# Patient Record
Sex: FEMALE | Race: WHITE | NOT HISPANIC OR LATINO | Employment: OTHER | URBAN - METROPOLITAN AREA
[De-identification: names, ages, dates, MRNs, and addresses within clinical notes are randomized per-mention and may not be internally consistent; named-entity substitution may affect disease eponyms.]

---

## 2017-12-07 LAB
LEFT EYE DIABETIC RETINOPATHY: NORMAL
RIGHT EYE DIABETIC RETINOPATHY: NORMAL

## 2018-09-06 LAB
CREAT ?TM UR-SCNC: 132 UMOL/L
EXT MICROALBUMIN URINE RANDOM: 4.1
MICROALBUMIN/CREAT UR: 31.1 MG/G{CREAT}

## 2018-12-17 LAB
LEFT EYE DIABETIC RETINOPATHY: NORMAL
RIGHT EYE DIABETIC RETINOPATHY: NORMAL

## 2019-03-21 RX ORDER — LANSOPRAZOLE 15 MG/1
15 CAPSULE, DELAYED RELEASE ORAL DAILY
COMMUNITY
Start: 2009-07-16 | End: 2021-10-22 | Stop reason: SDUPTHER

## 2019-03-21 RX ORDER — LEVETIRACETAM 750 MG/1
2250 TABLET ORAL 2 TIMES DAILY
COMMUNITY
Start: 2015-02-27 | End: 2020-01-22 | Stop reason: HOSPADM

## 2019-03-21 RX ORDER — ATORVASTATIN CALCIUM 20 MG/1
20 TABLET, FILM COATED ORAL
COMMUNITY
Start: 2019-01-30 | End: 2019-06-27 | Stop reason: SDUPTHER

## 2019-03-21 RX ORDER — FOSINOPRIL SODIUM 10 MG/1
10 TABLET ORAL DAILY
COMMUNITY
Start: 2019-01-30 | End: 2019-06-27 | Stop reason: SDUPTHER

## 2019-03-21 RX ORDER — ESCITALOPRAM OXALATE 20 MG/1
20 TABLET ORAL DAILY
COMMUNITY
Start: 2009-11-05 | End: 2021-10-22 | Stop reason: SDUPTHER

## 2019-03-21 RX ORDER — TRAZODONE HYDROCHLORIDE 100 MG/1
100 TABLET ORAL
COMMUNITY
Start: 2008-02-11 | End: 2021-10-22 | Stop reason: SDUPTHER

## 2019-03-21 RX ORDER — METFORMIN HYDROCHLORIDE 500 MG/1
500 TABLET, EXTENDED RELEASE ORAL DAILY
COMMUNITY
Start: 2019-01-29 | End: 2019-12-23 | Stop reason: SDUPTHER

## 2019-03-21 RX ORDER — VENLAFAXINE HYDROCHLORIDE 75 MG/1
75 CAPSULE, EXTENDED RELEASE ORAL DAILY
COMMUNITY
Start: 2014-11-13 | End: 2021-10-22 | Stop reason: SDUPTHER

## 2019-03-21 RX ORDER — TOPIRAMATE 100 MG/1
100 TABLET, FILM COATED ORAL 2 TIMES DAILY
COMMUNITY
Start: 2014-05-09 | End: 2020-01-22 | Stop reason: HOSPADM

## 2019-03-25 PROBLEM — M54.30 SCIATICA: Status: ACTIVE | Noted: 2019-03-25

## 2019-03-25 PROBLEM — E78.5 HYPERLIPIDEMIA: Status: ACTIVE | Noted: 2019-03-25

## 2019-03-25 PROBLEM — E11.9 DIABETES MELLITUS (HCC): Status: ACTIVE | Noted: 2019-03-25

## 2019-03-25 PROBLEM — G43.909 MIGRAINE: Status: ACTIVE | Noted: 2019-03-25

## 2019-03-25 PROBLEM — K21.9 ESOPHAGEAL REFLUX: Status: ACTIVE | Noted: 2019-03-25

## 2019-03-25 PROBLEM — M35.00 SJOGREN'S SYNDROME (HCC): Status: ACTIVE | Noted: 2019-03-25

## 2019-03-25 PROBLEM — I10 HYPERTENSION: Status: ACTIVE | Noted: 2019-03-25

## 2019-03-26 ENCOUNTER — OFFICE VISIT (OUTPATIENT)
Dept: FAMILY MEDICINE CLINIC | Facility: CLINIC | Age: 69
End: 2019-03-26
Payer: MEDICARE

## 2019-03-26 VITALS
HEIGHT: 64 IN | BODY MASS INDEX: 31.24 KG/M2 | DIASTOLIC BLOOD PRESSURE: 76 MMHG | HEART RATE: 71 BPM | WEIGHT: 183 LBS | OXYGEN SATURATION: 98 % | SYSTOLIC BLOOD PRESSURE: 116 MMHG

## 2019-03-26 DIAGNOSIS — R56.9 CONVULSIONS, UNSPECIFIED CONVULSION TYPE (HCC): ICD-10-CM

## 2019-03-26 DIAGNOSIS — E78.49 OTHER HYPERLIPIDEMIA: ICD-10-CM

## 2019-03-26 DIAGNOSIS — D43.2 GANGLIOGLIOMA OF BRAIN (HCC): ICD-10-CM

## 2019-03-26 DIAGNOSIS — E66.9 OBESITY (BMI 30.0-34.9): ICD-10-CM

## 2019-03-26 DIAGNOSIS — E11.65 TYPE 2 DIABETES MELLITUS WITH HYPERGLYCEMIA, WITHOUT LONG-TERM CURRENT USE OF INSULIN (HCC): Primary | ICD-10-CM

## 2019-03-26 DIAGNOSIS — M35.00 SICCA SYNDROME (HCC): ICD-10-CM

## 2019-03-26 DIAGNOSIS — Z12.39 BREAST CANCER SCREENING: ICD-10-CM

## 2019-03-26 PROBLEM — G40.909 SEIZURE DISORDER (HCC): Status: ACTIVE | Noted: 2019-03-26

## 2019-03-26 PROBLEM — F32.A DEPRESSION: Status: ACTIVE | Noted: 2019-03-26

## 2019-03-26 LAB — SL AMB POCT HEMOGLOBIN AIC: 6 (ref ?–6.5)

## 2019-03-26 PROCEDURE — 99203 OFFICE O/P NEW LOW 30 MIN: CPT | Performed by: NURSE PRACTITIONER

## 2019-03-26 PROCEDURE — 83036 HEMOGLOBIN GLYCOSYLATED A1C: CPT | Performed by: NURSE PRACTITIONER

## 2019-03-26 RX ORDER — MULTIVIT-MIN/IRON FUM/FOLIC AC 7.5 MG-4
1 TABLET ORAL DAILY
COMMUNITY

## 2019-03-26 NOTE — PROGRESS NOTES
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[facility] )    Other orders  -     ASPIRIN 81 PO; Take 81 mg by mouth daily  -     atorvastatin (LIPITOR) 20 mg tablet; Take 20 mg by mouth daily at bedtime  -     estrogen, conjugated,-medroxyprogesterone (PREMPRO) 0 625-5 MG per tablet; Take 1 tablet by mouth daily  -     diclofenac sodium (VOLTAREN) 1 %; Apply 1 application topically Three times daily as needed  -     escitalopram (LEXAPRO) 20 mg tablet; Take 20 mg by mouth daily  -     fosinopril (MONOPRIL) 10 mg tablet; Take 10 mg by mouth daily  -     lansoprazole (PREVACID) 15 mg capsule; Take 15 mg by mouth daily  -     levETIRAcetam (KEPPRA) 750 mg tablet; Take 2,250 mg by mouth 2 (two) times a day  -     metFORMIN (GLUCOPHAGE-XR) 500 mg 24 hr tablet; Take 500 mg by mouth daily  -     topiramate (TOPAMAX) 100 mg tablet; Take 100 mg by mouth 2 (two) times a day  -     traZODone (DESYREL) 100 mg tablet; Take 100 mg by mouth daily at bedtime  -     venlafaxine (EFFEXOR-XR) 75 mg 24 hr capsule; Take 75 mg by mouth daily  -     topiramate (TOPAMAX) 200 MG tablet; Take 200 mg by mouth 2 (two) times a day  -     Multiple Vitamins-Minerals (MULTIVITAMIN WITH MINERALS) tablet; Take 1 tablet by mouth daily     MDM:  POCT HA1C 6 0 today - adequate range with current metformin use  Blood work from 03/15/2019 indicate adequate cholesterol level, Triglycerides slightly elevated at 201  BP within range today  Kidney function also adequate  Subjective:      Patient ID: Glenny Brenner is a 79 y o  female      Here today as a new patient accompanied by her   She is with a hx of T2DM, HLD, HTN, seizures, post brain tumor removal (04/2018), and depression  She is currently followed by neurology for her seizures - is currently being decreased on her Keppra and next Topamax  Repeat MRI for re-eval due to brain tumor resection every 6 months  Has not had any seizures since brain tumor removal     Has been on Metformin for the past two years  Has been taking a Statin for quite some time with no side effects  Currently on Lexapro and Effexor for depression - has been taking them for years  Has a hx of GERD - currently controlled by Prevacid  She does watch her carb intake with her diabetes  Has no formal exercise program, but does try to walk around the yard and is active with her grandchildren  The following portions of the patient's history were reviewed and updated as appropriate: allergies, current medications, past family history, past medical history, past social history, past surgical history and problem list     Review of Systems   Constitutional: Negative  HENT: Negative  Respiratory: Negative  Cardiovascular: Negative  Neurological: Negative  Objective:      /76 (BP Location: Right arm, Patient Position: Sitting, Cuff Size: Standard)   Pulse 71   Ht 5' 4" (1 626 m)   Wt 83 kg (183 lb)   SpO2 98%   BMI 31 41 kg/m²          Physical Exam   Constitutional: She is oriented to person, place, and time  She appears well-developed and well-nourished  HENT:   Head: Normocephalic and atraumatic  Neck: Neck supple  Cardiovascular: Normal rate, regular rhythm and normal heart sounds  Exam reveals no gallop and no friction rub  Pulses are no weak pulses  No murmur heard  Pulses:       Dorsalis pedis pulses are 2+ on the right side, and 2+ on the left side  Pulmonary/Chest: Effort normal and breath sounds normal  No stridor  No respiratory distress  She has no wheezes   She has no rales  Feet:   Right Foot:   Skin Integrity: Negative for ulcer, skin breakdown, erythema, warmth, callus or dry skin  Left Foot:   Skin Integrity: Negative for ulcer, skin breakdown, erythema, warmth, callus or dry skin  Neurological: She is alert and oriented to person, place, and time  Psychiatric: She has a normal mood and affect  Her behavior is normal  Judgment and thought content normal    Vitals reviewed  I have spent 30 minutes with Patient and family today in which greater than 50% of this time was spent in counseling/coordination of care regarding Impressions and education  Patient's shoes and socks removed  Right Foot/Ankle   Right Foot Inspection  Skin Exam: skin normal and skin intact no dry skin, no warmth, no callus, no erythema, no maceration, no abnormal color, no pre-ulcer, no ulcer and no callus                            Sensory       Monofilament testing: intact  Vascular    The right DP pulse is 2+  Left Foot/Ankle  Left Foot Inspection  Skin Exam: skin normal and skin intactno dry skin, no warmth, no erythema, no maceration, normal color, no pre-ulcer, no ulcer and no callus                                         Sensory       Monofilament: intact  Vascular    The left DP pulse is 2+  Assign Risk Category:  No deformity present; No loss of protective sensation; No weak pulses       Risk: 0        BMI Counseling: Body mass index is 31 41 kg/m²  Discussed the patient's BMI with her  The BMI is above average  BMI counseling and education was provided to the patient  Exercise recommendations include moderate aerobic physical activity for 150 minutes/week

## 2019-04-29 ENCOUNTER — HOSPITAL ENCOUNTER (OUTPATIENT)
Dept: MAMMOGRAPHY | Facility: MEDICAL CENTER | Age: 69
Discharge: HOME | End: 2019-04-29
Payer: MEDICARE

## 2019-04-29 VITALS — BODY MASS INDEX: 31.24 KG/M2 | HEIGHT: 64 IN | WEIGHT: 183 LBS

## 2019-04-29 DIAGNOSIS — Z12.39 BREAST CANCER SCREENING: ICD-10-CM

## 2019-04-29 PROCEDURE — 77067 SCR MAMMO BI INCL CAD: CPT

## 2019-04-29 PROCEDURE — 77063 BREAST TOMOSYNTHESIS BI: CPT

## 2019-05-07 ENCOUNTER — HOSPITAL ENCOUNTER (OUTPATIENT)
Dept: ULTRASOUND IMAGING | Facility: CLINIC | Age: 69
Discharge: HOME | End: 2019-05-07
Payer: MEDICARE

## 2019-05-07 ENCOUNTER — HOSPITAL ENCOUNTER (OUTPATIENT)
Dept: MAMMOGRAPHY | Facility: CLINIC | Age: 69
Discharge: HOME | End: 2019-05-07
Payer: MEDICARE

## 2019-05-07 VITALS — HEIGHT: 64 IN | BODY MASS INDEX: 31.24 KG/M2 | WEIGHT: 183 LBS

## 2019-05-07 DIAGNOSIS — R92.8 ABNORMAL MAMMOGRAM: ICD-10-CM

## 2019-05-07 PROCEDURE — 77065 DX MAMMO INCL CAD UNI: CPT

## 2019-05-07 PROCEDURE — G0279 TOMOSYNTHESIS, MAMMO: HCPCS

## 2019-05-07 PROCEDURE — 76642 ULTRASOUND BREAST LIMITED: CPT

## 2019-05-08 ENCOUNTER — TRANSCRIBE ORDERS (OUTPATIENT)
Dept: ADMINISTRATIVE | Facility: HOSPITAL | Age: 69
End: 2019-05-08

## 2019-05-08 DIAGNOSIS — Z09 FOLLOW-UP EXAM, 3-6 MONTHS SINCE PREVIOUS EXAM: ICD-10-CM

## 2019-05-08 DIAGNOSIS — R92.8 ABNORMAL MAMMOGRAM: ICD-10-CM

## 2019-05-09 ENCOUNTER — OFFICE VISIT (OUTPATIENT)
Dept: FAMILY MEDICINE CLINIC | Facility: CLINIC | Age: 69
End: 2019-05-09
Payer: MEDICARE

## 2019-05-09 ENCOUNTER — APPOINTMENT (OUTPATIENT)
Dept: RADIOLOGY | Facility: CLINIC | Age: 69
End: 2019-05-09
Payer: MEDICARE

## 2019-05-09 VITALS
BODY MASS INDEX: 31.45 KG/M2 | HEIGHT: 64 IN | WEIGHT: 184.2 LBS | HEART RATE: 74 BPM | SYSTOLIC BLOOD PRESSURE: 116 MMHG | OXYGEN SATURATION: 98 % | DIASTOLIC BLOOD PRESSURE: 70 MMHG
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
## 2019-05-09 DIAGNOSIS — R21 RASH: Primary | ICD-10-CM

## 2019-05-09 DIAGNOSIS — R07.81 RIB PAIN ON RIGHT SIDE: ICD-10-CM

## 2019-05-09 DIAGNOSIS — L30.9 DERMATITIS: ICD-10-CM

## 2019-05-09 PROCEDURE — 99213 OFFICE O/P EST LOW 20 MIN: CPT | Performed by: NURSE PRACTITIONER

## 2019-05-09 PROCEDURE — 71101 X-RAY EXAM UNILAT RIBS/CHEST: CPT

## 2019-05-09 RX ORDER — MOMETASONE FUROATE 1 MG/G
CREAM TOPICAL DAILY
Qty: 45 G | Refills: 0 | Status: SHIPPED | OUTPATIENT
Start: 2019-05-09 | End: 2020-01-14

## 2019-06-27 ENCOUNTER — OFFICE VISIT (OUTPATIENT)
Dept: FAMILY MEDICINE CLINIC | Facility: CLINIC | Age: 69
End: 2019-06-27
Payer: MEDICARE

## 2019-06-27 VITALS
DIASTOLIC BLOOD PRESSURE: 68 MMHG | OXYGEN SATURATION: 98 % | BODY MASS INDEX: 31.04 KG/M2 | HEART RATE: 71 BPM | WEIGHT: 181.8 LBS | SYSTOLIC BLOOD PRESSURE: 116 MMHG | HEIGHT: 64 IN

## 2019-06-27 DIAGNOSIS — E78.49 OTHER HYPERLIPIDEMIA: ICD-10-CM

## 2019-06-27 DIAGNOSIS — I10 ESSENTIAL HYPERTENSION: ICD-10-CM

## 2019-06-27 DIAGNOSIS — Z00.00 MEDICARE ANNUAL WELLNESS VISIT, SUBSEQUENT: Primary | ICD-10-CM

## 2019-06-27 PROCEDURE — G0439 PPPS, SUBSEQ VISIT: HCPCS | Performed by: NURSE PRACTITIONER

## 2019-06-27 RX ORDER — FOSINOPRIL SODIUM 10 MG/1
10 TABLET ORAL DAILY
Qty: 90 TABLET | Refills: 1 | Status: SHIPPED | OUTPATIENT
Start: 2019-06-27 | End: 2019-11-13 | Stop reason: SDUPTHER

## 2019-06-27 RX ORDER — ATORVASTATIN CALCIUM 20 MG/1
20 TABLET, FILM COATED ORAL
Qty: 90 TABLET | Refills: 1 | Status: SHIPPED | OUTPATIENT
Start: 2019-06-27 | End: 2019-11-13 | Stop reason: SDUPTHER

## 2019-10-04 ENCOUNTER — OFFICE VISIT (OUTPATIENT)
Dept: FAMILY MEDICINE CLINIC | Facility: CLINIC | Age: 69
End: 2019-10-04
Payer: MEDICARE

## 2019-10-04 VITALS
HEART RATE: 73 BPM | HEIGHT: 64 IN | SYSTOLIC BLOOD PRESSURE: 112 MMHG | WEIGHT: 176.37 LBS | DIASTOLIC BLOOD PRESSURE: 76 MMHG | BODY MASS INDEX: 30.11 KG/M2 | OXYGEN SATURATION: 97 %

## 2019-10-04 DIAGNOSIS — E78.49 OTHER HYPERLIPIDEMIA: ICD-10-CM

## 2019-10-04 DIAGNOSIS — Z11.59 ENCOUNTER FOR HEPATITIS C SCREENING TEST FOR LOW RISK PATIENT: Primary | ICD-10-CM

## 2019-10-04 DIAGNOSIS — K21.9 GASTROESOPHAGEAL REFLUX DISEASE WITHOUT ESOPHAGITIS: ICD-10-CM

## 2019-10-04 DIAGNOSIS — Z78.0 MENOPAUSE: ICD-10-CM

## 2019-10-04 DIAGNOSIS — E11.9 TYPE 2 DIABETES MELLITUS WITHOUT COMPLICATION, WITHOUT LONG-TERM CURRENT USE OF INSULIN (HCC): ICD-10-CM

## 2019-10-04 DIAGNOSIS — G43.909 MIGRAINE WITHOUT STATUS MIGRAINOSUS, NOT INTRACTABLE, UNSPECIFIED MIGRAINE TYPE: ICD-10-CM

## 2019-10-04 DIAGNOSIS — Z23 NEEDS FLU SHOT: ICD-10-CM

## 2019-10-04 DIAGNOSIS — I10 ESSENTIAL HYPERTENSION: ICD-10-CM

## 2019-10-04 DIAGNOSIS — G40.909 SEIZURE DISORDER (HCC): ICD-10-CM

## 2019-10-04 DIAGNOSIS — Z12.11 ENCOUNTER FOR SCREENING FECAL OCCULT BLOOD TESTING: ICD-10-CM

## 2019-10-04 DIAGNOSIS — Z12.39 BREAST CANCER SCREENING: ICD-10-CM

## 2019-10-04 DIAGNOSIS — F33.0 MILD EPISODE OF RECURRENT MAJOR DEPRESSIVE DISORDER (HCC): ICD-10-CM

## 2019-10-04 LAB
CREAT UR-MCNC: 149 MG/DL
MICROALBUMIN UR-MCNC: 20 MG/L (ref 0–20)
MICROALBUMIN/CREAT 24H UR: 13 MG/G CREATININE (ref 0–30)
SL AMB POCT HEMOGLOBIN AIC: 6.1 (ref ?–6.5)

## 2019-10-04 PROCEDURE — 99213 OFFICE O/P EST LOW 20 MIN: CPT | Performed by: NURSE PRACTITIONER

## 2019-10-04 PROCEDURE — 82043 UR ALBUMIN QUANTITATIVE: CPT | Performed by: NURSE PRACTITIONER

## 2019-10-04 PROCEDURE — 90662 IIV NO PRSV INCREASED AG IM: CPT | Performed by: NURSE PRACTITIONER

## 2019-10-04 PROCEDURE — 82570 ASSAY OF URINE CREATININE: CPT | Performed by: NURSE PRACTITIONER

## 2019-10-04 PROCEDURE — 83036 HEMOGLOBIN GLYCOSYLATED A1C: CPT | Performed by: NURSE PRACTITIONER

## 2019-10-04 PROCEDURE — G0008 ADMIN INFLUENZA VIRUS VAC: HCPCS | Performed by: NURSE PRACTITIONER

## 2019-10-04 NOTE — PROGRESS NOTES
Assessment/Plan:       Diagnoses and all orders for this visit:    Encounter for hepatitis C screening test for low risk patient  -     Hepatitis C antibody; Future    Needs flu shot  -     influenza vaccine, [id number], high-dose, PF 0 5 mL (FLUZONE HIGH-DOSE)    Breast cancer screening    Menopause  -     Hepatitis C antibody; Future  -     estrogen, conjugated,-medroxyprogesterone (PREMPRO) 0 625-5 MG per tablet; Take 1 tablet by mouth daily    Type 2 diabetes mellitus without complication, without long-term current use of insulin (HCC)  -     POCT hemoglobin A1c  -     Microalbumin / creatinine urine ratio  -     Ambulatory referral to Ophthalmology; Future    Essential hypertension    Migraine without status migrainosus, not intractable, unspecified migraine type  -     Comprehensive metabolic panel; Future    Gastroesophageal reflux disease without esophagitis    Seizure disorder ([facility])    Other hyperlipidemia  -     Lipid panel; Future    Mild episode of recurrent major depressive disorder ([facility] )    Encounter for screening fecal occult blood testing  -     Occult Blood, Fecal Immunochemical; Future    Other orders  -     Cancel: Mammo screening bilateral w 3d & cad; Future      Screening blood work to be drawn before next visit  HA1C of 6 1   Slightly higher than 6 months ago but still within range  Fit Kit provided for colon CA screening  Prempro refill provided today  BP within range  Flu shot provided  Subjective:      Patient ID: Anny Ramirez is a 81 y o  female  Here today for a follow-up  She is with a hx of chronic issues: T2DM, HLD, HTN, GERD, migraine's, depression, and seizures  She is on oral medications for her chronic conditions with good control  Her POCT HA1C today was 6 1  She continues with her metformin  She remains seizure free, and has minimal migraine's  Hx of bran tumor, currently alert and oriented  Mammogram scheduled for 11/2019   Denies any issues, open areas with her feet        The following portions of the patient's history were reviewed and updated as appropriate: allergies, current medications, past family history, past medical history, past social history, past surgical history and problem list     Review of Systems   Constitutional: Negative  Respiratory: Negative  Cardiovascular: Negative  Neurological: Negative  Objective:      /76 (BP Location: Left arm, Patient Position: Sitting, Cuff Size: Standard)   Pulse 73   Ht 5' 4" (1 626 m)   Wt 80 kg (176 lb 5 9 oz)   SpO2 97%   BMI 30 27 kg/m²          Physical Exam   Constitutional: She is oriented to person, place, and time  She appears well-developed and well-nourished  HENT:   Head: Normocephalic and atraumatic  Cardiovascular: Normal rate, regular rhythm and normal heart sounds  Pulmonary/Chest: Effort normal and breath sounds normal  No respiratory distress  Neurological: She is alert and oriented to person, place, and time  Psychiatric: She has a normal mood and affect  Her behavior is normal  Judgment and thought content normal    Vitals reviewed  Patient's shoes and socks were not removed  Assign Risk Category:  No deformity present;  No loss of protective sensation;        Risk: 0

## 2019-10-08 ENCOUNTER — APPOINTMENT (OUTPATIENT)
Dept: LAB | Facility: HOSPITAL | Age: 69
End: 2019-10-08
Payer: MEDICARE

## 2019-10-08 DIAGNOSIS — Z12.11 ENCOUNTER FOR SCREENING FECAL OCCULT BLOOD TESTING: ICD-10-CM

## 2019-10-08 LAB — HEMOCCULT STL QL IA: NEGATIVE

## 2019-10-08 PROCEDURE — G0328 FECAL BLOOD SCRN IMMUNOASSAY: HCPCS

## 2019-10-18 ENCOUNTER — TELEPHONE (OUTPATIENT)
Dept: FAMILY MEDICINE CLINIC | Facility: CLINIC | Age: 69
End: 2019-10-18

## 2019-10-18 NOTE — TELEPHONE ENCOUNTER
called  [employer] mail order called patient to advise the Prempro is not covered and they are trying to reach you for a substitute medication  Per patient Jerilyn Boeck is unable to reach our office  Advised  we will look into this

## 2019-10-18 NOTE — TELEPHONE ENCOUNTER
Spoke to   He is requesting we hold on ordering any of the above  States she has enough to get through the end of the year and he is now looking into possibly switching insurance companies

## 2019-10-18 NOTE — TELEPHONE ENCOUNTER
Ok, I checked other estrogen medications and per our formulary in [address] [place] [address] Rd with their insurance, only the Premarin is covered which is Estrogen only , not a combination of the estrogen/progestin like the Prempro

## 2019-10-24 DIAGNOSIS — Z78.0 MENOPAUSE: ICD-10-CM

## 2019-10-24 NOTE — TELEPHONE ENCOUNTER
Patient's  is going come by for the script  Will you please print out the script for Prempro  He is going to pay out of pocket

## 2019-11-12 ENCOUNTER — HOSPITAL ENCOUNTER (OUTPATIENT)
Dept: MAMMOGRAPHY | Facility: CLINIC | Age: 69
Discharge: HOME | End: 2019-11-12
Payer: MEDICARE

## 2019-11-12 VITALS — HEIGHT: 64 IN | BODY MASS INDEX: 30.05 KG/M2 | WEIGHT: 176 LBS

## 2019-11-12 DIAGNOSIS — Z09 FOLLOW-UP EXAM, 3-6 MONTHS SINCE PREVIOUS EXAM: ICD-10-CM

## 2019-11-12 DIAGNOSIS — R92.8 ABNORMAL MAMMOGRAM: ICD-10-CM

## 2019-11-12 PROCEDURE — G0279 TOMOSYNTHESIS, MAMMO: HCPCS

## 2019-11-12 PROCEDURE — 77065 DX MAMMO INCL CAD UNI: CPT

## 2019-11-13 DIAGNOSIS — E78.49 OTHER HYPERLIPIDEMIA: ICD-10-CM

## 2019-11-13 DIAGNOSIS — I10 ESSENTIAL HYPERTENSION: ICD-10-CM

## 2019-11-14 RX ORDER — ATORVASTATIN CALCIUM 20 MG/1
TABLET, FILM COATED ORAL
Qty: 90 TABLET | Refills: 0 | Status: SHIPPED | OUTPATIENT
Start: 2019-11-14 | End: 2019-12-14 | Stop reason: SDUPTHER

## 2019-11-14 RX ORDER — FOSINOPRIL SODIUM 10 MG/1
TABLET ORAL
Qty: 90 TABLET | Refills: 0 | Status: SHIPPED | OUTPATIENT
Start: 2019-11-14 | End: 2019-12-14 | Stop reason: SDUPTHER

## 2019-12-14 DIAGNOSIS — E78.49 OTHER HYPERLIPIDEMIA: ICD-10-CM

## 2019-12-14 DIAGNOSIS — I10 ESSENTIAL HYPERTENSION: ICD-10-CM

## 2019-12-16 ENCOUNTER — OFFICE VISIT (OUTPATIENT)
Dept: FAMILY MEDICINE CLINIC | Facility: CLINIC | Age: 69
End: 2019-12-16
Payer: MEDICARE

## 2019-12-16 ENCOUNTER — TRANSCRIBE ORDERS (OUTPATIENT)
Dept: ADMINISTRATIVE | Facility: HOSPITAL | Age: 69
End: 2019-12-16

## 2019-12-16 ENCOUNTER — APPOINTMENT (OUTPATIENT)
Dept: RADIOLOGY | Facility: CLINIC | Age: 69
End: 2019-12-16
Payer: MEDICARE

## 2019-12-16 VITALS
DIASTOLIC BLOOD PRESSURE: 66 MMHG | HEART RATE: 70 BPM | SYSTOLIC BLOOD PRESSURE: 112 MMHG | OXYGEN SATURATION: 99 % | BODY MASS INDEX: 30.49 KG/M2 | HEIGHT: 64 IN | WEIGHT: 178.57 LBS

## 2019-12-16 DIAGNOSIS — R05.9 COUGH: Primary | ICD-10-CM

## 2019-12-16 DIAGNOSIS — M79.622 PAIN IN LEFT UPPER ARM: ICD-10-CM

## 2019-12-16 DIAGNOSIS — I10 ESSENTIAL HYPERTENSION: ICD-10-CM

## 2019-12-16 PROCEDURE — 99213 OFFICE O/P EST LOW 20 MIN: CPT | Performed by: NURSE PRACTITIONER

## 2019-12-16 PROCEDURE — 73060 X-RAY EXAM OF HUMERUS: CPT

## 2019-12-16 RX ORDER — ATORVASTATIN CALCIUM 20 MG/1
TABLET, FILM COATED ORAL
Qty: 90 TABLET | Refills: 0 | Status: SHIPPED | OUTPATIENT
Start: 2019-12-16 | End: 2020-07-15 | Stop reason: SDUPTHER

## 2019-12-16 RX ORDER — FOSINOPRIL SODIUM 10 MG/1
TABLET ORAL
Qty: 90 TABLET | Refills: 0 | Status: SHIPPED | OUTPATIENT
Start: 2019-12-16 | End: 2019-12-16

## 2019-12-16 RX ORDER — AMLODIPINE BESYLATE 2.5 MG/1
5 TABLET ORAL DAILY
Qty: 180 TABLET | Refills: 0 | Status: SHIPPED | OUTPATIENT
Start: 2019-12-16 | End: 2020-03-05 | Stop reason: SDUPTHER

## 2019-12-16 NOTE — PROGRESS NOTES
Assessment/Plan:       Diagnoses and all orders for this visit:    Cough  -     amLODIPine (NORVASC) 2 5 mg tablet; Take 2 tablets (5 mg total) by mouth daily    Essential hypertension  -     amLODIPine (NORVASC) 2 5 mg tablet; Take 2 tablets (5 mg total) by mouth daily    Pain in left upper arm  -     amLODIPine (NORVASC) 2 5 mg tablet; Take 2 tablets (5 mg total) by mouth daily  -     XR humerus left; Future      Suspect bone/muscle bruise to left upper arm in regards to her recent arm injury  Will have the arm xray'd first - explained to her the potential treatment options - ortho vs PT  Per the cough, as she was told in the past it was due to her ace inhibitor and she has remained on it, will d/c that and will have her start Amlodipine  Will have her return in 4 weeks for a re-evaluation of the BP and the cough  If the cough persists will have to explore a pulmonary/GI route for the source of the cough  Subjective:      Patient ID: Sophie Suarez is a 81 y o  female  Here today for a same day visit accompanied by her  with complaint of left upper arm pain and an ongoing cough  Reports she hit the refrigerator door into her left upper arm a few weeks ago causing pain   Notes of pain to the left outer arm between the Deltoid and triceps/biceps muscle when abducting her arm up to a 90 degree angle  Denies any bruising at time of injury, no bruising present now  Also with an ongoing dry cough that has been going on for several years but has worsened more recently - reports she was told by her last PCP that it was due to her BP medication - she was kept on the BP medication by them   She reports at times when she coughs she feels as though "there is something in my throat "        The following portions of the patient's history were reviewed and updated as appropriate: allergies, current medications, past family history, past medical history, past social history, past surgical history and problem list     Review of Systems   Constitutional: Negative  Respiratory: Positive for cough  Musculoskeletal:        Please see HPI  All other systems reviewed and are negative  Objective:      /66 (BP Location: Left arm, Patient Position: Sitting, Cuff Size: Standard)   Pulse 70   Ht 5' 4" (1 626 m)   Wt 81 kg (178 lb 9 2 oz)   SpO2 99%   BMI 30 65 kg/m²          Physical Exam   Constitutional: She is oriented to person, place, and time  She appears well-developed and well-nourished  HENT:   Head: Normocephalic and atraumatic  Cardiovascular: Normal rate, regular rhythm and normal heart sounds  Exam reveals no friction rub  No murmur heard  Pulses:       Radial pulses are 2+ on the left side  Pulmonary/Chest: Effort normal and breath sounds normal  No stridor  No respiratory distress  She has no wheezes  Musculoskeletal:        Left upper arm: She exhibits tenderness  She exhibits no swelling, no edema, no deformity and no laceration  Arms:  Left arm strength equal and strong when compared to right arm strength  Neurological: She is alert and oriented to person, place, and time

## 2019-12-16 NOTE — PATIENT INSTRUCTIONS
You may receive a survey in the mail, or by e-mail, please fill it out, and let us know how we did! Please remember to sign up for your [employer] debby to check you lab results, send us messages, and schedule appointments  If you have questions about the [employer] option, please call us! Thank you again for choosing [facility]!

## 2019-12-19 LAB
LEFT EYE DIABETIC RETINOPATHY: NORMAL
RIGHT EYE DIABETIC RETINOPATHY: NORMAL

## 2019-12-23 DIAGNOSIS — M79.622 PAIN IN LEFT UPPER ARM: Primary | ICD-10-CM

## 2019-12-23 DIAGNOSIS — E11.9 TYPE 2 DIABETES MELLITUS WITHOUT COMPLICATION, WITHOUT LONG-TERM CURRENT USE OF INSULIN (HCC): Primary | ICD-10-CM

## 2019-12-23 RX ORDER — METFORMIN HYDROCHLORIDE 500 MG/1
500 TABLET, EXTENDED RELEASE ORAL
Qty: 90 TABLET | Refills: 1 | Status: SHIPPED | OUTPATIENT
Start: 2019-12-23 | End: 2020-05-18 | Stop reason: SDUPTHER

## 2019-12-26 ENCOUNTER — EVALUATION (OUTPATIENT)
Dept: PHYSICAL THERAPY | Facility: CLINIC | Age: 69
End: 2019-12-26
Payer: MEDICARE

## 2019-12-26 DIAGNOSIS — M79.622 PAIN IN LEFT UPPER ARM: Primary | ICD-10-CM

## 2019-12-26 PROCEDURE — 97162 PT EVAL MOD COMPLEX 30 MIN: CPT | Performed by: PHYSICAL THERAPIST

## 2019-12-26 PROCEDURE — 97110 THERAPEUTIC EXERCISES: CPT | Performed by: PHYSICAL THERAPIST

## 2019-12-26 NOTE — PROGRESS NOTES
PT Evaluation     Today's date: 2019  Patient name: Susan Marte  : 1950  MRN: [id number]  Referring provider: Noam Glasgow, *  Dx:   Encounter Diagnosis     ICD-10-CM    1  Pain in left upper arm M79 622 Ambulatory referral to Physical Therapy                  Assessment  Assessment details: Susan Marte is a pleasant 81 y o  female presenting to PT with cc of L upper arm and neck stiffness for past 4 weeks  Pt  States pain began when hitting fridge shelf into upper lateral arm  Upon examination, patient was found to have objective deficits as listed below and is displaying capsular pattern of restriction consistent with frozen shoulder  Pt  Is experiencing subsequent functional deficits including inability to grab item overhead, difficulty washing hair and dressing  Pt  Was educated on role of PT to address issues and given initial HEP  Pt  Would benefit from skilled physical therapy to promote improved function and maximize activity tolerance     Understanding of Dx/Px/POC: good   Prognosis: good    Goals      Short Term Goals:  - Decrease pain by 50% in 3 weeks  - Increase L ER  AROM by 50% in 4 weeks  - Increase L Flexion AROM by 50% in 4 weeks    Long Term Goals:  - Independent with comprehensive home exercise program at discharge  - Increase Functional Status Measure to: 80  - Increase strength equal to contralateral side at discharge  - Increase ROM to [facility] at discharge    Plan  Planned modality interventions: cryotherapy, high voltage pulsed current: pain management, high voltage pulsed current: spasm management and unattended electrical stimulation  Planned therapy interventions: abdominal trunk stabilization, body mechanics training, neuromuscular re-education, motor coordination training, joint mobilization, manual therapy, patient education, postural training, therapeutic exercise, therapeutic activities, therapeutic training, strengthening and stretching  Frequency: 3x week  Duration in weeks: 6  Plan of Care beginning date: 2019  Plan of Care expiration date: 2020  Treatment plan discussed with: patient and family        Subjective Evaluation    History of Present Illness  Mechanism of injury: Pt  Presents to PT with cc of acute L upper arm pain with shoulder and neck stiffness for past 4 weeks  She notes pulling open refrigerator door with L arm and forcefully struck shelf off of lateral upper arm  She notes paresthesias following and has had worsening stiffness and pain ever since  She hopes pain would reduce on its own bud hadnt so she visited [facility]  She does have hx of RA and DMII  Denies any past issues in Shoulder/arm  Pain  Current pain rating: 3  At best pain ratin  At worst pain ratin  Location: Lateral upper L arm, superior shoulder, neck  Quality: tight, discomfort and pulling  Relieving factors: relaxation, rest and heat  Aggravating factors: lifting and overhead activity  Progression: worsening    Social Support  Lives with: spouse    Employment status: not working  Hand dominance: right      Diagnostic Tests  X-ray: normal  Patient Goals  Patient goals for therapy: decreased pain, increased motion, return to sport/leisure activities, independence with ADLs/IADLs and increased strength          Objective     Postural Observations  Seated posture: fair  Standing posture: fair    Additional Postural Observation Details  Forward head, rounded shoulders, protracted shoulders, guarded L arm    Palpation     Additional Palpation Details  Pt  TTP along lateral L upper arm  Particularly at lateral, superior biceps   TTP at upper trap, lev scap    Cervical/Thoracic Screen   Cervical range of motion within normal limits with the following exceptions: Reduced B Side bending 50%  Thoracic range of motion within normal limits with the following exceptions: Extension reduced 25%    Neurological Testing     Sensation     Shoulder   Left Shoulder Intact: light touch    Right Shoulder   Intact: light touch    Reflexes   Left   Biceps (C5/C6): trace (1+)    Right   Biceps (C5/C6): trace (1+)    Active Range of Motion   Left Shoulder   Flexion: 125 degrees   Extension: 32 degrees   Abduction: 92 degrees   External rotation 0°: 20 degrees   External rotation 45°: 25 degrees   Internal rotation 0°: 80 degrees     Right Shoulder   Normal active range of motion    Passive Range of Motion   Left Shoulder   Flexion: 130 degrees   Abduction: 115 degrees   External rotation 0°: 22 degrees   External rotation 45°: 26 degrees     Joint Play   Left Shoulder  Hypomobile in the posterior capsule and inferior capsule  Strength/Myotome Testing     Additional Strength Details  L Shoulder Grossly 4+/5 in available range    Tests     Left Shoulder   Negative apprehension and Yergason's                Precautions: DMII      Manual  12/26            PROM -            GH Miles Gr (2-3) -                                                       Exercise Diary  12/26            Pulleys 3'            Table Slides Flexion 10x10"            Pendulums -            Wall Ladder -            Supine Wand Flexions 20x            Supine Wand ER 20x            Doorway Stretch -            Bent over rows -                                                                                                                                                                            Modalities  12/26            MHP pre PT -            CP post PT prn -

## 2019-12-30 ENCOUNTER — OFFICE VISIT (OUTPATIENT)
Dept: PHYSICAL THERAPY | Facility: CLINIC | Age: 69
End: 2019-12-30
Payer: MEDICARE

## 2019-12-30 DIAGNOSIS — M79.622 PAIN IN LEFT UPPER ARM: Primary | ICD-10-CM

## 2019-12-30 PROCEDURE — 97110 THERAPEUTIC EXERCISES: CPT

## 2019-12-30 PROCEDURE — 97140 MANUAL THERAPY 1/> REGIONS: CPT

## 2019-12-30 NOTE — PROGRESS NOTES
Daily Note     Today's date: 2019  Patient name: Sonya Nixon  : 1950  MRN: [id number]  Referring provider: Deepika Sales, *  Dx:   Encounter Diagnosis     ICD-10-CM    1  Pain in left upper arm M79 622                   Subjective: Patient reports shoulder feeling a little looser and less painful  Objective: See treatment diary below      Assessment: Sonya Nixon appears to have made some progress towards goals  Patient pain appears to be greatest during ER stretching  She is most limited ER  she required moderate verbal cueing to complete exercises appropriate form and intensity  Patient ROM should improve with continued treatments  Plan: Continue per plan of care        Precautions: DMII      Manual             PROM - SJ           GH Mobz Gr (2-3) - LY                                                      Exercise Diary             Pulleys 3' 5'           Table Slides Flexion 10x10" Flexion 10x10"           Pendulums - x30 ea           Wall Ladder - 5" x10           Supine Wand Flexions 20x 20x           Supine Wand ER 20x 20x           Doorway Stretch - nv           Bent over rows - 2x10                                                                                                                                                                           Modalities             MHP pre PT -            CP post PT prn - def

## 2020-01-02 ENCOUNTER — OFFICE VISIT (OUTPATIENT)
Dept: PHYSICAL THERAPY | Facility: CLINIC | Age: 70
End: 2020-01-02
Payer: MEDICARE

## 2020-01-02 DIAGNOSIS — M79.622 PAIN IN LEFT UPPER ARM: Primary | ICD-10-CM

## 2020-01-02 PROCEDURE — 97110 THERAPEUTIC EXERCISES: CPT

## 2020-01-02 PROCEDURE — 97140 MANUAL THERAPY 1/> REGIONS: CPT

## 2020-01-02 NOTE — PROGRESS NOTES
Daily Note     Today's date: 2020  Patient name: Kiki Woodward  : 1950  MRN: [id number]  Referring provider: Reyes Zayas, *  Dx:   Encounter Diagnosis     ICD-10-CM    1  Pain in left upper arm M79 622                   Subjective: Patient reports shoulder felt sore after last visit but felt good next day  Objective: See treatment diary below      Assessment: Kiki Woodward continues with good  progress towards goals  Patient pain appears to be decreasing but limits remain in ROM particularly in ER  she required moderate verbal cueing to complete exercises appropriate form and intensity  Patient pain should resolve with continued treatments  Plan: Continue per plan of care        Precautions: DMII      Manual            PROM - SJ SJ          GH Mobz Gr (2-3) - LY LY                                                     Exercise Diary            Pulleys 3' 5' 5'          Table Slides Flexion 10x10" Flexion 10x10" 10x10"  ea          Pendulums - x30 ea x30ea          Wall Ladder - 5" x10 5"x10          Supine Wand Flexions 20x 20x 20x          Supine Wand ER 20x 20x 20x          Doorway Stretch - nv Low  30"x4          Bent over rows - 2x10 3x10                                                                                                                                                                          Modalities            MHP pre PT -            CP post PT prn - def

## 2020-01-06 ENCOUNTER — OFFICE VISIT (OUTPATIENT)
Dept: PHYSICAL THERAPY | Facility: CLINIC | Age: 70
End: 2020-01-06
Payer: MEDICARE

## 2020-01-06 DIAGNOSIS — M79.622 PAIN IN LEFT UPPER ARM: Primary | ICD-10-CM

## 2020-01-06 PROCEDURE — 97140 MANUAL THERAPY 1/> REGIONS: CPT | Performed by: PHYSICAL THERAPIST

## 2020-01-06 PROCEDURE — 97110 THERAPEUTIC EXERCISES: CPT | Performed by: PHYSICAL THERAPIST

## 2020-01-06 NOTE — PROGRESS NOTES
Daily Note     Today's date: 2020  Patient name: Kirill Pa  : 1950  MRN: [id number]  Referring provider: Marla Oneill, *  Dx:   Encounter Diagnosis     ICD-10-CM    1  Pain in left upper arm M79 622                   Subjective: Pt reports it feels like her shoulder is continuing to improve  Objective: See treatment diary below      Assessment: Kimberly Dex tolerated session well  Demonstrates restriction in abduction and ER PROM with tightness reported at end range  Occasional cues needed to perform exercises properly  Reported minor increase in soreness in  lateral shoulder by end of visit  Would benefit from continued PT  Plan: Continue per plan of care        Precautions: DMII      Manual           PROM - SJ SJ DD         GH Mobz Gr (2-3) - LY LY DD                                                    Exercise Diary           Pulleys 3' 5' 5' 5'         Table Slides Flexion 10x10" Flexion 10x10" 10x10"  ea Flex and abd 20x ea         Pendulums - x30 ea x30ea x30 ea         Wall Ladder - 5" x10 5"x10 5"x20         Supine Wand Flexions 20x 20x 20x 20x         Supine Wand ER 20x 20x 20x 20x         Doorway Stretch - nv Low  30"x4 Low  30"x4         Bent over rows - 2x10 3x10 3x10                                                                                                                                                                         Modalities            MHP pre PT -            CP post PT prn - def

## 2020-01-08 ENCOUNTER — OFFICE VISIT (OUTPATIENT)
Dept: PHYSICAL THERAPY | Facility: CLINIC | Age: 70
End: 2020-01-08
Payer: MEDICARE

## 2020-01-08 ENCOUNTER — APPOINTMENT (OUTPATIENT)
Dept: LAB | Facility: CLINIC | Age: 70
End: 2020-01-08
Payer: MEDICARE

## 2020-01-08 DIAGNOSIS — Z78.0 MENOPAUSE: ICD-10-CM

## 2020-01-08 DIAGNOSIS — M79.622 PAIN IN LEFT UPPER ARM: Primary | ICD-10-CM

## 2020-01-08 DIAGNOSIS — Z11.59 ENCOUNTER FOR HEPATITIS C SCREENING TEST FOR LOW RISK PATIENT: ICD-10-CM

## 2020-01-08 DIAGNOSIS — G43.909 MIGRAINE WITHOUT STATUS MIGRAINOSUS, NOT INTRACTABLE, UNSPECIFIED MIGRAINE TYPE: ICD-10-CM

## 2020-01-08 DIAGNOSIS — E78.49 OTHER HYPERLIPIDEMIA: ICD-10-CM

## 2020-01-08 LAB
ALBUMIN SERPL BCP-MCNC: 3.6 G/DL (ref 3.5–5)
ALP SERPL-CCNC: 79 U/L (ref 46–116)
ALT SERPL W P-5'-P-CCNC: 19 U/L (ref 12–78)
ANION GAP SERPL CALCULATED.3IONS-SCNC: 6 MMOL/L (ref 4–13)
AST SERPL W P-5'-P-CCNC: 10 U/L (ref 5–45)
BILIRUB SERPL-MCNC: 0.24 MG/DL (ref 0.2–1)
BUN SERPL-MCNC: 16 MG/DL (ref 5–25)
CALCIUM SERPL-MCNC: 9.1 MG/DL (ref 8.3–10.1)
CHLORIDE SERPL-SCNC: 113 MMOL/L (ref 100–108)
CHOLEST SERPL-MCNC: 152 MG/DL (ref 50–200)
CO2 SERPL-SCNC: 25 MMOL/L (ref 21–32)
CREAT SERPL-MCNC: 1.04 MG/DL (ref 0.6–1.3)
GFR SERPL CREATININE-BSD FRML MDRD: 56 ML/MIN/1.73SQ M
GLUCOSE P FAST SERPL-MCNC: 108 MG/DL (ref 65–99)
HCV AB SER QL: NORMAL
HDLC SERPL-MCNC: 50 MG/DL
LDLC SERPL CALC-MCNC: 73 MG/DL (ref 0–100)
NONHDLC SERPL-MCNC: 102 MG/DL
POTASSIUM SERPL-SCNC: 3.7 MMOL/L (ref 3.5–5.3)
PROT SERPL-MCNC: 7.7 G/DL (ref 6.4–8.2)
SODIUM SERPL-SCNC: 144 MMOL/L (ref 136–145)
TRIGL SERPL-MCNC: 144 MG/DL

## 2020-01-08 PROCEDURE — 80053 COMPREHEN METABOLIC PANEL: CPT

## 2020-01-08 PROCEDURE — 80061 LIPID PANEL: CPT

## 2020-01-08 PROCEDURE — 97110 THERAPEUTIC EXERCISES: CPT | Performed by: PHYSICAL THERAPIST

## 2020-01-08 PROCEDURE — 36415 COLL VENOUS BLD VENIPUNCTURE: CPT

## 2020-01-08 PROCEDURE — 86803 HEPATITIS C AB TEST: CPT

## 2020-01-08 PROCEDURE — 97140 MANUAL THERAPY 1/> REGIONS: CPT | Performed by: PHYSICAL THERAPIST

## 2020-01-08 NOTE — PROGRESS NOTES
Daily Note     Today's date: 2020  Patient name: Yolande Massey  : 1950  MRN: [id number]  Referring provider: Abilio Murray, *  Dx:   Encounter Diagnosis     ICD-10-CM    1  Pain in left upper arm M79 622                   Subjective: Shoulder continues to feel better, but aching remains somewhat significant  Objective: See treatment diary below      Assessment: Yolande Massey was progressed with PT interventions, focusing on appropriate technique and intensity  Pt  Required mod cuing from therapist to complete  Capsular mobility appears to be improving slowly Pt  Would benefit from continued physical therapy to promote improved activity tolerance and function  Plan: Continue per plan of care  Progress treatment as tolerated  Precautions: DMII      Manual          PROM - SJ SJ DD         GH Mobz Gr (2-3) - LY LY DD                                                    Exercise Diary          Pulleys 3' 5' 5' 5' 5'        Table Slides Flexion 10x10" Flexion 10x10" 10x10"  ea Flex and abd 20x ea Flex abd 20x ea        Pendulums - x30 ea x30ea x30 ea 30x ea        Wall Ladder - 5" x10 5"x10 5"x20 5" 20x        Supine Wand Flexions 20x 20x 20x 20x 20x        Supine Wand ER 20x 20x 20x 20x 30x        Doorway Stretch - nv Low  30"x4 Low  30"x4 Low 4x30"   High 4x30" gentle        Bent over rows - 2x10 3x10 3x10 3x10        wallpush ups - - - - 20x                                                                                                                                                           Modalities            MHP pre PT -            CP post PT prn - def

## 2020-01-10 ENCOUNTER — OFFICE VISIT (OUTPATIENT)
Dept: PHYSICAL THERAPY | Facility: CLINIC | Age: 70
End: 2020-01-10
Payer: MEDICARE

## 2020-01-10 DIAGNOSIS — M79.622 PAIN IN LEFT UPPER ARM: Primary | ICD-10-CM

## 2020-01-10 PROCEDURE — 97140 MANUAL THERAPY 1/> REGIONS: CPT | Performed by: PHYSICAL THERAPIST

## 2020-01-10 PROCEDURE — 97110 THERAPEUTIC EXERCISES: CPT | Performed by: PHYSICAL THERAPIST

## 2020-01-10 PROCEDURE — 97112 NEUROMUSCULAR REEDUCATION: CPT | Performed by: PHYSICAL THERAPIST

## 2020-01-10 NOTE — PROGRESS NOTES
Daily Note     Today's date: 1/10/2020  Patient name: Roxie Mcclure  : 1950  MRN: [id number]  Referring provider: Kelli Santos, *  Dx:   Encounter Diagnosis     ICD-10-CM    1  Pain in left upper arm M79 622                   Subjective: Pt  States shoulder continues to feel stiff  Objective: See treatment diary below      Assessment: Tolerated treatment well  Patient demonstrated fatigue post treatment, exhibited good technique with therapeutic exercises and would benefit from continued PT      Plan: Continue per plan of care  Progress treatment as tolerated  Precautions: DMII      Manual  12/26 12/30 1/2 1/6 1/8 1/10       PROM - SJ SJ DD         GH Mobz Gr (2-3) - LY LY DD  15'                                                  Exercise Diary  12/26 12/30 1/2 1/6 1/8 1/10       Pulleys 3' 5' 5' 5' 5' 5'       Table Slides Flexion 10x10" Flexion 10x10" 10x10"  ea Flex and abd 20x ea Flex abd 20x ea Flex ad 30x ea       Pendulums - x30 ea x30ea x30 ea 30x ea 30x        Wall Ladder - 5" x10 5"x10 5"x20 5" 20x 5" 10x       Supine Wand Flexions 20x 20x 20x 20x 20x 30x       Supine Wand ER 20x 20x 20x 20x 30x 30x       Doorway Stretch - nv Low  30"x4 Low  30"x4 Low 4x30"   High 4x30" gentle 4x30" ea       Bent over rows - 2x10 3x10 3x10 3x10 3x10 10#       wallpush ups - - - - 20x 20x       UBE - - - - - 6'       S/L ER      20x       S/L flexion      20x                                                                                                                   Modalities            MHP pre PT -            CP post PT prn - def

## 2020-01-13 ENCOUNTER — OFFICE VISIT (OUTPATIENT)
Dept: PHYSICAL THERAPY | Facility: CLINIC | Age: 70
End: 2020-01-13
Payer: MEDICARE

## 2020-01-13 DIAGNOSIS — M79.622 PAIN IN LEFT UPPER ARM: Primary | ICD-10-CM

## 2020-01-13 PROCEDURE — 97140 MANUAL THERAPY 1/> REGIONS: CPT | Performed by: PHYSICAL THERAPIST

## 2020-01-13 PROCEDURE — 97112 NEUROMUSCULAR REEDUCATION: CPT

## 2020-01-13 PROCEDURE — 97110 THERAPEUTIC EXERCISES: CPT

## 2020-01-13 NOTE — PROGRESS NOTES
Daily Note     Today's date: 2020  Patient name: Alee Stallworth  : 1950  MRN: [id number]  Referring provider: Lea Alex, *  Dx:   Encounter Diagnosis     ICD-10-CM    1  Pain in left upper arm M79 622                   Subjective: Pt reports that she is doing well today with complaints of pain in her L arm, mostly in her biceps region 6/10  Objective: See treatment diary below      Assessment: Tolerated treatment fair  Manuals were performed by PT, LY this session  Focused on mobility and strengthening this session with increased shoulder range present to end  Difficulty with the doorway stretch today with cuing needed for proper form  Patient demonstrated fatigue post treatment, exhibited good technique with therapeutic exercises and would benefit from continued PT      Plan: Continue per plan of care  Precautions: DMII      Manual  12/26 12/30 1/2 1/6 1/8 1/10 1/13      PROM - SJ SJ DD         GH Mobz Gr (2-3) - LY LY DD  15' LY                                                 Exercise Diary  12/26 12/30 1/2 1/6 1/8 1/10 1/13      Pulleys 3' 5' 5' 5' 5' 5' 5'      Table Slides Flexion 10x10" Flexion 10x10" 10x10"  ea Flex and abd 20x ea Flex abd 20x ea Flex ad 30x ea Flex abd 30x ea      Pendulums - x30 ea x30ea x30 ea 30x ea 30x  30x      Wall Ladder - 5" x10 5"x10 5"x20 5" 20x 5" 10x 5"x10      Supine Wand Flexions 20x 20x 20x 20x 20x 30x 30x      Supine Wand ER 20x 20x 20x 20x 30x 30x 30x      Doorway Stretch - nv Low  30"x4 Low  30"x4 Low 4x30"   High 4x30" gentle 4x30" ea 4x30" ea      Bent over rows - 2x10 3x10 3x10 3x10 3x10 10# 3x10 10#      wallpush ups - - - - 20x 20x 20x      UBE - - - - - 6' 6'      S/L ER      20x 20x      S/L flexion      20x 20x                                                                                                                  Modalities            MHP pre PT -            CP post PT prn - def

## 2020-01-14 ENCOUNTER — OFFICE VISIT (OUTPATIENT)
Dept: FAMILY MEDICINE CLINIC | Facility: CLINIC | Age: 70
End: 2020-01-14
Payer: MEDICARE

## 2020-01-14 VITALS
OXYGEN SATURATION: 98 % | DIASTOLIC BLOOD PRESSURE: 70 MMHG | SYSTOLIC BLOOD PRESSURE: 128 MMHG | HEART RATE: 80 BPM | HEIGHT: 64 IN | WEIGHT: 179.01 LBS | BODY MASS INDEX: 30.56 KG/M2

## 2020-01-14 DIAGNOSIS — E11.9 TYPE 2 DIABETES MELLITUS WITHOUT COMPLICATION, WITHOUT LONG-TERM CURRENT USE OF INSULIN (HCC): Primary | ICD-10-CM

## 2020-01-14 DIAGNOSIS — I10 ESSENTIAL HYPERTENSION: ICD-10-CM

## 2020-01-14 DIAGNOSIS — M79.622 PAIN IN LEFT UPPER ARM: ICD-10-CM

## 2020-01-14 PROBLEM — E11.65 TYPE 2 DIABETES MELLITUS WITH HYPERGLYCEMIA, WITHOUT LONG-TERM CURRENT USE OF INSULIN (HCC): Status: ACTIVE | Noted: 2019-03-25

## 2020-01-14 LAB — SL AMB POCT HEMOGLOBIN AIC: 6.1 (ref ?–6.5)

## 2020-01-14 PROCEDURE — 99213 OFFICE O/P EST LOW 20 MIN: CPT | Performed by: NURSE PRACTITIONER

## 2020-01-14 PROCEDURE — 83036 HEMOGLOBIN GLYCOSYLATED A1C: CPT | Performed by: NURSE PRACTITIONER

## 2020-01-14 NOTE — PROGRESS NOTES
BMI Counseling: Body mass index is 30 73 kg/m²  The BMI is above normal  Nutrition recommendations include encouraging healthy choices of fruits and vegetables  Assessment/Plan:         Diagnoses and all orders for this visit:    Type 2 diabetes mellitus without complication, without long-term current use of insulin (HCC)  -     POCT hemoglobin A1c    Pain in left upper arm    Essential hypertension    BMI 30 0-30 9,adult      Continue with physical therapy as she is improving, BP within range  HA1C 6 1, which is excellent for her T2DM    Subjective:      Patient ID: Lauren Hidalgo is a 81 y o  female  Here today for a follow-up related to her recent left arm pain and BP check  Left arm xray was negative, she began physical therapy and reports she is greatly improving  BP within range today  She is with a hx of T2DM, controlled on oral medications with POCT HA1C today within acceptable range  The following portions of the patient's history were reviewed and updated as appropriate: allergies, current medications, past family history, past medical history, past social history, past surgical history and problem list     Review of Systems   Constitutional: Negative  HENT: Negative  Respiratory: Negative  Cardiovascular: Negative  Musculoskeletal:        Please see HPI  Objective:      /70 (BP Location: Right arm, Patient Position: Sitting, Cuff Size: Large)   Pulse 80   Ht 5' 4" (1 626 m)   Wt 81 2 kg (179 lb 0 2 oz)   SpO2 98%   BMI 30 73 kg/m²          Physical Exam   Constitutional: She is oriented to person, place, and time  She appears well-developed and well-nourished  HENT:   Head: Normocephalic and atraumatic  Cardiovascular: Normal rate, regular rhythm and normal heart sounds  Exam reveals no gallop and no friction rub  No murmur heard  Pulmonary/Chest: Effort normal and breath sounds normal  No stridor  No respiratory distress  She has no wheezes   She has no rales  Neurological: She is alert and oriented to person, place, and time  Skin: Skin is warm and dry  Psychiatric: She has a normal mood and affect  Her behavior is normal  Judgment and thought content normal    Vitals reviewed

## 2020-01-15 ENCOUNTER — OFFICE VISIT (OUTPATIENT)
Dept: PHYSICAL THERAPY | Facility: CLINIC | Age: 70
End: 2020-01-15
Payer: MEDICARE

## 2020-01-15 DIAGNOSIS — M79.622 PAIN IN LEFT UPPER ARM: Primary | ICD-10-CM

## 2020-01-15 PROCEDURE — 97140 MANUAL THERAPY 1/> REGIONS: CPT

## 2020-01-15 PROCEDURE — 97110 THERAPEUTIC EXERCISES: CPT

## 2020-01-15 NOTE — PROGRESS NOTES
Daily Note     Today's date: 1/15/2020  Patient name: Kandy Gomes  : 1950  MRN: [id number]  Referring provider: Laura Huizar, *  Dx:   Encounter Diagnosis     ICD-10-CM    1  Pain in left upper arm M79 622                   Subjective: Patient reports shoulder is moving better and she has less pain  Objective: See treatment diary below      Assessment: Kandy Gomes continues with good  progress towards goals  Patient pain appears to be decreasing  she required moderate verbal cueing to complete exercises appropriate form and intensity  Patient pain should resolve with continued treatments  Plan: Continue per plan of care  Precautions: DMII      Manual  12/26 12/30 1/2 1/6 1/8 1/10 1/13 1/15     PROM - SJ SJ DD    SJ     GH [employer] Gr (2-3) - LY LY DD  15' LY                                                 Exercise Diary  12/26 12/30 1/2 1/6 1/8 1/10 1/13 1/15     Pulleys 3' 5' 5' 5' 5' 5' 5' 5'     Table Slides Flexion 10x10" Flexion 10x10" 10x10"  ea Flex and abd 20x ea Flex abd 20x ea Flex ad 30x ea Flex abd 30x ea Flex abd 30x ea     Pendulums - x30 ea x30ea x30 ea 30x ea 30x  30x 30x     Wall Ladder - 5" x10 5"x10 5"x20 5" 20x 5" 10x 5"x10 10"x10     Supine Wand Flexions 20x 20x 20x 20x 20x 30x 30x x30     Supine Wand ER 20x 20x 20x 20x 30x 30x 30x x30     Doorway Stretch - nv Low  30"x4 Low  30"x4 Low 4x30"   High 4x30" gentle 4x30" ea 4x30" ea 4x30" ea     Bent over rows - 2x10 3x10 3x10 3x10 3x10 10# 3x10 10# 3x10 10#     wallpush ups - - - - 20x 20x 20x 20x     UBE - - - - - 6' 6' 7'     S/L ER      20x 20x 20x     S/L flexion      20x 20x 20x                                                                                                                 Modalities   1          MHP pre PT -            CP post PT prn - def

## 2020-01-17 ENCOUNTER — OFFICE VISIT (OUTPATIENT)
Dept: PHYSICAL THERAPY | Facility: CLINIC | Age: 70
End: 2020-01-17
Payer: MEDICARE

## 2020-01-17 DIAGNOSIS — M79.622 PAIN IN LEFT UPPER ARM: Primary | ICD-10-CM

## 2020-01-17 PROCEDURE — 97110 THERAPEUTIC EXERCISES: CPT | Performed by: PHYSICAL THERAPIST

## 2020-01-17 PROCEDURE — 97140 MANUAL THERAPY 1/> REGIONS: CPT | Performed by: PHYSICAL THERAPIST

## 2020-01-17 PROCEDURE — 97112 NEUROMUSCULAR REEDUCATION: CPT | Performed by: PHYSICAL THERAPIST

## 2020-01-17 NOTE — PROGRESS NOTES
Daily Note     Today's date: 2020  Patient name: Indigo Ortega  : 1950  MRN: [id number]  Referring provider: Pasquale Healy, *  Dx:   Encounter Diagnosis     ICD-10-CM    1  Pain in left upper arm M79 622               Subjective: Patient reporting discomfort with reaching behind her back and when lifting a gallon of milk out of the upper shelf of the refrigerator  Otherwise no pain complaints  Objective: See treatment diary below      Assessment: Indigo Ortega is doing very well  Continue to work Shoulder IR and ER and conditioning  Plan: Continue per plan of care  Precautions: DMII      Manual  12/26 12/30 1/2 1/6 1/8 1/10 1/13 1/15 1/17    PROM - SJ SJ DD    SJ [facility] Mobz Gr (2-3) - LY LY DD  15' LY                                                 Exercise Diary  12/26 12/30 1/2 1/6 1/8 1/10 1/13 1/15 1/17    Pulleys 3' 5' 5' 5' 5' 5' 5' 5' 5'    Table Slides Flexion 10x10" Flexion 10x10" 10x10"  ea Flex and abd 20x ea Flex abd 20x ea Flex ad 30x ea Flex abd 30x ea Flex abd 30x ea np    Pendulums - x30 ea x30ea x30 ea 30x ea 30x  30x 30x 30x    Wall Ladder - 5" x10 5"x10 5"x20 5" 20x 5" 10x 5"x10 10"x10 10"  x10    Supine Wand Flexions 20x 20x 20x 20x 20x 30x 30x x30 x30    Supine Wand ER 20x 20x 20x 20x 30x 30x 30x x30 x30    Doorway Stretch - nv Low  30"x4 Low  30"x4 Low 4x30"   High 4x30" gentle 4x30" ea 4x30" ea 4x30" ea 4x30"    Bent over rows - 2x10 3x10 3x10 3x10 3x10 10# 3x10 10# 3x10 10# 3x10  15#    wallpush ups - - - - 20x 20x 20x 20x 20x    UBE - - - - - 6' 6' 7'     S/L ER      20x 20x 20x 4#  2x10    S/L flexion      20x 20x 20x 30x                                                                                                                Modalities            MHP pre PT -            CP post PT prn - def

## 2020-01-19 ENCOUNTER — APPOINTMENT (EMERGENCY)
Dept: RADIOLOGY | Facility: HOSPITAL | Age: 70
DRG: 872 | End: 2020-01-19
Payer: MEDICARE

## 2020-01-19 ENCOUNTER — HOSPITAL ENCOUNTER (INPATIENT)
Facility: HOSPITAL | Age: 70
LOS: 2 days | Discharge: HOME | DRG: 872 | End: 2020-01-22
Attending: EMERGENCY MEDICINE | Admitting: FAMILY MEDICINE
Payer: MEDICARE

## 2020-01-19 DIAGNOSIS — G40.909 SEIZURE DISORDER (HCC): ICD-10-CM

## 2020-01-19 DIAGNOSIS — J10.1 INFLUENZA A: Primary | ICD-10-CM

## 2020-01-19 DIAGNOSIS — E87.2 LACTIC ACID ACIDOSIS: ICD-10-CM

## 2020-01-19 DIAGNOSIS — R05.9 COUGH: ICD-10-CM

## 2020-01-19 LAB
BASOPHILS # BLD AUTO: 0.04 THOUSANDS/ΜL (ref 0–0.1)
BASOPHILS NFR BLD AUTO: 0 % (ref 0–1)
EOSINOPHIL # BLD AUTO: 0.02 THOUSAND/ΜL (ref 0–0.61)
EOSINOPHIL NFR BLD AUTO: 0 % (ref 0–6)
ERYTHROCYTE [DISTWIDTH] IN BLOOD BY AUTOMATED COUNT: 12.3 % (ref 11.6–15.1)
HCT VFR BLD AUTO: 40.4 % (ref 34.8–46.1)
HGB BLD-MCNC: 13.6 G/DL (ref 11.5–15.4)
IMM GRANULOCYTES # BLD AUTO: 0.03 THOUSAND/UL (ref 0–0.2)
IMM GRANULOCYTES NFR BLD AUTO: 0 % (ref 0–2)
LYMPHOCYTES # BLD AUTO: 0.82 THOUSANDS/ΜL (ref 0.6–4.47)
LYMPHOCYTES NFR BLD AUTO: 7 % (ref 14–44)
MCH RBC QN AUTO: 31.4 PG (ref 26.8–34.3)
MCHC RBC AUTO-ENTMCNC: 33.7 G/DL (ref 31.4–37.4)
MCV RBC AUTO: 93 FL (ref 82–98)
MONOCYTES # BLD AUTO: 1.03 THOUSAND/ΜL (ref 0.17–1.22)
MONOCYTES NFR BLD AUTO: 9 % (ref 4–12)
NEUTROPHILS # BLD AUTO: 9.27 THOUSANDS/ΜL (ref 1.85–7.62)
NEUTS SEG NFR BLD AUTO: 84 % (ref 43–75)
NRBC BLD AUTO-RTO: 0 /100 WBCS
PLATELET # BLD AUTO: 223 THOUSANDS/UL (ref 149–390)
PMV BLD AUTO: 9.9 FL (ref 8.9–12.7)
RBC # BLD AUTO: 4.33 MILLION/UL (ref 3.81–5.12)
WBC # BLD AUTO: 11.21 THOUSAND/UL (ref 4.31–10.16)

## 2020-01-19 PROCEDURE — 87040 BLOOD CULTURE FOR BACTERIA: CPT | Performed by: EMERGENCY MEDICINE

## 2020-01-19 PROCEDURE — 93005 ELECTROCARDIOGRAM TRACING: CPT

## 2020-01-19 PROCEDURE — 96360 HYDRATION IV INFUSION INIT: CPT

## 2020-01-19 PROCEDURE — 36415 COLL VENOUS BLD VENIPUNCTURE: CPT | Performed by: EMERGENCY MEDICINE

## 2020-01-19 PROCEDURE — 85610 PROTHROMBIN TIME: CPT | Performed by: EMERGENCY MEDICINE

## 2020-01-19 PROCEDURE — 85730 THROMBOPLASTIN TIME PARTIAL: CPT | Performed by: EMERGENCY MEDICINE

## 2020-01-19 PROCEDURE — 99285 EMERGENCY DEPT VISIT HI MDM: CPT | Performed by: EMERGENCY MEDICINE

## 2020-01-19 PROCEDURE — 87631 RESP VIRUS 3-5 TARGETS: CPT | Performed by: EMERGENCY MEDICINE

## 2020-01-19 PROCEDURE — 85025 COMPLETE CBC W/AUTO DIFF WBC: CPT | Performed by: EMERGENCY MEDICINE

## 2020-01-19 PROCEDURE — 99285 EMERGENCY DEPT VISIT HI MDM: CPT

## 2020-01-19 PROCEDURE — 80053 COMPREHEN METABOLIC PANEL: CPT | Performed by: EMERGENCY MEDICINE

## 2020-01-19 PROCEDURE — 84145 PROCALCITONIN (PCT): CPT | Performed by: EMERGENCY MEDICINE

## 2020-01-19 PROCEDURE — 83605 ASSAY OF LACTIC ACID: CPT | Performed by: EMERGENCY MEDICINE

## 2020-01-19 PROCEDURE — 71045 X-RAY EXAM CHEST 1 VIEW: CPT

## 2020-01-19 RX ORDER — SODIUM CHLORIDE 9 MG/ML
3 INJECTION INTRAVENOUS AS NEEDED
Status: DISCONTINUED | OUTPATIENT
Start: 2020-01-19 | End: 2020-01-22 | Stop reason: HOSPADM

## 2020-01-19 RX ORDER — IPRATROPIUM BROMIDE AND ALBUTEROL SULFATE .5; 3 MG/3ML; MG/3ML
2 SOLUTION RESPIRATORY (INHALATION) ONCE
Status: COMPLETED | OUTPATIENT
Start: 2020-01-20 | End: 2020-01-20

## 2020-01-19 RX ORDER — ACETAMINOPHEN 160 MG/5ML
1 SUSPENSION, ORAL (FINAL DOSE FORM) ORAL ONCE
Status: COMPLETED | OUTPATIENT
Start: 2020-01-20 | End: 2020-01-20

## 2020-01-19 RX ADMIN — SODIUM CHLORIDE 1000 ML: 0.9 INJECTION, SOLUTION INTRAVENOUS at 23:47

## 2020-01-20 ENCOUNTER — APPOINTMENT (OUTPATIENT)
Dept: PHYSICAL THERAPY | Facility: CLINIC | Age: 70
End: 2020-01-20
Payer: MEDICARE

## 2020-01-20 PROBLEM — Z98.890 S/P CRANIOTOMY: Status: ACTIVE | Noted: 2020-01-20

## 2020-01-20 PROBLEM — A41.9 SEVERE SEPSIS (HCC): Status: ACTIVE | Noted: 2020-01-20

## 2020-01-20 PROBLEM — R65.20 SEVERE SEPSIS (HCC): Status: ACTIVE | Noted: 2020-01-20

## 2020-01-20 LAB
ALBUMIN SERPL BCP-MCNC: 3.6 G/DL (ref 3.5–5)
ALP SERPL-CCNC: 70 U/L (ref 46–116)
ALT SERPL W P-5'-P-CCNC: 15 U/L (ref 12–78)
ANION GAP SERPL CALCULATED.3IONS-SCNC: 12 MMOL/L (ref 4–13)
ANION GAP SERPL CALCULATED.3IONS-SCNC: 13 MMOL/L (ref 4–13)
APTT PPP: 24 SECONDS (ref 23–37)
AST SERPL W P-5'-P-CCNC: 16 U/L (ref 5–45)
BASOPHILS # BLD AUTO: 0.02 THOUSANDS/ΜL (ref 0–0.1)
BASOPHILS NFR BLD AUTO: 0 % (ref 0–1)
BILIRUB SERPL-MCNC: 0.37 MG/DL (ref 0.2–1)
BILIRUB UR QL STRIP: NEGATIVE
BUN SERPL-MCNC: 15 MG/DL (ref 5–25)
BUN SERPL-MCNC: 18 MG/DL (ref 5–25)
CALCIUM SERPL-MCNC: 7.5 MG/DL (ref 8.3–10.1)
CALCIUM SERPL-MCNC: 8.5 MG/DL (ref 8.3–10.1)
CHLORIDE SERPL-SCNC: 101 MMOL/L (ref 100–108)
CHLORIDE SERPL-SCNC: 111 MMOL/L (ref 100–108)
CLARITY UR: CLEAR
CO2 SERPL-SCNC: 21 MMOL/L (ref 21–32)
CO2 SERPL-SCNC: 21 MMOL/L (ref 21–32)
COLOR UR: YELLOW
CREAT SERPL-MCNC: 0.86 MG/DL (ref 0.6–1.3)
CREAT SERPL-MCNC: 1.11 MG/DL (ref 0.6–1.3)
EOSINOPHIL # BLD AUTO: 0.01 THOUSAND/ΜL (ref 0–0.61)
EOSINOPHIL NFR BLD AUTO: 0 % (ref 0–6)
ERYTHROCYTE [DISTWIDTH] IN BLOOD BY AUTOMATED COUNT: 12.6 % (ref 11.6–15.1)
EST. AVERAGE GLUCOSE BLD GHB EST-MCNC: 143 MG/DL
FLUAV RNA NPH QL NAA+PROBE: DETECTED
FLUBV RNA NPH QL NAA+PROBE: ABNORMAL
GFR SERPL CREATININE-BSD FRML MDRD: 52 ML/MIN/1.73SQ M
GFR SERPL CREATININE-BSD FRML MDRD: 70 ML/MIN/1.73SQ M
GLUCOSE SERPL-MCNC: 110 MG/DL (ref 65–140)
GLUCOSE SERPL-MCNC: 145 MG/DL (ref 65–140)
GLUCOSE SERPL-MCNC: 178 MG/DL (ref 65–140)
GLUCOSE SERPL-MCNC: 87 MG/DL (ref 65–140)
GLUCOSE SERPL-MCNC: 95 MG/DL (ref 65–140)
GLUCOSE SERPL-MCNC: 98 MG/DL (ref 65–140)
GLUCOSE UR STRIP-MCNC: NEGATIVE MG/DL
HBA1C MFR BLD: 6.6 % (ref 4.2–6.3)
HCT VFR BLD AUTO: 35.9 % (ref 34.8–46.1)
HGB BLD-MCNC: 11.7 G/DL (ref 11.5–15.4)
HGB UR QL STRIP.AUTO: NEGATIVE
IMM GRANULOCYTES # BLD AUTO: 0.01 THOUSAND/UL (ref 0–0.2)
IMM GRANULOCYTES NFR BLD AUTO: 0 % (ref 0–2)
INR PPP: 1.06 (ref 0.84–1.19)
KETONES UR STRIP-MCNC: NEGATIVE MG/DL
LACTATE SERPL-SCNC: 1.4 MMOL/L (ref 0.5–2)
LACTATE SERPL-SCNC: 2.6 MMOL/L (ref 0.5–2)
LACTATE SERPL-SCNC: 2.8 MMOL/L (ref 0.5–2)
LEUKOCYTE ESTERASE UR QL STRIP: NEGATIVE
LYMPHOCYTES # BLD AUTO: 0.64 THOUSANDS/ΜL (ref 0.6–4.47)
LYMPHOCYTES NFR BLD AUTO: 10 % (ref 14–44)
MCH RBC QN AUTO: 31.5 PG (ref 26.8–34.3)
MCHC RBC AUTO-ENTMCNC: 32.6 G/DL (ref 31.4–37.4)
MCV RBC AUTO: 97 FL (ref 82–98)
MONOCYTES # BLD AUTO: 0.65 THOUSAND/ΜL (ref 0.17–1.22)
MONOCYTES NFR BLD AUTO: 10 % (ref 4–12)
NEUTROPHILS # BLD AUTO: 5.14 THOUSANDS/ΜL (ref 1.85–7.62)
NEUTS SEG NFR BLD AUTO: 80 % (ref 43–75)
NITRITE UR QL STRIP: NEGATIVE
NRBC BLD AUTO-RTO: 0 /100 WBCS
PH UR STRIP.AUTO: 6.5 [PH]
PLATELET # BLD AUTO: 163 THOUSANDS/UL (ref 149–390)
PMV BLD AUTO: 9.7 FL (ref 8.9–12.7)
POTASSIUM SERPL-SCNC: 3.5 MMOL/L (ref 3.5–5.3)
POTASSIUM SERPL-SCNC: 3.7 MMOL/L (ref 3.5–5.3)
PROCALCITONIN SERPL-MCNC: 0.07 NG/ML
PROCALCITONIN SERPL-MCNC: 0.12 NG/ML
PROT SERPL-MCNC: 8.2 G/DL (ref 6.4–8.2)
PROT UR STRIP-MCNC: NEGATIVE MG/DL
PROTHROMBIN TIME: 13.8 SECONDS (ref 11.6–14.5)
RBC # BLD AUTO: 3.71 MILLION/UL (ref 3.81–5.12)
RSV RNA NPH QL NAA+PROBE: ABNORMAL
SODIUM SERPL-SCNC: 135 MMOL/L (ref 136–145)
SODIUM SERPL-SCNC: 144 MMOL/L (ref 136–145)
SP GR UR STRIP.AUTO: 1.01 (ref 1–1.03)
UROBILINOGEN UR QL STRIP.AUTO: 0.2 E.U./DL
WBC # BLD AUTO: 6.47 THOUSAND/UL (ref 4.31–10.16)

## 2020-01-20 PROCEDURE — 82948 REAGENT STRIP/BLOOD GLUCOSE: CPT

## 2020-01-20 PROCEDURE — 80177 DRUG SCRN QUAN LEVETIRACETAM: CPT | Performed by: NURSE PRACTITIONER

## 2020-01-20 PROCEDURE — 80201 ASSAY OF TOPIRAMATE: CPT | Performed by: NURSE PRACTITIONER

## 2020-01-20 PROCEDURE — 99223 1ST HOSP IP/OBS HIGH 75: CPT | Performed by: FAMILY MEDICINE

## 2020-01-20 PROCEDURE — 83605 ASSAY OF LACTIC ACID: CPT | Performed by: EMERGENCY MEDICINE

## 2020-01-20 PROCEDURE — 85025 COMPLETE CBC W/AUTO DIFF WBC: CPT | Performed by: NURSE PRACTITIONER

## 2020-01-20 PROCEDURE — 83036 HEMOGLOBIN GLYCOSYLATED A1C: CPT | Performed by: NURSE PRACTITIONER

## 2020-01-20 PROCEDURE — 80048 BASIC METABOLIC PNL TOTAL CA: CPT | Performed by: NURSE PRACTITIONER

## 2020-01-20 PROCEDURE — 83605 ASSAY OF LACTIC ACID: CPT | Performed by: NURSE PRACTITIONER

## 2020-01-20 PROCEDURE — 84145 PROCALCITONIN (PCT): CPT | Performed by: NURSE PRACTITIONER

## 2020-01-20 PROCEDURE — 81003 URINALYSIS AUTO W/O SCOPE: CPT | Performed by: EMERGENCY MEDICINE

## 2020-01-20 RX ORDER — ESCITALOPRAM OXALATE 10 MG/1
20 TABLET ORAL DAILY
Status: DISCONTINUED | OUTPATIENT
Start: 2020-01-20 | End: 2020-01-22 | Stop reason: HOSPADM

## 2020-01-20 RX ORDER — SODIUM CHLORIDE 9 MG/ML
75 INJECTION, SOLUTION INTRAVENOUS CONTINUOUS
Status: DISPENSED | OUTPATIENT
Start: 2020-01-20 | End: 2020-01-21

## 2020-01-20 RX ORDER — TRAZODONE HYDROCHLORIDE 50 MG/1
100 TABLET ORAL
Status: DISCONTINUED | OUTPATIENT
Start: 2020-01-20 | End: 2020-01-22 | Stop reason: HOSPADM

## 2020-01-20 RX ORDER — LEVETIRACETAM 500 MG/1
750 TABLET ORAL 2 TIMES DAILY
Status: DISCONTINUED | OUTPATIENT
Start: 2020-01-21 | End: 2020-01-20

## 2020-01-20 RX ORDER — TOPIRAMATE 100 MG/1
100 TABLET, FILM COATED ORAL 2 TIMES DAILY
Status: DISCONTINUED | OUTPATIENT
Start: 2020-01-20 | End: 2020-01-20

## 2020-01-20 RX ORDER — ASPIRIN 81 MG/1
81 TABLET, CHEWABLE ORAL DAILY
Status: DISCONTINUED | OUTPATIENT
Start: 2020-01-20 | End: 2020-01-22 | Stop reason: HOSPADM

## 2020-01-20 RX ORDER — PANTOPRAZOLE SODIUM 20 MG/1
20 TABLET, DELAYED RELEASE ORAL
Status: DISCONTINUED | OUTPATIENT
Start: 2020-01-20 | End: 2020-01-22 | Stop reason: HOSPADM

## 2020-01-20 RX ORDER — ATORVASTATIN CALCIUM 20 MG/1
20 TABLET, FILM COATED ORAL
Status: DISCONTINUED | OUTPATIENT
Start: 2020-01-20 | End: 2020-01-22 | Stop reason: HOSPADM

## 2020-01-20 RX ORDER — VENLAFAXINE HYDROCHLORIDE 37.5 MG/1
75 CAPSULE, EXTENDED RELEASE ORAL DAILY
Status: DISCONTINUED | OUTPATIENT
Start: 2020-01-20 | End: 2020-01-22 | Stop reason: HOSPADM

## 2020-01-20 RX ORDER — ACETAMINOPHEN 325 MG/1
650 TABLET ORAL ONCE
Status: COMPLETED | OUTPATIENT
Start: 2020-01-20 | End: 2020-01-20

## 2020-01-20 RX ORDER — OSELTAMIVIR PHOSPHATE 30 MG/1
30 CAPSULE ORAL EVERY 12 HOURS SCHEDULED
Status: DISCONTINUED | OUTPATIENT
Start: 2020-01-20 | End: 2020-01-22 | Stop reason: HOSPADM

## 2020-01-20 RX ORDER — AMLODIPINE BESYLATE 5 MG/1
5 TABLET ORAL DAILY
Status: DISCONTINUED | OUTPATIENT
Start: 2020-01-20 | End: 2020-01-22 | Stop reason: HOSPADM

## 2020-01-20 RX ORDER — ACETAMINOPHEN 325 MG/1
650 TABLET ORAL EVERY 6 HOURS PRN
Status: DISCONTINUED | OUTPATIENT
Start: 2020-01-20 | End: 2020-01-22 | Stop reason: HOSPADM

## 2020-01-20 RX ORDER — LEVETIRACETAM 500 MG/1
750 TABLET ORAL 2 TIMES DAILY
Status: DISCONTINUED | OUTPATIENT
Start: 2020-01-20 | End: 2020-01-22 | Stop reason: HOSPADM

## 2020-01-20 RX ORDER — GUAIFENESIN/DEXTROMETHORPHAN 100-10MG/5
10 SYRUP ORAL EVERY 4 HOURS PRN
Status: DISCONTINUED | OUTPATIENT
Start: 2020-01-20 | End: 2020-01-22 | Stop reason: HOSPADM

## 2020-01-20 RX ORDER — TOPIRAMATE 100 MG/1
200 TABLET, FILM COATED ORAL 2 TIMES DAILY
Status: DISCONTINUED | OUTPATIENT
Start: 2020-01-20 | End: 2020-01-22 | Stop reason: HOSPADM

## 2020-01-20 RX ADMIN — LEVETIRACETAM 2250 MG: 250 TABLET, FILM COATED ORAL at 17:17

## 2020-01-20 RX ADMIN — TOPIRAMATE 100 MG: 100 TABLET, FILM COATED ORAL at 10:05

## 2020-01-20 RX ADMIN — ENOXAPARIN SODIUM 40 MG: 40 INJECTION SUBCUTANEOUS at 10:06

## 2020-01-20 RX ADMIN — ESCITALOPRAM OXALATE 20 MG: 10 TABLET ORAL at 10:04

## 2020-01-20 RX ADMIN — LEVETIRACETAM 2250 MG: 250 TABLET, FILM COATED ORAL at 10:06

## 2020-01-20 RX ADMIN — OSELTAMIVIR PHOSPHATE 30 MG: 30 CAPSULE ORAL at 10:23

## 2020-01-20 RX ADMIN — TRAZODONE HYDROCHLORIDE 100 MG: 50 TABLET ORAL at 21:31

## 2020-01-20 RX ADMIN — SODIUM CHLORIDE 1000 ML: 0.9 INJECTION, SOLUTION INTRAVENOUS at 00:21

## 2020-01-20 RX ADMIN — TOPIRAMATE 200 MG: 100 TABLET, FILM COATED ORAL at 10:06

## 2020-01-20 RX ADMIN — SODIUM CHLORIDE 75 ML/HR: 0.9 INJECTION, SOLUTION INTRAVENOUS at 23:53

## 2020-01-20 RX ADMIN — PANTOPRAZOLE SODIUM 20 MG: 20 TABLET, DELAYED RELEASE ORAL at 05:59

## 2020-01-20 RX ADMIN — ATORVASTATIN CALCIUM 20 MG: 20 TABLET, FILM COATED ORAL at 21:31

## 2020-01-20 RX ADMIN — SODIUM CHLORIDE 1000 ML: 0.9 INJECTION, SOLUTION INTRAVENOUS at 00:53

## 2020-01-20 RX ADMIN — ASPIRIN 81 MG 81 MG: 81 TABLET ORAL at 10:05

## 2020-01-20 RX ADMIN — OSELTAMIVIR PHOSPHATE 30 MG: 30 CAPSULE ORAL at 02:28

## 2020-01-20 RX ADMIN — GUAIFENESIN AND DEXTROMETHORPHAN 10 ML: 100; 10 SYRUP ORAL at 02:28

## 2020-01-20 RX ADMIN — ACETAMINOPHEN 650 MG: 325 TABLET ORAL at 00:46

## 2020-01-20 RX ADMIN — SODIUM CHLORIDE 75 ML/HR: 0.9 INJECTION, SOLUTION INTRAVENOUS at 10:23

## 2020-01-20 RX ADMIN — OSELTAMIVIR PHOSPHATE 30 MG: 30 CAPSULE ORAL at 21:31

## 2020-01-20 RX ADMIN — SODIUM CHLORIDE 1000 ML: 0.9 INJECTION, SOLUTION INTRAVENOUS at 02:50

## 2020-01-20 RX ADMIN — SODIUM CHLORIDE 75 ML/HR: 0.9 INJECTION, SOLUTION INTRAVENOUS at 02:28

## 2020-01-20 RX ADMIN — VENLAFAXINE HYDROCHLORIDE 75 MG: 37.5 CAPSULE, EXTENDED RELEASE ORAL at 10:04

## 2020-01-20 RX ADMIN — TOPIRAMATE 200 MG: 100 TABLET, FILM COATED ORAL at 19:14

## 2020-01-20 RX ADMIN — TOPIRAMATE 200 MG: 100 TABLET, FILM COATED ORAL at 17:17

## 2020-01-20 RX ADMIN — Medication 1 TABLET: at 10:05

## 2020-01-20 RX ADMIN — LEVETIRACETAM 750 MG: 250 TABLET, FILM COATED ORAL at 19:14

## 2020-01-20 RX ADMIN — TOPIRAMATE 100 MG: 100 TABLET, FILM COATED ORAL at 17:17

## 2020-01-20 RX ADMIN — GUAIFENESIN AND DEXTROMETHORPHAN 10 ML: 100; 10 SYRUP ORAL at 17:18

## 2020-01-20 RX ADMIN — AMLODIPINE BESYLATE 5 MG: 5 TABLET ORAL at 10:05

## 2020-01-20 NOTE — H&P
[address] [facility] Internal Medicine    H&P- Kailashchristel Picketting 1950, 81 y o  female MRN: [id number]    Unit/Bed#: -[facility] Encounter: [id number]    Primary Care Provider: JENI Rivero   Date and time admitted to hospital: 1/19/2020 11:37 PM        * Severe sepsis ([facility] [address] )  Assessment & Plan  · POA fever, tachycardia, suspected infection, lactic acid 2 4  · Influenza A positive  · Received fluid bolus in the ER  · Antibiotics not started in the ER due influenza being likely source of SIRS and lactic acidosis  · Chest x-ray: No focal consolidation  Radiology read pending   · WBC: 11  · Will start Tamiflu  · Check procalcitonin  · Will hold off on antibiotics at this point  · Normal saline at 75 an hour  · Trend lactate until normalizes    Seizure disorder ([facility] [address] )  Assessment & Plan  · 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 at [facility]  · No current seizure activity  · Continue Keppra and Topamax  · Check levels    Hypertension  Assessment & Plan  · BP reviewed and acceptable  · Continue amlodipine  · Monitor blood pressures    Type 2 diabetes mellitus with hyperglycemia, without long-term current use of insulin ([facility])  Assessment & Plan  Lab Results   Component Value Date    HGBA1C 6 1 01/14/2020       No results for input(s): POCGLU in the last 72 hours  Blood Sugar Average: Last 72 hrs:     · Hold home Glucophage  · Fingerstick blood sugars with sliding scale coverage  · Diabetic diet          VTE Prophylaxis: Enoxaparin (Lovenox)  / sequential compression device   Code Status: full  POLST: POLST form is not discussed and not completed at this time  Discussion with family: patient    Anticipated Length of Stay:  Patient will be admitted on an Inpatient basis with an anticipated length of stay of  greater than 2 midnights  Justification for Hospital Stay:  Sepsis    Total Time for Visit, including Counseling / Coordination of Care: 45 minutes    Greater than 50% of this total time spent on direct patient counseling and coordination of care  Chief Complaint:  Fever and body aches    History of Present Illness:    Susan Marte is a 81 y o  female with history of type 2 non-insulin-dependent diabetes, essential hypertension, seizure disorder, and craniotomy for ganglioglioma who presents with a two day history of fever, body aches, and upper respiratory symptoms  She said her symptoms started suddenly and worsened  She reports productive cough, nasal congestion, and feeling generally poor  Symptoms are constant, not worsened or relieved by anything  Review of Systems:    Review of Systems   Constitutional: Positive for chills and fever  HENT: Positive for rhinorrhea  Respiratory: Positive for cough and shortness of breath  Cardiovascular: Negative for chest pain, palpitations and leg swelling  Gastrointestinal: Negative for abdominal distention, abdominal pain, constipation, diarrhea, nausea and vomiting  Genitourinary: Negative for dysuria and frequency  Musculoskeletal: Positive for myalgias  Negative for arthralgias  Neurological: Negative for dizziness, syncope, weakness and headaches  All other systems reviewed and are negative  Past Medical and Surgical History:     Past Medical History:   Diagnosis Date    Cervicalgia     Depression     Diabetes mellitus ([facility] [address] )     Esophageal reflux     Hyperlipidemia     Hypertension     Essential    Migraine     Nonspecific abnormal electrocardiogram (ECG)     Sciatica     Seizure disorder (HCC)     Sjogren's syndrome (HCC)        Past Surgical History:   Procedure Laterality Date    CERVICAL POLYPECTOMY      CRANIOTOMY       Left frontal craniotomy for resection of tumor-Dr Jamila Blood [place]     TUBAL LIGATION         Meds/Allergies:    Prior to Admission medications    Medication Sig Start Date End Date Taking?  Authorizing Provider   amLODIPine (NORVASC) 2 5 mg tablet Take 2 tablets (5 mg total) by mouth daily 12/16/19   JENI Israel   ASPIRIN 81 PO Take 81 mg by mouth daily 7/26/16   Historical Provider, MD   atorvastatin (LIPITOR) 20 mg tablet TAKE 1 TABLET AT BEDTIME 12/16/19   JENI Israel   diclofenac sodium (VOLTAREN) 1 % Apply 1 application topically Three times daily as needed 2/2/18   Historical Provider, MD   escitalopram (LEXAPRO) 20 mg tablet Take 20 mg by mouth daily 11/5/09   Historical Provider, MD   estrogen, conjugated,-medroxyprogesterone (PREMPRO) 0 625-5 MG per tablet Take 1 tablet by mouth daily 10/24/19 1/22/20  JENI Israel   lansoprazole (PREVACID) 15 mg capsule Take 15 mg by mouth daily 7/16/09   Historical Provider, MD   levETIRAcetam (KEPPRA) 750 mg tablet Take 2,250 mg by mouth 2 (two) times a day 2/27/15   Historical Provider, MD   metFORMIN (GLUCOPHAGE-XR) 500 mg 24 hr tablet Take 1 tablet (500 mg total) by mouth daily with breakfast 12/23/19   JENI Israel   Multiple Vitamins-Minerals (MULTIVITAMIN WITH MINERALS) tablet Take 1 tablet by mouth daily    Historical Provider, MD   topiramate (TOPAMAX) 100 mg tablet Take 100 mg by mouth 2 (two) times a day 5/9/14   Historical Provider, MD   topiramate (TOPAMAX) 200 MG tablet Take 200 mg by mouth 2 (two) times a day 3/11/19   Historical Provider, MD   traZODone (DESYREL) 100 mg tablet Take 100 mg by mouth daily at bedtime 2/11/08   Historical Provider, MD   venlafaxine (EFFEXOR-XR) 75 mg 24 hr capsule Take 75 mg by mouth daily 11/13/14   Historical Provider, MD     I have reviewed home medications with patient personally  Allergies:    Allergies   Allergen Reactions    Lamotrigine      Other reaction(s): LAMOTRIGINE (LAMICTAL) rash       Social History:     Marital Status: /Civil Union   Occupation:  Not discussed  Patient Pre-hospital Living Situation:  Home  Patient Pre-hospital Level of Mobility:  Independent  Patient Pre-hospital Diet Restrictions: Diabetic  Substance Use History:   Social History     Substance and Sexual Activity   Alcohol Use Never    Frequency: Never     Social History     Tobacco Use   Smoking Status Never Smoker   Smokeless Tobacco Never Used     Social History     Substance and Sexual Activity   Drug Use Never       Family History:    Family History   Problem Relation Age of Onset    Heart disease Brother     Heart disease Father     Diabetes Maternal Aunt     Diabetes Maternal Grandmother     Aneurysm Mother     Lupus Sister     Thyroid cancer Sister         age unknown    No Known Problems Sister     No Known Problems Sister     No Known Problems Maternal Aunt     No Known Problems Maternal Aunt     No Known Problems Paternal Aunt        Physical Exam:     Vitals:   Blood Pressure: 135/62 (01/20/20 0042)  Pulse: (!) 109 (01/20/20 0042)  Temperature: 99 9 °F (37 7 °C) (01/20/20 0042)  Temp Source: Temporal (01/20/20 0042)  Respirations: 18 (01/20/20 0042)  Weight - Scale: 85 kg (187 lb 6 3 oz) (01/19/20 2345)  SpO2: 94 % (01/20/20 0042)    Physical Exam   Constitutional: She is oriented to person, place, and time  She appears well-developed and well-nourished  HENT:   Head: Normocephalic and atraumatic  Mouth/Throat: Oropharynx is clear and moist    Eyes: Pupils are equal, round, and reactive to light  EOM are normal    Neck: Normal range of motion  Neck supple  Cardiovascular: Normal rate, regular rhythm, normal heart sounds and intact distal pulses  Exam reveals no gallop and no friction rub  No murmur heard  Pulmonary/Chest: Effort normal  No respiratory distress  She has wheezes  Wheezes right middle lung field anteriorly   Abdominal: Soft  Bowel sounds are normal  She exhibits no distension and no mass  There is no tenderness  There is no guarding  Musculoskeletal: Normal range of motion  She exhibits no edema, tenderness or deformity  Neurological: She is alert and oriented to person, place, and time  Skin: Skin is warm and dry  Capillary refill takes less than 2 seconds  Nursing note and vitals reviewed  Additional Data:     Lab Results: I have personally reviewed pertinent reports  Results from last 7 days   Lab Units 01/20/20  0511   WBC Thousand/uL 6 47   HEMOGLOBIN g/dL 11 7   HEMATOCRIT % 35 9   PLATELETS Thousands/uL 163   NEUTROS PCT % 80*   LYMPHS PCT % 10*   MONOS PCT % 10   EOS PCT % 0     Results from last 7 days   Lab Units 01/20/20  0450 01/19/20  2344   SODIUM mmol/L 144 135*   POTASSIUM mmol/L 3 7 3 5   CHLORIDE mmol/L 111* 101   CO2 mmol/L 21 21   BUN mg/dL 15 18   CREATININE mg/dL 0 86 1 11   ANION GAP mmol/L 12 13   CALCIUM mg/dL 7 5* 8 5   ALBUMIN g/dL  --  3 6   TOTAL BILIRUBIN mg/dL  --  0 37   ALK PHOS U/L  --  70   ALT U/L  --  15   AST U/L  --  16   GLUCOSE RANDOM mg/dL 145* 178*     Results from last 7 days   Lab Units 01/19/20  2344   INR  1 06         Results from last 7 days   Lab Units 01/14/20  1139   HEMOGLOBIN A1C  6 1     Results from last 7 days   Lab Units 01/20/20  0450 01/20/20  0151 01/19/20  2344   LACTIC ACID mmol/L 1 4 2 8* 2 6*       Imaging: I have personally reviewed pertinent reports  and I have personally reviewed pertinent films in PACS    XR chest portable   ED Interpretation by Scott Whittington DO (01/19 2357)   No acute findings          EKG, Pathology, and Other Studies Reviewed on Admission:   EKG:  ST rate of 116 no ectopy or evidence of ischemia or infarct  Allscripts / Epic Records Reviewed: Yes     ** Please Note: This note has been constructed using a voice recognition system   **

## 2020-01-20 NOTE — ED NOTES
Pt resting comfortably in room at this time, call bell is within reach       Monique Ocampo, RN  01/19/20 8431

## 2020-01-20 NOTE — NURSING NOTE
Patient new admit to floor  She denies any SOB or pain  Ambulating with steady gait  Oriented to room and call bell  Only complaints is congestion  Urine and lactic obtained

## 2020-01-20 NOTE — RESPIRATORY THERAPY NOTE
Resp Care      01/20/20 0251   Respiratory Protocol   Protocol Initiated? Yes   Protocol Selection Respiratory   Language Barrier? No   Medical & Social History Reviewed? Yes   Diagnostic Studies Reviewed? Yes   Physical Assessment Performed? Yes   Respiratory Plan Discontinue Protocol   Respiratory Assessment   Assessment Type Assess only   General Appearance Awake; Alert   Respiratory Pattern Normal   Chest Assessment Chest expansion symmetrical   Bilateral Breath Sounds Clear   Cough None   Resp Comments Pt admitted to hospital with Influenza  Pt has no pulmonary hx nor does she take any home pulmonary meds  Pt currently 96% on RA  BS clear  pt has no resp complaints at this time  CXR done not read yet  Will d c protocol at this time but will reorder if needed for resp distress, sob, wheezing

## 2020-01-20 NOTE — PLAN OF CARE
Problem: Potential for Falls  Goal: Patient will remain free of falls  Description  INTERVENTIONS:  - Assess patient frequently for physical needs  -  Identify cognitive and physical deficits and behaviors that affect risk of falls    -  [place] fall precautions as indicated by assessment   - Educate patient/family on patient safety including physical limitations  - Instruct patient to call for assistance with activity based on assessment  - Modify environment to reduce risk of injury  - Consider OT/PT consult to assist with strengthening/mobility  Outcome: Progressing     Problem: PAIN - ADULT  Goal: Verbalizes/displays adequate comfort level or baseline comfort level  Description  Interventions:  - Encourage patient to monitor pain and request assistance  - Assess pain using appropriate pain scale  - Administer analgesics based on type and severity of pain and evaluate response  - Implement non-pharmacological measures as appropriate and evaluate response  - Consider cultural and social influences on pain and pain management  - Notify physician/advanced practitioner if interventions unsuccessful or patient reports new pain  Outcome: Progressing     Problem: INFECTION - ADULT  Goal: Absence or prevention of progression during hospitalization  Description  INTERVENTIONS:  - Assess and monitor for signs and symptoms of infection  - Monitor lab/diagnostic results  - Monitor all insertion sites, i e  indwelling lines, tubes, and drains  - Monitor endotracheal if appropriate and nasal secretions for changes in amount and color  - [place] appropriate cooling/warming therapies per order  - Administer medications as ordered  - Instruct and encourage patient and family to use good hand hygiene technique  - Identify and instruct in appropriate isolation precautions for identified infection/condition  Outcome: Progressing  Goal: Absence of fever/infection during neutropenic period  Description  INTERVENTIONS:  - Monitor WBC    Outcome: Progressing     Problem: SAFETY ADULT  Goal: Maintain or return to baseline ADL function  Description  INTERVENTIONS:  -  Assess patient's ability to carry out ADLs; assess patient's baseline for ADL function and identify physical deficits which impact ability to perform ADLs (bathing, care of mouth/teeth, toileting, grooming, dressing, etc )  - Assess/evaluate cause of self-care deficits   - Assess range of motion  - Assess patient's mobility; develop plan if impaired  - Assess patient's need for assistive devices and provide as appropriate  - Encourage maximum independence but intervene and supervise when necessary  - Involve family in performance of ADLs  - Assess for home care needs following discharge   - Consider OT consult to assist with ADL evaluation and planning for discharge  - Provide patient education as appropriate  Outcome: Progressing  Goal: Maintain or return mobility status to optimal level  Description  INTERVENTIONS:  - Assess patient's baseline mobility status (ambulation, transfers, stairs, etc )    - Identify cognitive and physical deficits and behaviors that affect mobility  - Identify mobility aids required to assist with transfers and/or ambulation (gait belt, sit-to-stand, lift, walker, cane, etc )  - [place] fall precautions as indicated by assessment  - Record patient progress and toleration of activity level on Mobility SBAR; progress patient to next Phase/Stage  - Instruct patient to call for assistance with activity based on assessment  - Consider rehabilitation consult to assist with strengthening/weightbearing, etc   Outcome: Progressing     Problem: DISCHARGE PLANNING  Goal: Discharge to home or other facility with appropriate resources  Description  INTERVENTIONS:  - Identify barriers to discharge w/patient and caregiver  - Arrange for needed discharge resources and transportation as appropriate  - Identify discharge learning needs (meds, wound care, etc )  - Arrange for interpretive services to assist at discharge as needed  - Refer to Case Management Department for coordinating discharge planning if the patient needs post-hospital services based on physician/advanced practitioner order or complex needs related to functional status, cognitive ability, or social support system  Outcome: Progressing     Problem: Knowledge Deficit  Goal: Patient/family/caregiver demonstrates understanding of disease process, treatment plan, medications, and discharge instructions  Description  Complete learning assessment and assess knowledge base    Interventions:  - Provide teaching at level of understanding  - Provide teaching via preferred learning methods  Outcome: Progressing

## 2020-01-20 NOTE — ASSESSMENT & PLAN NOTE
Lab Results   Component Value Date    HGBA1C 6 1 01/14/2020       No results for input(s): POCGLU in the last 72 hours      Blood Sugar Average: Last 72 hrs:     · Hold home Glucophage  · Fingerstick blood sugars with sliding scale coverage  · Diabetic diet

## 2020-01-20 NOTE — ASSESSMENT & PLAN NOTE
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

## 2020-01-20 NOTE — ED PROVIDER NOTES
History  Chief Complaint   Patient presents with    Cough     Pt arrives via EMS, cough/congestion/fever/body aches since friday  Denies n/v/d  Patient is a 62-year-old female brought to the emergency room by EMS for complaints of 3 day history of fever, body aches, cough cold and congestion, patient's pulse ox 93% on EMS arrival, she did receive a nebulized breathing treatment in route and now pulse ox 98-99%, she also had a temperature of 102 9°, received Tylenol 650 mg EN route, patient denies any recent sick contacts, no recent traveling, reports she did get a flu vaccination this year          Prior to Admission Medications   Prescriptions Last Dose Informant Patient Reported? Taking?    ASPIRIN 81 PO   Yes No   Sig: Take 81 mg by mouth daily   Multiple Vitamins-Minerals (MULTIVITAMIN WITH MINERALS) tablet   Yes No   Sig: Take 1 tablet by mouth daily   amLODIPine (NORVASC) 2 5 mg tablet   No No   Sig: Take 2 tablets (5 mg total) by mouth daily   atorvastatin (LIPITOR) 20 mg tablet   No No   Sig: TAKE 1 TABLET AT BEDTIME   diclofenac sodium (VOLTAREN) 1 %   Yes No   Sig: Apply 1 application topically Three times daily as needed   escitalopram (LEXAPRO) 20 mg tablet   Yes No   Sig: Take 20 mg by mouth daily   estrogen, conjugated,-medroxyprogesterone (PREMPRO) 0 625-5 MG per tablet   No No   Sig: Take 1 tablet by mouth daily   lansoprazole (PREVACID) 15 mg capsule   Yes No   Sig: Take 15 mg by mouth daily   levETIRAcetam (KEPPRA) 750 mg tablet   Yes No   Sig: Take 2,250 mg by mouth 2 (two) times a day   metFORMIN (GLUCOPHAGE-XR) 500 mg 24 hr tablet   No No   Sig: Take 1 tablet (500 mg total) by mouth daily with breakfast   topiramate (TOPAMAX) 100 mg tablet   Yes No   Sig: Take 100 mg by mouth 2 (two) times a day   topiramate (TOPAMAX) 200 MG tablet   Yes No   Sig: Take 200 mg by mouth 2 (two) times a day   traZODone (DESYREL) 100 mg tablet   Yes No   Sig: Take 100 mg by mouth daily at bedtime   venlafaxine (EFFEXOR-XR) 75 mg 24 hr capsule   Yes No   Sig: Take 75 mg by mouth daily      Facility-Administered Medications: None       Past Medical History:   Diagnosis Date    Cervicalgia     Depression     Diabetes mellitus (HCC)     Esophageal reflux     Hyperlipidemia     Hypertension     Essential    Migraine     Nonspecific abnormal electrocardiogram (ECG)     Sciatica     Seizure disorder (HCC)     Sjogren's syndrome (HCC)        Past Surgical History:   Procedure Laterality Date    CERVICAL POLYPECTOMY      CRANIOTOMY       Left frontal craniotomy for resection of tumor-Dr [address]     TUBAL LIGATION         Family History   Problem Relation Age of Onset    Heart disease Brother     Heart disease Father     Diabetes Maternal Aunt     Diabetes Maternal Grandmother     Aneurysm Mother     Lupus Sister     Thyroid cancer Sister         age unknown    No Known Problems Sister     No Known Problems Sister     No Known Problems Maternal Aunt     No Known Problems Maternal Aunt     No Known Problems Paternal Aunt      I have reviewed and agree with the history as documented  Social History     Tobacco Use    Smoking status: Never Smoker    Smokeless tobacco: Never Used   Substance Use Topics    Alcohol use: Never     Frequency: Never    Drug use: Never        Review of Systems   Constitutional: Positive for chills, fatigue and fever  HENT: Positive for congestion  Eyes: Negative  Respiratory: Positive for cough  Cardiovascular: Negative  Gastrointestinal: Negative  Endocrine: Negative  Genitourinary: Negative  Musculoskeletal: Positive for myalgias  Skin: Negative  Allergic/Immunologic: Negative  Neurological: Negative  Hematological: Negative  Psychiatric/Behavioral: Negative  Physical Exam  Physical Exam   Constitutional: She is oriented to person, place, and time  She appears well-developed  She is active   She appears ill    HENT:   Head: Normocephalic and atraumatic  Eyes: Pupils are equal, round, and reactive to light  Conjunctivae, EOM and lids are normal    Neck: Trachea normal and normal range of motion  Neck supple  Cardiovascular: Regular rhythm  Tachycardia present  Pulmonary/Chest: Effort normal and breath sounds normal    Abdominal: Soft  Normal appearance and bowel sounds are normal    Musculoskeletal: Normal range of motion  Neurological: She is alert and oriented to person, place, and time  She has normal strength  No cranial nerve deficit or sensory deficit  Skin: Skin is warm and dry  Capillary refill takes less than 2 seconds  Psychiatric: She has a normal mood and affect   Her behavior is normal  Thought content normal        Vital Signs  ED Triage Vitals [01/19/20 2343]   Temperature Pulse Respirations Blood Pressure SpO2   98 8 °F (37 1 °C) (!) 117 20 135/75 92 %      Temp Source Heart Rate Source Patient Position - Orthostatic VS BP Location FiO2 (%)   Oral Monitor Sitting Right arm --      Pain Score       --           Vitals:    01/19/20 2343 01/20/20 0042   BP: 135/75 135/62   Pulse: (!) 117 (!) 109   Patient Position - Orthostatic VS: Sitting Lying               ED Medications  Medications   sodium chloride (PF) 0 9 % injection 3 mL (has no administration in time range)   sodium chloride 0 9 % bolus 1,000 mL (0 mL Intravenous Stopped 1/20/20 0044)     Followed by   sodium chloride 0 9 % bolus 1,000 mL (1,000 mL Intravenous New Bag 1/20/20 0021)     Followed by   sodium chloride 0 9 % bolus 1,000 mL (has no administration in time range)   ipratropium-albuterol (FOR EMS ONLY) (DUO-NEB) 0 5-2 5 mg/3 mL inhalation solution 6 mL (0 mL Does not apply Given to EMS 1/20/20 0007)   acetaminophen (FOR EMS ONLY) (TYLENOL) oral suspension 650 mg (0 mg Does not apply Given to EMS 1/20/20 0007)   acetaminophen (TYLENOL) tablet 650 mg (650 mg Oral Given 1/20/20 0046)       Diagnostic Studies  Results Reviewed Procedure Component Value Units Date/Time    Influenza A/B and RSV PCR [[id number]]  (Abnormal) Collected:  01/19/20 2344    Lab Status:  Final result Specimen:  Nasopharyngeal Swab Updated:  01/20/20 0030     INFLUENZA A PCR Detected     INFLUENZA B PCR None Detected     RSV PCR None Detected    Lactic acid x2 [[id number]]  (Abnormal) Collected:  01/19/20 2344    Lab Status:  Final result Specimen:  Blood from Arm, Left Updated:  01/20/20 0017     LACTIC ACID 2 6 mmol/L     Narrative:       Result may be elevated if tourniquet was used during collection      Comprehensive metabolic panel [[id number]]  (Abnormal) Collected:  01/19/20 2344    Lab Status:  Final result Specimen:  Blood from Arm, Left Updated:  01/20/20 0011     Sodium 135 mmol/L      Potassium 3 5 mmol/L      Chloride 101 mmol/L      CO2 21 mmol/L      ANION GAP 13 mmol/L      BUN 18 mg/dL      Creatinine 1 11 mg/dL      Glucose 178 mg/dL      Calcium 8 5 mg/dL      AST 16 U/L      ALT 15 U/L      Alkaline Phosphatase 70 U/L      Total Protein 8 2 g/dL      Albumin 3 6 g/dL      Total Bilirubin 0 37 mg/dL      eGFR 52 ml/min/1 73sq m     Narrative:       [place] guidelines for Chronic Kidney Disease (CKD):     Stage 1 with normal or high GFR (GFR > 90 mL/min/1 73 square meters)    Stage 2 Mild CKD (GFR = 60-89 mL/min/1 73 square meters)    Stage 3A Moderate CKD (GFR = 45-59 mL/min/1 73 square meters)    Stage 3B Moderate CKD (GFR = 30-44 mL/min/1 73 square meters)    Stage 4 Severe CKD (GFR = 15-29 mL/min/1 73 square meters)    Stage 5 End Stage CKD (GFR <15 mL/min/1 73 square meters)  Note: GFR calculation is accurate only with a steady state creatinine    Protime-INR [[id number]]  (Normal) Collected:  01/19/20 2344    Lab Status:  Final result Specimen:  Blood from Arm, Left Updated:  01/20/20 0005     Protime 13 8 seconds      INR 1 06    APTT [[id number]]  (Normal) Collected:  01/19/20 2344    Lab Status:  Final result Specimen:  Blood from Arm, Left Updated:  01/20/20 0005     PTT 24 seconds     CBC and differential [[id number]]  (Abnormal) Collected:  01/19/20 2344    Lab Status:  Final result Specimen:  Blood from Arm, Left Updated:  01/19/20 2351     WBC 11 21 Thousand/uL      RBC 4 33 Million/uL      Hemoglobin 13 6 g/dL      Hematocrit 40 4 %      MCV 93 fL      MCH 31 4 pg      MCHC 33 7 g/dL      RDW 12 3 %      MPV 9 9 fL      Platelets 395 Thousands/uL      nRBC 0 /100 WBCs      Neutrophils Relative 84 %      Immat GRANS % 0 %      Lymphocytes Relative 7 %      Monocytes Relative 9 %      Eosinophils Relative 0 %      Basophils Relative 0 %      Neutrophils Absolute 9 27 Thousands/µL      Immature Grans Absolute 0 03 Thousand/uL      Lymphocytes Absolute 0 82 Thousands/µL      Monocytes Absolute 1 03 Thousand/µL      Eosinophils Absolute 0 02 Thousand/µL      Basophils Absolute 0 04 Thousands/µL     Procalcitonin [[id number]] Collected:  01/19/20 2344    Lab Status: In process Specimen:  Blood from Arm, Left Updated:  01/19/20 2349    Blood culture #1 [[id number]] Collected:  01/19/20 2345    Lab Status: In process Specimen:  Blood from Arm, Right Updated:  01/19/20 2349    Blood culture #2 [[id number]] Collected:  01/19/20 2344    Lab Status:   In process Specimen:  Blood from Arm, Left Updated:  01/19/20 2349    Lactic acid x2 [[id number]]     Lab Status:  No result Specimen:  Blood     UA w Reflex to Microscopic w Reflex to Culture [[id number]]     Lab Status:  No result Specimen:  Urine                  XR chest portable   ED Interpretation by Penelope De Paz DO (01/19 2357)   No acute findings                 Procedures  ECG 12 Lead Documentation Only  Date/Time: 1/19/2020 11:51 PM  Performed by: Penelope De Paz DO  Authorized by: Penelope De Paz DO     Indications / Diagnosis:  Tachycardia  ECG reviewed by me, the ED Provider: yes    Patient location:  ED  Previous ECG:     Comparison to cardiac monitor: Yes Interpretation:     Interpretation: abnormal    Rate:     ECG rate:  116    ECG rate assessment: tachycardic    Rhythm:     Rhythm: sinus tachycardia    Ectopy:     Ectopy: none    QRS:     QRS axis:  Normal    QRS intervals:  Normal  Conduction:     Conduction: normal    ST segments:     ST segments:  Normal  T waves:     T waves: normal               ED Course  ED Course as of Jan 20 0050 Mon Jan 20, 2020   0039 Patient with symptoms consistent with influenza, influenza A positive on swab, mild leukocytosis and mild lactic acidosis, technically with patient's vital signs she meets criteria for sepsis and Sepsis Alert was called by nursing staff, however given the positive influenza swab antibiotics have not been ordered as I feel they are not necessary in this case, patient is getting IV fluids consistent with 30 mL per kg, otherwise discussed with hospitalist service who agrees to admit for further evaluation and treatment                                      Disposition  Final diagnoses:   Influenza A   Lactic acid acidosis     Time reflects when diagnosis was documented in both MDM as applicable and the Disposition within this note     Time User Action Codes Description Comment    1/20/2020 12:41 AM Mabel Suggs Add [J10 1] Influenza A     1/20/2020 12:41 AM Mabel Suggs Add [E87 2] Lactic acid acidosis       ED Disposition     ED Disposition Condition Date/Time Comment    Admit Stable Mon Jan 20, 2020 12:43 AM Case was discussed with MARY ANN Bhat Friday and the patient's admission status was agreed to be Admission Status: inpatient status to the service of Dr Renea Miranda   Follow-up Information    None         Patient's Medications   Discharge Prescriptions    No medications on file     No discharge procedures on file      ED Provider  Electronically Signed by           Stephanie Bermudez DO  01/20/20 5242

## 2020-01-20 NOTE — ASSESSMENT & PLAN NOTE
· POA fever, tachycardia, suspected infection, lactic acid 2 4  · Influenza A positive  · Received fluid bolus in the ER  · Antibiotics not started in the ER due influenza being likely source of SIRS and lactic acidosis  · Chest x-ray: No focal consolidation   Radiology read pending   · WBC: 11  · Will start Tamiflu  · Check procalcitonin  · Will hold off on antibiotics at this point  · Normal saline at 75 an hour  · Trend lactate until normalizes

## 2020-01-21 ENCOUNTER — TRANSITIONAL CARE MANAGEMENT (OUTPATIENT)
Dept: FAMILY MEDICINE CLINIC | Facility: CLINIC | Age: 70
End: 2020-01-21

## 2020-01-21 LAB
ATRIAL RATE: 116 BPM
GLUCOSE SERPL-MCNC: 129 MG/DL (ref 65–140)
GLUCOSE SERPL-MCNC: 142 MG/DL (ref 65–140)
GLUCOSE SERPL-MCNC: 81 MG/DL (ref 65–140)
GLUCOSE SERPL-MCNC: 87 MG/DL (ref 65–140)
P AXIS: 64 DEGREES
PR INTERVAL: 138 MS
QRS AXIS: 14 DEGREES
QRSD INTERVAL: 78 MS
QT INTERVAL: 324 MS
QTC INTERVAL: 450 MS
T WAVE AXIS: 16 DEGREES
TOPIRAMATE SERPL-MCNC: 12.6 UG/ML (ref 2–25)
VENTRICULAR RATE: 116 BPM

## 2020-01-21 PROCEDURE — 82948 REAGENT STRIP/BLOOD GLUCOSE: CPT

## 2020-01-21 PROCEDURE — 99232 SBSQ HOSP IP/OBS MODERATE 35: CPT | Performed by: FAMILY MEDICINE

## 2020-01-21 PROCEDURE — 93010 ELECTROCARDIOGRAM REPORT: CPT | Performed by: INTERNAL MEDICINE

## 2020-01-21 RX ADMIN — TOPIRAMATE 200 MG: 100 TABLET, FILM COATED ORAL at 08:50

## 2020-01-21 RX ADMIN — Medication 1 TABLET: at 08:50

## 2020-01-21 RX ADMIN — ASPIRIN 81 MG 81 MG: 81 TABLET ORAL at 08:50

## 2020-01-21 RX ADMIN — LEVETIRACETAM 750 MG: 250 TABLET, FILM COATED ORAL at 08:50

## 2020-01-21 RX ADMIN — ATORVASTATIN CALCIUM 20 MG: 20 TABLET, FILM COATED ORAL at 22:11

## 2020-01-21 RX ADMIN — TOPIRAMATE 200 MG: 100 TABLET, FILM COATED ORAL at 19:59

## 2020-01-21 RX ADMIN — OSELTAMIVIR PHOSPHATE 30 MG: 30 CAPSULE ORAL at 08:51

## 2020-01-21 RX ADMIN — ENOXAPARIN SODIUM 40 MG: 40 INJECTION SUBCUTANEOUS at 08:51

## 2020-01-21 RX ADMIN — AMLODIPINE BESYLATE 5 MG: 5 TABLET ORAL at 08:51

## 2020-01-21 RX ADMIN — TRAZODONE HYDROCHLORIDE 100 MG: 50 TABLET ORAL at 22:11

## 2020-01-21 RX ADMIN — LEVETIRACETAM 750 MG: 250 TABLET, FILM COATED ORAL at 19:59

## 2020-01-21 RX ADMIN — VENLAFAXINE HYDROCHLORIDE 75 MG: 37.5 CAPSULE, EXTENDED RELEASE ORAL at 08:50

## 2020-01-21 RX ADMIN — PANTOPRAZOLE SODIUM 20 MG: 20 TABLET, DELAYED RELEASE ORAL at 05:05

## 2020-01-21 RX ADMIN — ESCITALOPRAM OXALATE 20 MG: 10 TABLET ORAL at 08:50

## 2020-01-21 RX ADMIN — OSELTAMIVIR PHOSPHATE 30 MG: 30 CAPSULE ORAL at 22:11

## 2020-01-21 NOTE — ASSESSMENT & PLAN NOTE
· 1/21/20  · Condition is improved  · Secondary to influenza A  · Continue current treatment and management  ·   · 1/20/20  · POA fever, tachycardia, suspected infection, lactic acid 2 4  · Influenza A positive  · Received fluid bolus in the ER  · Antibiotics not started in the ER due influenza being likely source of SIRS and lactic acidosis  · Chest x-ray: No focal consolidation   Radiology read pending   · WBC: 11  · Will start Tamiflu  · Check procalcitonin  · Will hold off on antibiotics at this point  · Normal saline at 75 an hour  · Trend lactate until normalizes

## 2020-01-21 NOTE — PLAN OF CARE
Problem: Potential for Falls  Goal: Patient will remain free of falls  Description  INTERVENTIONS:  - Assess patient frequently for physical needs  -  Identify cognitive and physical deficits and behaviors that affect risk of falls    -  [place] fall precautions as indicated by assessment   - Educate patient/family on patient safety including physical limitations  - Instruct patient to call for assistance with activity based on assessment  - Modify environment to reduce risk of injury  - Consider OT/PT consult to assist with strengthening/mobility  Outcome: Progressing     Problem: PAIN - ADULT  Goal: Verbalizes/displays adequate comfort level or baseline comfort level  Description  Interventions:  - Encourage patient to monitor pain and request assistance  - Assess pain using appropriate pain scale  - Administer analgesics based on type and severity of pain and evaluate response  - Implement non-pharmacological measures as appropriate and evaluate response  - Consider cultural and social influences on pain and pain management  - Notify physician/advanced practitioner if interventions unsuccessful or patient reports new pain  Outcome: Progressing     Problem: INFECTION - ADULT  Goal: Absence or prevention of progression during hospitalization  Description  INTERVENTIONS:  - Assess and monitor for signs and symptoms of infection  - Monitor lab/diagnostic results  - Monitor all insertion sites, i e  indwelling lines, tubes, and drains  - Monitor endotracheal if appropriate and nasal secretions for changes in amount and color  - [place] appropriate cooling/warming therapies per order  - Administer medications as ordered  - Instruct and encourage patient and family to use good hand hygiene technique  - Identify and instruct in appropriate isolation precautions for identified infection/condition  Outcome: Progressing  Goal: Absence of fever/infection during neutropenic period  Description  INTERVENTIONS:  - Monitor WBC    Outcome: Progressing     Problem: SAFETY ADULT  Goal: Maintain or return to baseline ADL function  Description  INTERVENTIONS:  -  Assess patient's ability to carry out ADLs; assess patient's baseline for ADL function and identify physical deficits which impact ability to perform ADLs (bathing, care of mouth/teeth, toileting, grooming, dressing, etc )  - Assess/evaluate cause of self-care deficits   - Assess range of motion  - Assess patient's mobility; develop plan if impaired  - Assess patient's need for assistive devices and provide as appropriate  - Encourage maximum independence but intervene and supervise when necessary  - Involve family in performance of ADLs  - Assess for home care needs following discharge   - Consider OT consult to assist with ADL evaluation and planning for discharge  - Provide patient education as appropriate  Outcome: Progressing  Goal: Maintain or return mobility status to optimal level  Description  INTERVENTIONS:  - Assess patient's baseline mobility status (ambulation, transfers, stairs, etc )    - Identify cognitive and physical deficits and behaviors that affect mobility  - Identify mobility aids required to assist with transfers and/or ambulation (gait belt, sit-to-stand, lift, walker, cane, etc )  - [place] fall precautions as indicated by assessment  - Record patient progress and toleration of activity level on Mobility SBAR; progress patient to next Phase/Stage  - Instruct patient to call for assistance with activity based on assessment  - Consider rehabilitation consult to assist with strengthening/weightbearing, etc   Outcome: Progressing     Problem: DISCHARGE PLANNING  Goal: Discharge to home or other facility with appropriate resources  Description  INTERVENTIONS:  - Identify barriers to discharge w/patient and caregiver  - Arrange for needed discharge resources and transportation as appropriate  - Identify discharge learning needs (meds, wound care, etc )  - Arrange for interpretive services to assist at discharge as needed  - Refer to Case Management Department for coordinating discharge planning if the patient needs post-hospital services based on physician/advanced practitioner order or complex needs related to functional status, cognitive ability, or social support system  Outcome: Progressing     Problem: Knowledge Deficit  Goal: Patient/family/caregiver demonstrates understanding of disease process, treatment plan, medications, and discharge instructions  Description  Complete learning assessment and assess knowledge base    Interventions:  - Provide teaching at level of understanding  - Provide teaching via preferred learning methods  Outcome: Progressing

## 2020-01-21 NOTE — ASSESSMENT & PLAN NOTE
· 1/21/20  · Control  · Continue current treatment and management  ·   · 1/20/20  · BP reviewed and acceptable  · Continue amlodipine  · Monitor blood pressures

## 2020-01-21 NOTE — H&P
Patient seen and examined  Discussed with the advance provider and agree with the assessment and plan as documented  Patient came to the hospital secondary fever and body aches  Patient met the criteria for severe sepsis at the time of presentation    Her sepsis related to flu  Currently patient is on Tamiflu  Symptomatically improving  Monitor in in the hospital overnight  If remained stable can be discharged home tomorrow with outpatient follow-up with the primary care physician

## 2020-01-21 NOTE — ASSESSMENT & PLAN NOTE
Lab Results   Component Value Date    HGBA1C 6 6 (H) 01/20/2020       Recent Labs     01/20/20  0725 01/20/20  1058 01/20/20  1601   POCGLU 110 98 95       Blood Sugar Average: Last 72 hrs:  (P) 101   Continue current treatment and management  · Hold home Glucophage  · Fingerstick blood sugars with sliding scale coverage  · Diabetic diet

## 2020-01-21 NOTE — PROGRESS NOTES
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 at [facility]  · No current seizure activity  · Continue Keppra and Topamax  · Check levels    Hypertension  Assessment & Plan  · 1/21/20  · Control  · Continue current treatment and management  ·   · 1/20/20  · BP reviewed and acceptable  · Continue amlodipine  · Monitor blood pressures    Type 2 diabetes mellitus with hyperglycemia, without long-term current use of insulin [facility])  Assessment & Plan  Lab Results   Component Value Date    HGBA1C 6 6 (H) 01/20/2020       Recent Labs     01/20/20  0725 01/20/20  1058 01/20/20  1601   POCGLU 110 98 95       Blood Sugar Average: Last 72 hrs:  (P) 101   Continue current treatment and management  · Hold home Glucophage  · Fingerstick blood sugars with sliding scale coverage  · Diabetic diet    * Severe sepsis (HCC)  Assessment & Plan  · 1/21/20  · Condition is improved  · Secondary to influenza A  · Continue current treatment and management  ·   · 1/20/20  · POA fever, tachycardia, suspected infection, lactic acid 2 4  · Influenza A positive  · Received fluid bolus in the ER  · Antibiotics not started in the ER due influenza being likely source of SIRS and lactic acidosis  · Chest x-ray: No focal consolidation   Radiology read pending   · WBC: 11  · Will start Tamiflu  · Check procalcitonin  · Will hold off on antibiotics at this point  · Normal saline at 75 an hour  · Trend lactate until normalizes        VTE Pharmacologic Prophylaxis:   Pharmacologic: Enoxaparin (Lovenox)  Mechanical VTE Prophylaxis in Place: Yes    Patient Centered Rounds: I have performed bedside rounds with nursing staff today  Time Spent for Care: 30 minutes  More than 50% of total time spent on counseling and coordination of care as described above  Current Length of Stay: 1 day(s)    Current Patient Status: Inpatient   Certification Statement: The patient will continue to require additional inpatient hospital stay due to Sepsis secondary to influenza a    Discharge Plan / Estimated Discharge Date: 20      Code Status: Level 1 - Full Code      Subjective:   Seen and evaluated during the round  Denies any chest pain, fever, body ache  But reports she is still coughing mainly dry in nature, denies any blood  Objective:     Vitals:   Temp (24hrs), Av 3 °F (37 4 °C), Min:98 6 °F (37 °C), Max:100 °F (37 8 °C)    Temp:  [98 6 °F (37 °C)-100 °F (37 8 °C)] 98 6 °F (37 °C)  HR:  [81-82] 81  Resp:  [16-20] 16  BP: (131-135)/(66-68) 131/68  SpO2:  [92 %-93 %] 92 %  Body mass index is 32 17 kg/m²  Input and Output Summary (last 24 hours): Intake/Output Summary (Last 24 hours) at 2020 1643  Last data filed at 2020 1601  Gross per 24 hour   Intake 1592 5 ml   Output 3000 ml   Net -1407 5 ml       Physical Exam:     Physical Exam   Constitutional: She is oriented to person, place, and time  She appears well-developed  No distress  HENT:   Mouth/Throat: Oropharynx is clear and moist  No oropharyngeal exudate  Eyes: Pupils are equal, round, and reactive to light  Conjunctivae and EOM are normal  No scleral icterus  Neck: Normal range of motion  Neck supple  No JVD present  No tracheal deviation present  Cardiovascular: Normal rate and regular rhythm  Exam reveals no gallop and no friction rub  No murmur heard  Pulmonary/Chest: Effort normal and breath sounds normal  No respiratory distress  She has no wheezes  She has no rales  Abdominal: Soft  Bowel sounds are normal  She exhibits no mass  There is no tenderness  There is no rebound and no guarding  Musculoskeletal: Normal range of motion  She exhibits no edema  Lymphadenopathy:     She has no cervical adenopathy  Neurological: She is alert and oriented to person, place, and time  She displays normal reflexes  No cranial nerve deficit  Coordination normal    Skin: Skin is warm  Capillary refill takes less than 2 seconds  Psychiatric: She has a normal mood and affect  Nursing note and vitals reviewed  Additional Data:     Labs:    Results from last 7 days   Lab Units 01/20/20  0511   WBC Thousand/uL 6 47   HEMOGLOBIN g/dL 11 7   HEMATOCRIT % 35 9   PLATELETS Thousands/uL 163   NEUTROS PCT % 80*   LYMPHS PCT % 10*   MONOS PCT % 10   EOS PCT % 0     Results from last 7 days   Lab Units 01/20/20  0450 01/19/20  2344   POTASSIUM mmol/L 3 7 3 5   CHLORIDE mmol/L 111* 101   CO2 mmol/L 21 21   BUN mg/dL 15 18   CREATININE mg/dL 0 86 1 11   CALCIUM mg/dL 7 5* 8 5   ALK PHOS U/L  --  70   ALT U/L  --  15   AST U/L  --  16     Results from last 7 days   Lab Units 01/19/20  2344   INR  1 06       * I Have Reviewed All Lab Data Listed Above  * Additional Pertinent Lab Tests Reviewed: All Labs Within Last 24 Hours Reviewed      Recent Cultures (last 7 days):     Results from last 7 days   Lab Units 01/19/20  2345 01/19/20  2344   BLOOD CULTURE  No Growth at 24 hrs  No Growth at 24 hrs         Last 24 Hours Medication List:     Current Facility-Administered Medications:  acetaminophen 650 mg Oral Q6H PRN JENI Nguyen   amLODIPine 5 mg Oral Daily JENI Nguyen   aspirin 81 mg Oral Daily JENI Nguyen   atorvastatin 20 mg Oral HS JENI Nguyen   dextromethorphan-guaiFENesin 10 mL Oral Q4H PRN JENI Nguyen   enoxaparin 40 mg Subcutaneous Q24H [facility] JENI Nguyen   escitalopram 20 mg Oral Daily JENI Nguyen   insulin lispro 1-5 Units Subcutaneous TID AC JENI Nguyen   insulin lispro 1-5 Units Subcutaneous HS JENI Nguyen   levETIRAcetam 750 mg Oral BID Sierra Wood MD   multivitamin-minerals 1 tablet Oral Daily JENI Singh   oseltamivir 30 mg Oral Q12H [address] Hankins, JENI   pantoprazole 20 mg Oral Early Morning JENI Singh   sodium chloride (PF) 3 mL Intravenous PRN Macy Limon DO   topiramate 200 mg Oral BID JENI Singh   traZODone 100 mg Oral HS JENI Singh   venlafaxine 75 mg Oral Daily JENI Singh        Today, Patient Was Seen By: Richard Spicer MD    ** Please Note: Dragon 360 Dictation voice to text software may have been used in the creation of this document   **

## 2020-01-21 NOTE — PLAN OF CARE
Problem: Potential for Falls  Goal: Patient will remain free of falls  Description  INTERVENTIONS:  - Assess patient frequently for physical needs  -  Identify cognitive and physical deficits and behaviors that affect risk of falls    -  [place] fall precautions as indicated by assessment   - Educate patient/family on patient safety including physical limitations  - Instruct patient to call for assistance with activity based on assessment  - Modify environment to reduce risk of injury  - Consider OT/PT consult to assist with strengthening/mobility  Outcome: Progressing     Problem: PAIN - ADULT  Goal: Verbalizes/displays adequate comfort level or baseline comfort level  Description  Interventions:  - Encourage patient to monitor pain and request assistance  - Assess pain using appropriate pain scale  - Administer analgesics based on type and severity of pain and evaluate response  - Implement non-pharmacological measures as appropriate and evaluate response  - Consider cultural and social influences on pain and pain management  - Notify physician/advanced practitioner if interventions unsuccessful or patient reports new pain  Outcome: Progressing     Problem: INFECTION - ADULT  Goal: Absence or prevention of progression during hospitalization  Description  INTERVENTIONS:  - Assess and monitor for signs and symptoms of infection  - Monitor lab/diagnostic results  - Monitor all insertion sites, i e  indwelling lines, tubes, and drains  - Monitor endotracheal if appropriate and nasal secretions for changes in amount and color  - [place] appropriate cooling/warming therapies per order  - Administer medications as ordered  - Instruct and encourage patient and family to use good hand hygiene technique  - Identify and instruct in appropriate isolation precautions for identified infection/condition  Outcome: Progressing  Goal: Absence of fever/infection during neutropenic period  Description  INTERVENTIONS:  - Monitor WBC    Outcome: Progressing     Problem: SAFETY ADULT  Goal: Maintain or return to baseline ADL function  Description  INTERVENTIONS:  -  Assess patient's ability to carry out ADLs; assess patient's baseline for ADL function and identify physical deficits which impact ability to perform ADLs (bathing, care of mouth/teeth, toileting, grooming, dressing, etc )  - Assess/evaluate cause of self-care deficits   - Assess range of motion  - Assess patient's mobility; develop plan if impaired  - Assess patient's need for assistive devices and provide as appropriate  - Encourage maximum independence but intervene and supervise when necessary  - Involve family in performance of ADLs  - Assess for home care needs following discharge   - Consider OT consult to assist with ADL evaluation and planning for discharge  - Provide patient education as appropriate  Outcome: Progressing  Goal: Maintain or return mobility status to optimal level  Description  INTERVENTIONS:  - Assess patient's baseline mobility status (ambulation, transfers, stairs, etc )    - Identify cognitive and physical deficits and behaviors that affect mobility  - Identify mobility aids required to assist with transfers and/or ambulation (gait belt, sit-to-stand, lift, walker, cane, etc )  - [place] fall precautions as indicated by assessment  - Record patient progress and toleration of activity level on Mobility SBAR; progress patient to next Phase/Stage  - Instruct patient to call for assistance with activity based on assessment  - Consider rehabilitation consult to assist with strengthening/weightbearing, etc   Outcome: Progressing     Problem: DISCHARGE PLANNING  Goal: Discharge to home or other facility with appropriate resources  Description  INTERVENTIONS:  - Identify barriers to discharge w/patient and caregiver  - Arrange for needed discharge resources and transportation as appropriate  - Identify discharge learning needs (meds, wound care, etc )  - Arrange for interpretive services to assist at discharge as needed  - Refer to Case Management Department for coordinating discharge planning if the patient needs post-hospital services based on physician/advanced practitioner order or complex needs related to functional status, cognitive ability, or social support system  Outcome: Progressing     Problem: Knowledge Deficit  Goal: Patient/family/caregiver demonstrates understanding of disease process, treatment plan, medications, and discharge instructions  Description  Complete learning assessment and assess knowledge base    Interventions:  - Provide teaching at level of understanding  - Provide teaching via preferred learning methods  Outcome: Progressing

## 2020-01-21 NOTE — ASSESSMENT & PLAN NOTE
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

## 2020-01-22 ENCOUNTER — APPOINTMENT (OUTPATIENT)
Dept: PHYSICAL THERAPY | Facility: CLINIC | Age: 70
End: 2020-01-22
Payer: MEDICARE

## 2020-01-22 VITALS
BODY MASS INDEX: 32.17 KG/M2 | OXYGEN SATURATION: 97 % | DIASTOLIC BLOOD PRESSURE: 66 MMHG | HEART RATE: 74 BPM | TEMPERATURE: 97.5 F | WEIGHT: 187.39 LBS | RESPIRATION RATE: 18 BRPM | SYSTOLIC BLOOD PRESSURE: 139 MMHG

## 2020-01-22 LAB
GLUCOSE SERPL-MCNC: 100 MG/DL (ref 65–140)
GLUCOSE SERPL-MCNC: 91 MG/DL (ref 65–140)
LEVETIRACETAM SERPL-MCNC: 6.1 UG/ML (ref 10–40)

## 2020-01-22 PROCEDURE — 82948 REAGENT STRIP/BLOOD GLUCOSE: CPT

## 2020-01-22 PROCEDURE — 99239 HOSP IP/OBS DSCHRG MGMT >30: CPT | Performed by: FAMILY MEDICINE

## 2020-01-22 RX ORDER — GUAIFENESIN/DEXTROMETHORPHAN 100-10MG/5
10 SYRUP ORAL EVERY 4 HOURS PRN
Qty: 118 ML | Refills: 0 | Status: SHIPPED | OUTPATIENT
Start: 2020-01-22 | End: 2020-04-14

## 2020-01-22 RX ORDER — OSELTAMIVIR PHOSPHATE 30 MG/1
30 CAPSULE ORAL EVERY 12 HOURS SCHEDULED
Qty: 6 CAPSULE | Refills: 0 | Status: SHIPPED | OUTPATIENT
Start: 2020-01-22 | End: 2020-01-25

## 2020-01-22 RX ORDER — LEVETIRACETAM 750 MG/1
750 TABLET ORAL 2 TIMES DAILY
Qty: 60 TABLET | Refills: 0 | Status: SHIPPED | OUTPATIENT
Start: 2020-01-22 | End: 2021-05-03

## 2020-01-22 RX ORDER — ACETAMINOPHEN 325 MG/1
650 TABLET ORAL EVERY 6 HOURS PRN
Qty: 30 TABLET | Refills: 0 | Status: SHIPPED | OUTPATIENT
Start: 2020-01-22 | End: 2020-07-15

## 2020-01-22 RX ADMIN — TOPIRAMATE 200 MG: 100 TABLET, FILM COATED ORAL at 08:41

## 2020-01-22 RX ADMIN — AMLODIPINE BESYLATE 5 MG: 5 TABLET ORAL at 08:40

## 2020-01-22 RX ADMIN — OSELTAMIVIR PHOSPHATE 30 MG: 30 CAPSULE ORAL at 08:40

## 2020-01-22 RX ADMIN — VENLAFAXINE HYDROCHLORIDE 75 MG: 37.5 CAPSULE, EXTENDED RELEASE ORAL at 08:41

## 2020-01-22 RX ADMIN — PANTOPRAZOLE SODIUM 20 MG: 20 TABLET, DELAYED RELEASE ORAL at 05:23

## 2020-01-22 RX ADMIN — LEVETIRACETAM 750 MG: 250 TABLET, FILM COATED ORAL at 08:40

## 2020-01-22 RX ADMIN — ESCITALOPRAM OXALATE 20 MG: 10 TABLET ORAL at 08:40

## 2020-01-22 RX ADMIN — Medication 1 TABLET: at 08:40

## 2020-01-22 RX ADMIN — ENOXAPARIN SODIUM 40 MG: 40 INJECTION SUBCUTANEOUS at 08:41

## 2020-01-22 RX ADMIN — ASPIRIN 81 MG 81 MG: 81 TABLET ORAL at 08:41

## 2020-01-22 NOTE — ASSESSMENT & PLAN NOTE
· 1/22/20  · Condition improved  · Secondary to influenza A  · Patient received oral Tamiflu for last 2 days-needs to complete 3 more days  · Plan is to discharge patient home  ·   · 1/21/20  · Condition is improved  · Secondary to influenza A  · Continue current treatment and management  ·   · 1/20/20  · POA fever, tachycardia, suspected infection, lactic acid 2 4  · Influenza A positive  · Received fluid bolus in the ER  · Antibiotics not started in the ER due influenza being likely source of SIRS and lactic acidosis  · Chest x-ray: No focal consolidation   Radiology read pending   · WBC: 11  · Will start Tamiflu  · Check procalcitonin  · Will hold off on antibiotics at this point  · Normal saline at 75 an hour  · Trend lactate until normalizes

## 2020-01-22 NOTE — SOCIAL WORK
No CM consult  CM completed brief chart review and pt case reviewed during CM rounds  No identified needs or barriers to d/c  Pt has DIMITRIS FORD [facility] PCP at [facility]  CM scheduled PCP appointment for pt   Per office, pt is scheduled for her OP PT on Friday, January 24 at 9:00 so they schedule a follow up appointment with her PCP at 10:30 am      Information was added to AVS

## 2020-01-22 NOTE — PLAN OF CARE
Problem: Potential for Falls  Goal: Patient will remain free of falls  Description  INTERVENTIONS:  - Assess patient frequently for physical needs  -  Identify cognitive and physical deficits and behaviors that affect risk of falls  -  [place] fall precautions as indicated by assessment   - Educate patient/family on patient safety including physical limitations  - Instruct patient to call for assistance with activity based on assessment  - Modify environment to reduce risk of injury  - Consider OT/PT consult to assist with strengthening/mobility  Outcome: Progressing     Problem: Potential for Falls  Goal: Patient will remain free of falls  Description  INTERVENTIONS:  - Assess patient frequently for physical needs  -  Identify cognitive and physical deficits and behaviors that affect risk of falls    -  [place] fall precautions as indicated by assessment   - Educate patient/family on patient safety including physical limitations  - Instruct patient to call for assistance with activity based on assessment  - Modify environment to reduce risk of injury  - Consider OT/PT consult to assist with strengthening/mobility  Outcome: Progressing

## 2020-01-22 NOTE — ASSESSMENT & PLAN NOTE
· 1/22/20  · Control  · Continue current treatment and management  ·   ·   · 1/21/20  · Control  · Continue current treatment and management  ·   · 1/20/20  · BP reviewed and acceptable  · Continue amlodipine  · Monitor blood pressures

## 2020-01-22 NOTE — PLAN OF CARE
Problem: Potential for Falls  Goal: Patient will remain free of falls  Description  INTERVENTIONS:  - Assess patient frequently for physical needs  -  Identify cognitive and physical deficits and behaviors that affect risk of falls    -  [place] fall precautions as indicated by assessment   - Educate patient/family on patient safety including physical limitations  - Instruct patient to call for assistance with activity based on assessment  - Modify environment to reduce risk of injury  - Consider OT/PT consult to assist with strengthening/mobility  Outcome: Completed     Problem: PAIN - ADULT  Goal: Verbalizes/displays adequate comfort level or baseline comfort level  Description  Interventions:  - Encourage patient to monitor pain and request assistance  - Assess pain using appropriate pain scale  - Administer analgesics based on type and severity of pain and evaluate response  - Implement non-pharmacological measures as appropriate and evaluate response  - Consider cultural and social influences on pain and pain management  - Notify physician/advanced practitioner if interventions unsuccessful or patient reports new pain  Outcome: Completed     Problem: INFECTION - ADULT  Goal: Absence or prevention of progression during hospitalization  Description  INTERVENTIONS:  - Assess and monitor for signs and symptoms of infection  - Monitor lab/diagnostic results  - Monitor all insertion sites, i e  indwelling lines, tubes, and drains  - Monitor endotracheal if appropriate and nasal secretions for changes in amount and color  - [place] appropriate cooling/warming therapies per order  - Administer medications as ordered  - Instruct and encourage patient and family to use good hand hygiene technique  - Identify and instruct in appropriate isolation precautions for identified infection/condition  Outcome: Completed  Goal: Absence of fever/infection during neutropenic period  Description  INTERVENTIONS:  - Monitor WBC    Outcome: Completed     Problem: SAFETY ADULT  Goal: Maintain or return to baseline ADL function  Description  INTERVENTIONS:  -  Assess patient's ability to carry out ADLs; assess patient's baseline for ADL function and identify physical deficits which impact ability to perform ADLs (bathing, care of mouth/teeth, toileting, grooming, dressing, etc )  - Assess/evaluate cause of self-care deficits   - Assess range of motion  - Assess patient's mobility; develop plan if impaired  - Assess patient's need for assistive devices and provide as appropriate  - Encourage maximum independence but intervene and supervise when necessary  - Involve family in performance of ADLs  - Assess for home care needs following discharge   - Consider OT consult to assist with ADL evaluation and planning for discharge  - Provide patient education as appropriate  Outcome: Completed  Goal: Maintain or return mobility status to optimal level  Description  INTERVENTIONS:  - Assess patient's baseline mobility status (ambulation, transfers, stairs, etc )    - Identify cognitive and physical deficits and behaviors that affect mobility  - Identify mobility aids required to assist with transfers and/or ambulation (gait belt, sit-to-stand, lift, walker, cane, etc )  - [place] fall precautions as indicated by assessment  - Record patient progress and toleration of activity level on Mobility SBAR; progress patient to next Phase/Stage  - Instruct patient to call for assistance with activity based on assessment  - Consider rehabilitation consult to assist with strengthening/weightbearing, etc   Outcome: Completed     Problem: DISCHARGE PLANNING  Goal: Discharge to home or other facility with appropriate resources  Description  INTERVENTIONS:  - Identify barriers to discharge w/patient and caregiver  - Arrange for needed discharge resources and transportation as appropriate  - Identify discharge learning needs (meds, wound care, etc )  - Arrange for interpretive services to assist at discharge as needed  - Refer to Case Management Department for coordinating discharge planning if the patient needs post-hospital services based on physician/advanced practitioner order or complex needs related to functional status, cognitive ability, or social support system  Outcome: Completed     Problem: Knowledge Deficit  Goal: Patient/family/caregiver demonstrates understanding of disease process, treatment plan, medications, and discharge instructions  Description  Complete learning assessment and assess knowledge base    Interventions:  - Provide teaching at level of understanding  - Provide teaching via preferred learning methods  Outcome: Completed

## 2020-01-22 NOTE — DISCHARGE SUMMARY
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 at [place]  · No current seizure activity  · Continue Keppra and Topamax  · Check levels    Hypertension  Assessment & Plan  · 1/22/20  · Control  · Continue current treatment and management  ·   ·   · 1/21/20  · Control  · Continue current treatment and management  ·   · 1/20/20  · BP reviewed and acceptable  · Continue amlodipine  · Monitor blood pressures    Type 2 diabetes mellitus with hyperglycemia, without long-term current use of insulin [facility])  Assessment & Plan  Lab Results   Component Value Date    HGBA1C 6 6 (H) 01/20/2020       Recent Labs     01/21/20  1124 01/21/20  1616 01/21/20  2125 01/22/20  0549   POCGLU 87 142* 129 91       Blood Sugar Average: Last 72 hrs:  (P) 102 [id number]   Fingerstick blood glucose remains inter good range during hospitalization  Continue current treatment and management  · Hold home Glucophage  · Fingerstick blood sugars with sliding scale coverage  · Diabetic diet    * Severe sepsis (HCC)  Assessment & Plan  · 1/22/20  · Condition improved  · Secondary to influenza A  · Patient received oral Tamiflu for last 2 days-needs to complete 3 more days  · Plan is to discharge patient home  ·   · 1/21/20  · Condition is improved  · Secondary to influenza A  · Continue current treatment and management  ·   · 1/20/20  · POA fever, tachycardia, suspected infection, lactic acid 2 4  · Influenza A positive  · Received fluid bolus in the ER  · Antibiotics not started in the ER due influenza being likely source of SIRS and lactic acidosis  · Chest x-ray: No focal consolidation  Radiology read pending   · WBC: 11  · Will start Tamiflu  · Check procalcitonin  · Will hold off on antibiotics at this point  · Normal saline at 75 an hour  · Trend lactate until normalizes          Discharging Physician / Practitioner: Stevo Lemus MD  PCP: Megan Key, [address] [place] [address]  Admission Date:   Admission Orders (From admission, onward)     Ordered        01/20/20 0043  Inpatient Admission  Once                   Discharge Date: 01/22/20    Resolved Problems  Date Reviewed: 1/20/2020    None          Consultations During Hospital Stay:  · None    Procedures Performed:   · None    Significant Findings / Test Results:   XR chest portable [[id number]] Collected: 01/20/20 0848   Order Status: Completed Updated: 01/20/20 0850   Narrative:     CHEST     INDICATION:   fever, cough  COMPARISON:  5/9/2019    EXAM PERFORMED/VIEWS:  [place] CHEST PORTABLE      FINDINGS:  Monitor wires are present    Cardiomediastinal silhouette appears unremarkable  The lungs are clear   No pneumothorax or pleural effusion  Osseous structures appear within normal limits for patient age  Impression:       No acute cardiopulmonary disease  Blood culture #1 [[id number]] Collected: 01/19/20 2345   Lab Status: Preliminary result Specimen: Blood from Arm, Right Updated: 01/22/20 0902    Blood Culture No Growth at 48 hrs  Blood culture #2 [[id number]] Collected: 01/19/20 2344   Lab Status: Preliminary result Specimen: Blood from Arm, Left Updated: 01/22/20 0902    Blood Culture No Growth at 48 hrs     Influenza A/B and RSV PCR [[id number]] (Abnormal) Collected: 01/19/20 2344   Lab Status: Final result Specimen: Nasopharyngeal Swab Updated: 01/20/20 0030    INFLUENZA A PCR DetectedAbnormal     INFLUENZA B PCR None Detected    RSV PCR None Detected     · A1c level was 6 6%    Incidental Findings:   · none     Test Results Pending at Discharge (will require follow up):   · none     Outpatient Tests Requested:  · none    Complications:  none    Reason for Admission:  Fever and body ache    Hospital Course:     Kiki Woodward is a 81 y o  female patient who originally presented to the hospital on 1/19/2020 due to fever and body aches, during evaluation, patient was found influenza a positive- which was the source of infection  Patient started receiving Tamiflu and treatment as per respiratory protocol  With the treatment patient's conditions improving  Plan is to discharge patient with oral Tamiflu for 3 more days  Please see above list of diagnoses and related plan for additional information  Condition at Discharge: good     Discharge Day Visit / Exam:     Subjective:  Seen and evaluated during the round  Denies any chest pain, body ache, fever, headache, runny nose  But is still coughing-dry in nature  Does not associated rib pain or chest pain, denies any blood in the sputum  Vitals: Blood Pressure: 139/66 (01/22/20 0817)  Pulse: 74 (01/22/20 0817)  Temperature: 97 5 °F (36 4 °C) (01/22/20 0817)  Temp Source: Temporal (01/20/20 0042)  Respirations: 18 (01/22/20 0817)  Weight - Scale: 85 kg (187 lb 6 3 oz) (01/19/20 2345)  SpO2: 97 % (01/22/20 0817)  Exam:   Physical Exam   Constitutional: She is oriented to person, place, and time  She appears well-developed  No distress  HENT:   Mouth/Throat: Oropharynx is clear and moist  No oropharyngeal exudate  Eyes: Pupils are equal, round, and reactive to light  Conjunctivae and EOM are normal    Neck: Normal range of motion  No JVD present  No thyromegaly present  Cardiovascular: Normal rate  Exam reveals no gallop and no friction rub  No murmur heard  Pulmonary/Chest: Effort normal and breath sounds normal  No stridor  No respiratory distress  She has no wheezes  Abdominal: Soft  Bowel sounds are normal  She exhibits no distension and no mass  There is no tenderness  There is no guarding  Musculoskeletal: Normal range of motion  She exhibits no tenderness  Lymphadenopathy:     She has no cervical adenopathy  Neurological: She is alert and oriented to person, place, and time  She displays normal reflexes  No cranial nerve deficit  Skin: Skin is warm  Capillary refill takes less than 2 seconds  Psychiatric: She has a normal mood and affect  Nursing note and vitals reviewed  Discussion with Family: none    Discharge instructions/Information to patient and family:   See after visit summary for information provided to patient and family  Provisions for Follow-Up Care:  See after visit summary for information related to follow-up care and any pertinent home health orders  Disposition:     Home    For Discharges to [facility]:   · Not Applicable to this Patient - Not Applicable to this Patient    Planned Readmission: none     Discharge Statement:  I spent >30 minutes discharging the patient  This time was spent on the day of discharge  I had direct contact with the patient on the day of discharge  Greater than 50% of the total time was spent examining patient, answering all patient questions, arranging and discussing plan of care with patient as well as directly providing post-discharge instructions  Additional time then spent on discharge activities  Discharge Medications:  See after visit summary for reconciled discharge medications provided to patient and family        ** Please Note: This note has been constructed using a voice recognition system **

## 2020-01-22 NOTE — DISCHARGE INSTRUCTIONS
Chronic Hypertension, Ambulatory Care   Nadja Goel S, Marino BL, et al: 2014 evidence-based guideline for the management of high blood pressure in adults: report from the panel members appointed to the [facility] ([address])  NEHEMIAH 2014; 311(5):507-520  Vaibhav DE: Hypertension, Essential  In: Abraham FJ, ed  The 5-Minute Clinical Consult 2012, 20th ed  [address] [facility],[address] [place] [address], 2012  Marcelo AV , Ella GL , Chuy HR , et al: The Seventh Report of the [employer] on Prevention, Detection, Evaluation, and Treatment of High Blood Pressure: the JNC 7 report  NEHEMIAH 2003; 739(97):4831-5242  Kd LA : Antihypertensives  Medsurg Nurs 2008; 17(5):337-341  Janine RH : Resistant hypertension  Heart 2012; 98(3):254-261  [address] : Best Evidence on management of asymptomatic hypertension in ED patients  J Emerg Nurs 2011; 37(2):174-178  Radha Garrett EJ: Hypertension  In: Handbook of Medical-Surgical Nursing, 4th ed  [address] [facility],[address] [place] [address], 2006  Simeon OLVERA, Wilder G, et al: Call to action on use and reimbursement for home blood pressure monitoring: a joint scientific statement from the [employer], American Society of Hypertension, and Preventive Cardiovascular Nurses Association  J Cardiovasc Nurs 2008; 23(4):299-323  Dorita Mahmood MJ : Managing hypertension in women  [facility] 2008; 20(3):305-310  © 2014 [address] is for End User's use only and may not be sold, redistributed or otherwise used for commercial purposes  All illustrations and images included in CareNotes® are the copyrighted property of [employer] , [employer]  or Haresh Sanchez  The above information is an  only  It is not intended as medical advice for individual conditions or treatments   Talk to your doctor, nurse or pharmacist before following any medical regimen to see if it is safe and effective for you  Influenza   AMBULATORY CARE:   Influenza  (the flu) is an infection caused by the influenza virus  The flu is easily spread when an infected person coughs, sneezes, or has close contact with others  You may be able to spread the flu to others for 1 week or longer after signs or symptoms appear  Common signs and symptoms include the following:   · Fever and chills    · Headaches, body aches, and muscle or joint pain    · Cough, runny nose, and sore throat    · Loss of appetite, nausea, vomiting, or diarrhea    · Tiredness    · Trouble breathing  Call 911 for any of the following:   · You have trouble breathing, and your lips look purple or blue  · You have a seizure  Seek care immediately if:   · You are dizzy, or you are urinating less or not at all  · You have a headache with a stiff neck, and you feel tired or confused  · You have new pain or pressure in your chest     · Your symptoms, such as shortness of breath, vomiting, or diarrhea, get worse  · Your symptoms, such as fever and coughing, seem to get better, but then get worse  Contact your healthcare provider if:   · You have new muscle pain or weakness  · You have questions or concerns about your condition or care  Treatment for influenza  may include any of the following:  · Acetaminophen  decreases pain and fever  It is available without a doctor's order  Ask how much to take and how often to take it  Follow directions  Acetaminophen can cause liver damage if not taken correctly  · NSAIDs , such as ibuprofen, help decrease swelling, pain, and fever  This medicine is available with or without a doctor's order  NSAIDs can cause stomach bleeding or kidney problems in certain people  If you take blood thinner medicine, always ask your healthcare provider if NSAIDs are safe for you  Always read the medicine label and follow directions  · Antivirals  help fight a viral infection    Manage your symptoms:   · Rest  as much as you can to help you recover  · Drink liquids as directed  to help prevent dehydration  Ask how much liquid to drink each day and which liquids are best for you  Prevent the spread of the flu:   · Wash your hands often  Use soap and water  Wash your hands after you use the bathroom, change a child's diapers, or sneeze  Wash your hands before you prepare or eat food  Use gel hand cleanser when soap and water are not available  Do not touch your eyes, nose, or mouth unless you have washed your hands first            · Cover your mouth when you sneeze or cough  Cough into a tissue or the bend of your arm  · Clean shared items with a germ-killing   Clean table surfaces, doorknobs, and light switches  Do not share towels, silverware, and dishes with people who are sick  Wash bed sheets, towels, silverware, and dishes with soap and water  · Wear a mask  over your mouth and nose if you are sick or are near anyone who is sick  · Stay away from others  if you are sick  · Influenza vaccine  helps prevent influenza (flu)  Everyone older than 6 months should get a yearly influenza vaccine  Get the vaccine as soon as it is available, usually in September or October each year  Follow up with your healthcare provider as directed:  Write down your questions so you remember to ask them during your visits  © 2017 [address] Information is for End User's use only and may not be sold, redistributed or otherwise used for commercial purposes  All illustrations and images included in CareNotes® are the copyrighted property of A D A M [employer] Inc  or Haresh Sanchez  The above information is an  only  It is not intended as medical advice for individual conditions or treatments  Talk to your doctor, nurse or pharmacist before following any medical regimen to see if it is safe and effective for you    Sepsis   WHAT YOU NEED TO KNOW:   What is sepsis? Sepsis happens when an infection spreads and causes your body to react strongly to germs  Your body's defense system normally releases chemicals to fight off infection at the infected area  When infection spreads, chemicals are released throughout your body  The chemicals cause inflammation and clotting in small blood vessels that is difficult to control  Inflammation and clotting decrease blood flow and oxygen to your organs  This may cause your organs to stop working correctly  Sepsis is a life-threatening emergency  What increases my risk for sepsis? · An infection anywhere in your body, especially your blood or urinary tract    · Treatment in a hospital for a serious illness, or having an implanted catheter    · Age older than 72    · Chronic conditions such as COPD, heart failure, or diabetes    · A condition that causes a weak immune system, such as cancer, HIV, or kidney disease    · A medicine that causes a weak immune system    · A recent surgery     · Severe injuries, such as large burns  What are the signs and symptoms of sepsis? Seek care immediately for any of the following:  · Fever or very low body temperature    · Severe pain    · Chills or severe shaking    · Cold, pale, or clammy skin    · Extreme weakness    · Fast or irregular heartbeat    · Shortness of breath, or fast breathing    · Confusion, loss of consciousness, or a seizure    · Urinating very little or not at all  How is sepsis diagnosed? · Measurement of your vital signs  may show an abnormal temperature, heart rate, or blood pressure  · Blood and urine tests  will show infection, organ function, and give information about your overall health  They may also show what germ is causing your infection  · An x-ray, ultrasound, CT, or MRI  may show where in your body the infection came from  You may be given contrast liquid to help the infection show up better in the pictures   Tell the healthcare provider if you have ever had an allergic reaction to any type of contrast liquid  Do not enter the MRI room with anything metal  Metal can cause serious injury  Tell the healthcare provider if you have any metal in or on your body  How is sepsis treated? Several treatments may be needed if sepsis causes one or more organs to stop working correctly  Treatments are often started in the emergency room and continued in an intensive care or critical care unit of a hospital  You may need any of the following:  · Medicines  will be given to treat the infection  Medicines may be given to increase your blood pressure and blood flow to your organs  Medicines may also be given to control your blood sugar level, or to prevent stomach ulcers, or blood clots  · Oxygen  may be needed if your blood oxygen level is lower than it should be  · A ventilator  is a machine that gives you oxygen and breathes for you when you cannot breathe well on your own  An endotracheal (ET) tube is put into your mouth or nose and attached to the ventilator  · A blood transfusion  may be needed if bleeding occurs or platelet levels drop  This can happen in severe sepsis  · Removal or change of a catheter or drain  may be needed to get rid of the infection  · Dialysis  may be needed if your kidneys stop working correctly or are damaged during sepsis  Dialysis is a procedure to remove chemicals, wastes, and extra fluid from your blood  · Surgery or other procedures  may be needed to treat problems causing sepsis  This may include draining an abscess or removing infected tissue  What can I do to prevent sepsis? · Ask your healthcare provider about vaccines  Vaccines can help prevent some infections that may lead to sepsis  Get a flu vaccine every year  · Care for wounds and incisions as directed  Keep wounds and incisions clean and dry  Change your bandages when they get wet or dirty   Tell your healthcare provider immediately if you have signs of a wound infection  Signs include redness, warmth, swelling, or pus  · Care for your drain or IV catheter as directed  Ask your healthcare provider how to care for your tube or IV catheter  Correct care of these devices can help prevent infection  · Wash your hands frequently  This can help decrease your risk for infections  Wash your hands before you eat or prepare a meal  Also wash your hands after you use the bathroom  Use soap and water or an alcohol-based hand rub  CARE AGREEMENT:   You have the right to help plan your care  Learn about your health condition and how it may be treated  Discuss treatment options with your caregivers to decide what care you want to receive  You always have the right to refuse treatment  The above information is an  only  It is not intended as medical advice for individual conditions or treatments  Talk to your doctor, nurse or pharmacist before following any medical regimen to see if it is safe and effective for you  © 2017 [address]  Information is for End User's use only and may not be sold, redistributed or otherwise used for commercial purposes  All illustrations and images included in CareNotes® are the copyrighted property of A D A M , Inc  or Haresh Sanchez

## 2020-01-22 NOTE — ASSESSMENT & PLAN NOTE
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  · No current seizure activity  · Continue Keppra and Topamax  · Check levels

## 2020-01-22 NOTE — ASSESSMENT & PLAN NOTE
Lab Results   Component Value Date    HGBA1C 6 6 (H) 01/20/2020       Recent Labs     01/21/20  1124 01/21/20  1616 01/21/20 2125 01/22/20  0549   POCGLU 87 142* 129 91       Blood Sugar Average: Last 72 hrs:  (P) 102 [id number]   Fingerstick blood glucose remains inter good range during hospitalization  Continue current treatment and management  · Hold home Glucophage  · Fingerstick blood sugars with sliding scale coverage  · Diabetic diet

## 2020-01-24 ENCOUNTER — OFFICE VISIT (OUTPATIENT)
Dept: FAMILY MEDICINE CLINIC | Facility: CLINIC | Age: 70
End: 2020-01-24
Payer: MEDICARE

## 2020-01-24 ENCOUNTER — APPOINTMENT (OUTPATIENT)
Dept: PHYSICAL THERAPY | Facility: CLINIC | Age: 70
End: 2020-01-24
Payer: MEDICARE

## 2020-01-24 VITALS
SYSTOLIC BLOOD PRESSURE: 126 MMHG | HEART RATE: 69 BPM | HEIGHT: 64 IN | WEIGHT: 177.47 LBS | DIASTOLIC BLOOD PRESSURE: 70 MMHG | OXYGEN SATURATION: 95 % | BODY MASS INDEX: 30.3 KG/M2

## 2020-01-24 DIAGNOSIS — R53.83 OTHER FATIGUE: ICD-10-CM

## 2020-01-24 DIAGNOSIS — Z09 HOSPITAL DISCHARGE FOLLOW-UP: Primary | ICD-10-CM

## 2020-01-24 DIAGNOSIS — R05.9 COUGH: ICD-10-CM

## 2020-01-24 DIAGNOSIS — J10.1 INFLUENZA A: ICD-10-CM

## 2020-01-24 PROCEDURE — 99496 TRANSJ CARE MGMT HIGH F2F 7D: CPT | Performed by: NURSE PRACTITIONER

## 2020-01-24 NOTE — PROGRESS NOTES
TCM Call (since 12/24/2019)     Date and time call was made  1/21/2020  8:55 AM    Hospital care reviewed  Records reviewed    Patient was hospitialized at  Other (comment)    Comment  [facility]    Date of Admission  01/20/20    Date of discharge  01/21/20    Disposition  Home    Were the patients medications reviewed and updated  No      TCM Call (since 12/24/2019)     Scheduled for follow up? Yes    Did you obtain your prescribed medications  Yes    Do you need help managing your prescriptions or medications  No    Is transportation to your appointment needed  No    I have advised the patient to call PCP with any new or worsening symptoms  Charla Barkley, [address] members; Spouse or Significiant other    Support System  Spouse;  Family    The type of support provided  Emotional; Financial; Physical    Do you have social support  Yes, as much as I need    Are you recieving any outpatient services  No    Are you recieving home care services  No    Are you using any community resources  No    Current waiver services  No    Interperter language line needed  No

## 2020-01-24 NOTE — PROGRESS NOTES
Assessment/Plan:       Diagnoses and all orders for this visit:    Hospital discharge follow-up    Other fatigue    Influenza A    Cough      Continue with the Tamiflu  Suspect she will be fatigued for some time as her body recovers  Reviewed blood culture results as they are negative for any bacteria  Subjective:      Patient ID: Kandy Gomes is a 81 y o  female  Here today for a TCM related to an inpatient stay at the [address] [place] [address] from 01/20/2020 to 01/21/2020  She was admitted for possible sepsis in relation to Influenza A  Was found ot have an elevated Lactic Acid with her ED stay and was admitted for hydration  Notes she felt ill suddenly at home, became very weak with inability to have the strength to stand  Her  call 911 and she was taken to the hospital where she was treated  Reports of feeling very thirsty at that time, states she received a lot of IV fluids  Her blood culture results as of today which is day 4 of the incubation are negative  She reports she has two more days of the Tamiflu to complete  States she is feeling improved but still is fatigued, with a slight cough she is using Robitussin to help treat  The following portions of the patient's history were reviewed and updated as appropriate: allergies, current medications, past family history, past medical history, past social history, past surgical history and problem list     Review of Systems   Constitutional: Positive for fatigue  Respiratory: Positive for cough  Cardiovascular: Negative  Neurological: Negative  Objective:      /70 (BP Location: Left arm, Patient Position: Sitting, Cuff Size: Large)   Pulse 69   Ht 5' 4" (1 626 m)   Wt 80 5 kg (177 lb 7 5 oz)   SpO2 95%   BMI 30 46 kg/m²          Physical Exam   Constitutional: She is oriented to person, place, and time  She appears well-developed and well-nourished  HENT:   Head: Normocephalic and atraumatic     Eyes: Conjunctivae and EOM are normal    Cardiovascular: Normal rate, regular rhythm, normal heart sounds and intact distal pulses  Exam reveals no gallop and no friction rub  No murmur heard  Pulmonary/Chest: Effort normal and breath sounds normal  No stridor  No respiratory distress  She has no wheezes  She has no rales  Neurological: She is alert and oriented to person, place, and time  Skin: Skin is warm and dry

## 2020-01-25 LAB
BACTERIA BLD CULT: NORMAL
BACTERIA BLD CULT: NORMAL

## 2020-01-27 ENCOUNTER — OFFICE VISIT (OUTPATIENT)
Dept: PHYSICAL THERAPY | Facility: CLINIC | Age: 70
End: 2020-01-27
Payer: MEDICARE

## 2020-01-27 DIAGNOSIS — M79.622 PAIN IN LEFT UPPER ARM: Primary | ICD-10-CM

## 2020-01-27 PROCEDURE — 97140 MANUAL THERAPY 1/> REGIONS: CPT | Performed by: PHYSICAL THERAPIST

## 2020-01-27 PROCEDURE — 97110 THERAPEUTIC EXERCISES: CPT | Performed by: PHYSICAL THERAPIST

## 2020-01-27 NOTE — PROGRESS NOTES
PT Evaluation     Today's date: 2020  Patient name: Roxie Mcclure  : 1950  MRN: [id number]  Referring provider: Kelli Santos, *  Dx:   Encounter Diagnosis     ICD-10-CM    1  Pain in left upper arm M79 622                   Assessment  Assessment details: Roxie Mcclure has continued with PT 2-3x/week, progressing as tolerated  she returns today following hospitalization from influenza  She Is fatigued but otherwise safe to perform therapy  She demonstrates improved AROM and strength today, though restriction continues to be in capsular pattern  She would benefit from continued PT to maximize function and increase activity tolerance  Understanding of Dx/Px/POC: good   Prognosis: good    Goals      Short Term Goals:  - Decrease pain by 50% in 3 weeks - met  - Increase L ER  AROM by 50% in 4 weeks - not met  - Increase L Flexion AROM by 50% in 4 weeks - met    Long Term Goals:  - Independent with comprehensive home exercise program at discharge  - Increase Functional Status Measure to: 80  - Increase strength equal to contralateral side at discharge  - Increase ROM to [facility] at discharge    Plan  Planned modality interventions: cryotherapy, high voltage pulsed current: pain management, high voltage pulsed current: spasm management and unattended electrical stimulation  Planned therapy interventions: abdominal trunk stabilization, body mechanics training, neuromuscular re-education, motor coordination training, joint mobilization, manual therapy, patient education, postural training, therapeutic exercise, therapeutic activities, therapeutic training, strengthening and stretching  Frequency: 3x week  Duration in weeks: 6  Plan of Care beginning date: 2020  Plan of Care expiration date: 3/9/2020  Treatment plan discussed with: patient        Subjective Evaluation    History of Present Illness  Mechanism of injury: Pt   Has been off therapy for past 10 days due to hospitalization secondary to influenza  She notes shoulder is stiffening up a bit more than it had  She hopes to continue with PT  Pain  Current pain ratin  At best pain ratin  At worst pain ratin  Location: Lateral upper L arm, superior shoulder, neck  Quality: tight, discomfort and pulling  Relieving factors: relaxation, rest and heat  Aggravating factors: lifting and overhead activity  Progression: worsening    Social Support  Lives with: spouse    Employment status: not working  Hand dominance: right      Diagnostic Tests  X-ray: normal  Patient Goals  Patient goals for therapy: decreased pain, increased motion, return to sport/leisure activities, independence with ADLs/IADLs and increased strength          Objective     Postural Observations  Seated posture: fair  Standing posture: fair    Additional Postural Observation Details  Forward head, rounded shoulders, protracted shoulders, guarded L arm    Palpation     Additional Palpation Details  Pt  TTP along lateral L upper arm  Particularly at lateral, superior biceps   TTP at upper trap, lev scap    Cervical/Thoracic Screen   Cervical range of motion within normal limits with the following exceptions: Reduced B Side bending 50%  Thoracic range of motion within normal limits with the following exceptions: Extension reduced 25%    Neurological Testing     Sensation     Shoulder   Left Shoulder   Intact: light touch    Right Shoulder   Intact: light touch    Reflexes   Left   Biceps (C5/C6): trace (1+)    Right   Biceps (C5/C6): trace (1+)    Active Range of Motion   Left Shoulder   Flexion: 141 degrees   Extension: 32 degrees   Abduction: 126 degrees   External rotation 0°: 20 degrees   External rotation 45°: 25 degrees   Internal rotation 0°: 80 degrees   Internal rotation BTB: T12     Right Shoulder   Normal active range of motion    Passive Range of Motion   Left Shoulder   Flexion: 150 degrees   Abduction: 150 degrees   External rotation 0°: 22 degrees   External rotation 45°: 26 degrees     Joint Play   Left Shoulder  Hypomobile in the posterior capsule and inferior capsule  Strength/Myotome Testing     Additional Strength Details  L Shoulder Grossly 4+/5 in available range    Tests     Left Shoulder   Negative apprehension and Yergason's  Precautions: DMII      Manual  12/26 12/30 1/2 1/6 1/8 1/10 1/13 1/15 1/17 1/27   PROM - SJ SJ DD    SJ MW    [facility] Mobz [facility] (2-3) - LY LY DD  15' LY   15 minMM                                              Exercise Diary  12/26 12/30 1/2 1/6 1/8 1/10 1/13 1/15 1/17 1/27   Pulleys 3' 5' 5' 5' 5' 5' 5' 5' 5' 5'   Table Slides Flexion 10x10" Flexion 10x10" 10x10"  ea Flex and abd 20x ea Flex abd 20x ea Flex ad 30x ea Flex abd 30x ea Flex abd 30x ea np    Pendulums - x30 ea x30ea x30 ea 30x ea 30x  30x 30x 30x 30x   Wall Ladder - 5" x10 5"x10 5"x20 5" 20x 5" 10x 5"x10 10"x10 10"  x10 10x10"   Supine Wand Flexions 20x 20x 20x 20x 20x 30x 30x x30 x30 30x   Supine Wand ER 20x 20x 20x 20x 30x 30x 30x x30 x30 30x   Doorway Stretch - nv Low  30"x4 Low  30"x4 Low 4x30"   High 4x30" gentle 4x30" ea 4x30" ea 4x30" ea 4x30" 4x30"   Bent over rows - 2x10 3x10 3x10 3x10 3x10 10# 3x10 10# 3x10 10# 3x10  15# 3x10   wallpush ups - - - - 20x 20x 20x 20x 20x -   UBE - - - - - 6' 6' 7'  -   S/L ER      20x 20x 20x 4#  2x10 -   S/L flexion      20x 20x 20x 30x -                                                                                                               Modalities  12/26 12/30 1/2          MHP pre PT -            CP post PT prn - def

## 2020-01-29 ENCOUNTER — APPOINTMENT (OUTPATIENT)
Dept: PHYSICAL THERAPY | Facility: CLINIC | Age: 70
End: 2020-01-29
Payer: MEDICARE

## 2020-01-30 ENCOUNTER — OFFICE VISIT (OUTPATIENT)
Dept: PHYSICAL THERAPY | Facility: CLINIC | Age: 70
End: 2020-01-30
Payer: MEDICARE

## 2020-01-30 DIAGNOSIS — M79.622 PAIN IN LEFT UPPER ARM: Primary | ICD-10-CM

## 2020-01-30 PROCEDURE — 97110 THERAPEUTIC EXERCISES: CPT

## 2020-01-30 NOTE — PROGRESS NOTES
Daily Note     Today's date: 2020  Patient name: Todd Cole  : 1950  MRN: [id number]  Referring provider: Edis Leary, *  Dx:   Encounter Diagnosis     ICD-10-CM    1  Pain in left upper arm M79 622                   Subjective: Patient reports shoulder getting better everyday  Objective: See treatment diary below      Assessment: Todd Cole continues with good  progress towards goals  Patient pain appears to be resolving  she required moderate verbal cueing to complete exercises appropriate form and intensity  Patient scapular stability and functional use of arm should improve with continued treatments  Plan: Continue per plan of care        Precautions: DMII      Manual     PROM SJ        MW    [facility] Mobz Gr (2-3) LY         15 minMM                                              Exercise Diary     Pulleys 5'        5' 5'   Table Slides 10x10"        np    Pendulums 30x        30x 30x   Wall Ladder 10"x10        10"  x10 10x10"   Supine Wand Flexions x30        x30 30x   Supine Wand ER x30        x30 30x   Doorway Stretch 4x30"        4x30" 4x30"   Bent over rows   3x10        3x10  15# 3x10   wallpush ups 20x        20x -             -   S/L ER 2#  3x10        4#  2x10 -   S/L flexion 30x        30x -                                                                                              UBE  6 min retro                Modalities              MHP pre PT             CP post PT prn

## 2020-01-31 ENCOUNTER — OFFICE VISIT (OUTPATIENT)
Dept: PHYSICAL THERAPY | Facility: CLINIC | Age: 70
End: 2020-01-31
Payer: MEDICARE

## 2020-01-31 DIAGNOSIS — M79.622 PAIN IN LEFT UPPER ARM: Primary | ICD-10-CM

## 2020-01-31 PROCEDURE — 97140 MANUAL THERAPY 1/> REGIONS: CPT | Performed by: PHYSICAL THERAPIST

## 2020-01-31 PROCEDURE — 97112 NEUROMUSCULAR REEDUCATION: CPT | Performed by: PHYSICAL THERAPIST

## 2020-01-31 PROCEDURE — 97110 THERAPEUTIC EXERCISES: CPT | Performed by: PHYSICAL THERAPIST

## 2020-01-31 NOTE — PROGRESS NOTES
Daily Note     Today's date: 2020  Patient name: Lauren Hidalgo  : 1950  MRN: [id number]  Referring provider: Prabhakar Paniagua, *  Dx:   Encounter Diagnosis     ICD-10-CM    1  Pain in left upper arm M79 622                   Subjective: Pt reports she still as limitations with internal rotation when attempting to wash her back or tuck in her shirt  Objective: See treatment diary below  Patient was treated by Maggi Georges, SPT under direct supervision of Jackelyn Gibbs, PT, DPT  Assessment: Pt needed mod verbal cues for proper form during exercises, particularly with bent over rows and sidelying ER/flexion  Pt denied any pain during PROM even with IR motion which pt self reported as difficult  Pt needed continual reminders to maintain scapular retraction and depression with overhead exercises  Pt would benefit from continued PT intervention to increase PROM to normal limits in all planes of motion  Plan: Continue per plan of care        Precautions: DMII      Manual     PROM SJ MM       MW    [facility] Mobz Gr (2-3) LY MM        15 minMM                                              Exercise Diary     Pulleys 5' 5'       5' 5'   Table Slides 10x10" 10x10"       np    Pendulums 30x 30x       30x 30x   Wall Ladder 10"x10 10x10"       10"  x10 10x10"   Supine Wand Flexions x30 30x       x30 30x   Supine Wand ER x30 30x       x30 30x   Doorway Stretch 4x30" 30"x4       4x30" 4x30"   Bent over rows   3x10 3x10 15#       3x10  15# 3x10   wallpush ups 20x 20x       20x -             -   S/L ER 2#  3x10 2# 3x10       4#  2x10 -   S/L flexion 30x 30x       30x -                                                                                              UBE  6 min retro 6 min retro               Modalities              MHP pre PT  def           CP post PT prn  def

## 2020-02-03 ENCOUNTER — OFFICE VISIT (OUTPATIENT)
Dept: PHYSICAL THERAPY | Facility: CLINIC | Age: 70
End: 2020-02-03
Payer: MEDICARE

## 2020-02-03 DIAGNOSIS — M79.622 PAIN IN LEFT UPPER ARM: Primary | ICD-10-CM

## 2020-02-03 PROCEDURE — 97110 THERAPEUTIC EXERCISES: CPT

## 2020-02-03 NOTE — PROGRESS NOTES
Daily Note     Today's date: 2/3/2020  Patient name: Gerardo Rees  : 1950  MRN: [id number]  Referring provider: Lu Gramajo, *  Dx:   Encounter Diagnosis     ICD-10-CM    1  Pain in left upper arm M79 622                   Subjective: Patient reports using shoulder for cleaning is so much easier  Objective: See treatment diary below      Assessment: Gerardo Rees continues with steady  progress towards goals  Patient pain appears to be decreasing with improved ROM  she required moderate verbal cueing to complete exercises appropriate form and intensity  Patient shoulder function should improve with continued treatments  Plan: Continue per plan of care        Precautions: DMII      Manual   2/3      1/17 1/27   PROM SJ MM SJ      MW    [facility] Mobz Gr (2-3) LY MM LY       15 minMM                                              Exercise Diary  1/30 1/31 2/3      1/17 1/27   Pulleys 5' 5' 5'      5' 5'   Table Slides 10x10" 10x10" 10x10"      np    Pendulums 30x 30x 30x      30x 30x   Wall Ladder 10"x10 10x10" 10x10"      10"  x10 10x10"   Supine Wand Flexions x30 30x 30x      x30 30x   Supine Wand ER x30 30x 30x      x30 30x   Doorway Stretch 4x30" 30"x4 30"x4      4x30" 4x30"   Bent over rows   3x10 3x10 15# 3x10 15#      3x10  15# 3x10   wallpush ups 20x 20x 20x      20x -             -   S/L ER 2#  3x10 2# 3x10 2# 3x10      4#  2x10 -   S/L flexion 30x 30x 30x      30x -                                                                                              UBE  6 min retro 6 min retro 6 min retro              Modalities   1/31 2/3          MHP pre PT  def           CP post PT prn  def

## 2020-02-05 ENCOUNTER — OFFICE VISIT (OUTPATIENT)
Dept: PHYSICAL THERAPY | Facility: CLINIC | Age: 70
End: 2020-02-05
Payer: MEDICARE

## 2020-02-05 DIAGNOSIS — M79.622 PAIN IN LEFT UPPER ARM: Primary | ICD-10-CM

## 2020-02-05 PROCEDURE — 97140 MANUAL THERAPY 1/> REGIONS: CPT | Performed by: PHYSICAL THERAPIST

## 2020-02-05 PROCEDURE — 97110 THERAPEUTIC EXERCISES: CPT | Performed by: PHYSICAL THERAPIST

## 2020-02-05 NOTE — PROGRESS NOTES
Daily Note     Today's date: 2020  Patient name: Rose Leggett  : 1950  MRN: [id number]  Referring provider: Isa Gross, *  Dx:   Encounter Diagnosis     ICD-10-CM    1  Pain in left upper arm M79 622                   Subjective: Pt reports no changes since last visit  She notes that her pain has been minimal        Objective: See treatment diary below      Assessment: Tolerated treatment well and progressing towards her goals  She needed minimal cueing for proper form and intensity this session  Pt noted no increased pain or difficulty during session today  She has good ROM in shoulder, but continues to exhibit increased tightness at end range of ER and shoulder flexion  Continue to address scapular stabilization with movement in future sessions  Patient exhibited good technique with therapeutic exercises and would benefit from continued PT to return pt to her PLOF  Plan: Continue per plan of care        Precautions: DMII      Manual  1/30 1/31 2/3 2/5     1/17 1/27   PROM SJ MM SJ arb     MW    [facility] Mobz [facility] (2-3) LY MM LY arb      15 minMM                                              Exercise Diary  1/30 1/31 2/3 2/5     1/17 1/27   Pulleys 5' 5' 5' 5'     5' 5'   Table Slides 10x10" 10x10" 10x10" 2x10 5"     np    Pendulums 30x 30x 30x 30X     30x 30x   Wall Ladder 10"x10 10x10" 10x10" 10x10"     10"  x10 10x10"   Supine Wand Flexions x30 30x 30x 30X #1     x30 30x   Supine Wand ER x30 30x 30x 30x     x30 30x   Doorway Stretch 4x30" 30"x4 30"x4 30" x4     4x30" 4x30"   Bent over rows   3x10 3x10 15# 3x10 15# 3x10 #15     3x10  15# 3x10   wallpush ups 20x 20x 20x -     20x -             -   S/L ER 2#  3x10 2# 3x10 2# 3x10 #2 3x10     4#  2x10 -   S/L flexion 30x 30x 30x 30x #1     30x -                                                                                              UBE  6 min retro 6 min retro 6 min retro 6' retro             Modalities   1/31 2/3 2/5         MHP pre PT  def Def          CP post PT prn  def

## 2020-02-07 ENCOUNTER — OFFICE VISIT (OUTPATIENT)
Dept: PHYSICAL THERAPY | Facility: CLINIC | Age: 70
End: 2020-02-07
Payer: MEDICARE

## 2020-02-07 DIAGNOSIS — M79.622 PAIN IN LEFT UPPER ARM: Primary | ICD-10-CM

## 2020-02-07 PROCEDURE — 97110 THERAPEUTIC EXERCISES: CPT

## 2020-02-07 PROCEDURE — 97140 MANUAL THERAPY 1/> REGIONS: CPT

## 2020-02-07 NOTE — PROGRESS NOTES
Daily Note     Today's date: 2020  Patient name: Rose Leggett  : 1950  MRN: [id number]  Referring provider: Isa Gross, *  Dx: No diagnosis found  Subjective: Patient reports shoulder is getting better with every visit  Objective: See treatment diary below      Assessment: Rose Leggett continues with good progress towards goals  Patient pain appears to be decreasing but increases with end range ER  she required moderate verbal cueing to complete exercises appropriate form and intensity  Patient ROM should improve with continued treatments  Plan: Continue per plan of care  Precautions: DMII      Manual  1/30 1/31 2/3 2/5 2/7        PROM SJ MM SJ arb SJ        [facility] Mobz Gr (2-3) LY MM LY arb LY                                                   Exercise Diary  1/30 1/31 2/3 2/5 2/7        Pulleys 5' 5' 5' 5' 5'        Table Slides 10x10" 10x10" 10x10" 2x10 5" 10x10"        Pendulums 30x 30x 30x 30X 30x        Wall Ladder 10"x10 10x10" 10x10" 10x10" 10x10"        Supine Wand Flexions x30 30x 30x 30X #1 30X #1        Supine Wand ER x30 30x 30x 30x 30x        Doorway Stretch 4x30" 30"x4 30"x4 30" x4 30"x4        Bent over rows   3x10 3x10 15# 3x10 15# 3x10 #15 3x10 #15        wallpush ups 20x 20x 20x - 20x                     S/L ER 2#  3x10 2# 3x10 2# 3x10 #2 3x10 #2 3x10        S/L flexion 30x 30x 30x 30x #1 30x #1        theraband row     Blue  2x10                                                                                      UBE  6 min retro 6 min retro 6 min retro 6' retro 8'  retro            Modalities   1/31 2/3 2/5 2/7        MHP pre PT  def  Def           CP post PT prn  def

## 2020-02-10 ENCOUNTER — OFFICE VISIT (OUTPATIENT)
Dept: PHYSICAL THERAPY | Facility: CLINIC | Age: 70
End: 2020-02-10
Payer: MEDICARE

## 2020-02-10 DIAGNOSIS — M79.622 PAIN IN LEFT UPPER ARM: Primary | ICD-10-CM

## 2020-02-10 PROCEDURE — 97140 MANUAL THERAPY 1/> REGIONS: CPT

## 2020-02-10 PROCEDURE — 97110 THERAPEUTIC EXERCISES: CPT

## 2020-02-10 NOTE — PROGRESS NOTES
Daily Note     Today's date: 2/10/2020  Patient name: Ashlyn Montoya  : 1950  MRN: [id number]  Referring provider: Gabriella Miranda, *  Dx:   Encounter Diagnosis     ICD-10-CM    1  Pain in left upper arm M79 622                   Subjective: Patient reports pain is decreasing and she has been able to use it more  Objective: See treatment diary below      Assessment: Ashlyn Montoya continues with good  progress towards goals  Patient pain appears to be decreasing  she required moderate verbal cueing to complete exercises appropriate form and intensity  Patient pain should resolve with continued treatments  Plan: Continue per plan of care  Precautions: DMII      Manual  1/30 1/31 2/3 2/5 2/7 2/10       PROM SJ MM SJ arb SJ SJ       [facility] Mobz Gr (2-3) LY MM LY arb LY ARB                                                  Exercise Diary  1/30 1/31 2/3 2/5 2/7 2/10       Pulleys 5' 5' 5' 5' 5' 5'       Table Slides 10x10" 10x10" 10x10" 2x10 5" 10x10" 10"x10       Pendulums 30x 30x 30x 30X 30x 30x       Wall Ladder 10"x10 10x10" 10x10" 10x10" 10x10" 10"x10       Supine Wand Flexions x30 30x 30x 30X #1 30X #1 30X #1       Supine Wand ER x30 30x 30x 30x 30x 30x       Doorway Stretch 4x30" 30"x4 30"x4 30" x4 30"x4 30"x4       Bent over rows   3x10 3x10 15# 3x10 15# 3x10 #15 3x10 #15 3x10 #15       wallpush ups 20x 20x 20x - 20x 20x                    S/L ER 2#  3x10 2# 3x10 2# 3x10 #2 3x10 #2 3x10 #2 3x10       S/L flexion 30x 30x 30x 30x #1 30x #1 3x1#       theraband row     Blue  2x10 Blue  2x10       theraband extension      Blue  2x10                                                                        UBE  6 min retro 6 min retro 6 min retro 6' retro 8'  retro 8'  retro           Modalities   1/31 2/3 2/5 2/7 2/10       MHP pre PT  def  Def           CP post PT prn  def

## 2020-02-12 ENCOUNTER — OFFICE VISIT (OUTPATIENT)
Dept: PHYSICAL THERAPY | Facility: CLINIC | Age: 70
End: 2020-02-12
Payer: MEDICARE

## 2020-02-12 DIAGNOSIS — M79.622 PAIN IN LEFT UPPER ARM: Primary | ICD-10-CM

## 2020-02-12 PROCEDURE — 97140 MANUAL THERAPY 1/> REGIONS: CPT

## 2020-02-12 PROCEDURE — 97110 THERAPEUTIC EXERCISES: CPT

## 2020-02-12 NOTE — PROGRESS NOTES
Daily Note     Today's date: 2020  Patient name: Margie Villarreal  : 1950  MRN: [id number]  Referring provider: Cynthia Goldman, *  Dx:   Encounter Diagnosis     ICD-10-CM    1  Pain in left upper arm M79 622                   Subjective: Patient reports shoulder is getting better and better  Reports pain is almost all gone  Objective: See treatment diary below      Assessment: Margie Villarreal continues with good progress towards goals  Patient pain appears to be resolving  she required moderate verbal cueing to complete exercises appropriate form and intensity  Patient shoulder function should improve with continued treatments  Plan: Continue per plan of care  Precautions: DMII      Manual   2/3 2/5 2/7 2/10 2/12      PROM SJ MM SJ arb SJ SJ SJ      [facility] Mobz Gr (2-3) LY MM LY arb LY ARB LY                                                 Exercise Diary   2/3 2/5 2/7 2/10 2/12      Pulleys 5' 5' 5' 5' 5' 5' 5'      Table Slides 10x10" 10x10" 10x10" 2x10 5" 10x10" 10"x10 10"x10      Pendulums 30x 30x 30x 30X 30x 30x x30      Wall Ladder 10"x10 10x10" 10x10" 10x10" 10x10" 10"x10 10"x10      Supine Wand Flexions x30 30x 30x 30X #1 30X #1 30X #1 30X #1      Supine Wand ER x30 30x 30x 30x 30x 30x 30x      Doorway Stretch 4x30" 30"x4 30"x4 30" x4 30"x4 30"x4 30"x4      Bent over rows   3x10 3x10 15# 3x10 15# 3x10 #15 3x10 #15 3x10 #15 3x10 #15      wallpush ups 20x 20x 20x - 20x 20x x20                   S/L ER 2#  3x10 2# 3x10 2# 3x10 #2 3x10 #2 3x10 #2 3x10 #2 3x10      S/L flexion 30x 30x 30x 30x #1 30x #1 3x1# 30x #1      theraband row     Blue  2x10 Blue  2x10 Blue  3x10      theraband extension      Blue  2x10 Blue  3x10                                                                       UBE  6 min retro 6 min retro 6 min retro 6' retro 8'  retro 8'  retro 8'  retro          Modalities   1/31 2/3 2/5 2/7 2/10 2/12      MHP pre PT  def  Def           CP post PT prn def

## 2020-02-14 ENCOUNTER — APPOINTMENT (OUTPATIENT)
Dept: PHYSICAL THERAPY | Facility: CLINIC | Age: 70
End: 2020-02-14
Payer: MEDICARE

## 2020-02-17 ENCOUNTER — OFFICE VISIT (OUTPATIENT)
Dept: PHYSICAL THERAPY | Facility: CLINIC | Age: 70
End: 2020-02-17
Payer: MEDICARE

## 2020-02-17 DIAGNOSIS — M79.622 PAIN IN LEFT UPPER ARM: Primary | ICD-10-CM

## 2020-02-17 PROCEDURE — 97110 THERAPEUTIC EXERCISES: CPT

## 2020-02-17 PROCEDURE — 97140 MANUAL THERAPY 1/> REGIONS: CPT

## 2020-02-17 NOTE — PROGRESS NOTES
Daily Note     Today's date: 2020  Patient name: Xander Caldwell  : 1950  MRN: [id number]  Referring provider: Margret Randle, *  Dx:   Encounter Diagnosis     ICD-10-CM    1  Pain in left upper arm M79 622                   Subjective: Patient reports able to use arm more and more everyday  Reports happy with progress to this point  Objective: See treatment diary below      Assessment: Xander Caldwell continues with good progress towards goals  Patient pain appears to be resolved and ROM in all planes has improved greatly  she required moderate verbal cueing to complete exercises appropriate form and intensity  Patient shoulder function should normalized with continued treatments  Plan: Continue per plan of care        Precautions: DMII      Manual  1/30 1/31 2/3 2/5 2/7 2/10 2/12 2/17     PROM SJ MM SJ arb SJ SJ SJ      [facility] Mobz Gr (2-3) LY MM LY arb LY ARB LY                                                 Exercise Diary   2/3 2/5 2/7 2/10 2/12 2/17     Pulleys 5' 5' 5' 5' 5' 5' 5' 5'     Table Slides 10x10" 10x10" 10x10" 2x10 5" 10x10" 10"x10 10"x10 10"x10     Pendulums 30x 30x 30x 30X 30x 30x x30 x30     Wall Ladder 10"x10 10x10" 10x10" 10x10" 10x10" 10"x10 10"x10 10"x10     Supine Wand Flexions x30 30x 30x 30X #1 30X #1 30X #1 30X #1 2#  x30     Supine Wand ER x30 30x 30x 30x 30x 30x 30x 30x     Doorway Stretch 4x30" 30"x4 30"x4 30" x4 30"x4 30"x4 30"x4 30"x4     Bent over rows   3x10 3x10 15# 3x10 15# 3x10 #15 3x10 #15 3x10 #15 3x10 #15 3x10 #15     wallpush ups 20x 20x 20x - 20x 20x x20 x20                  S/L ER 2#  3x10 2# 3x10 2# 3x10 #2 3x10 #2 3x10 #2 3x10 #2 3x10 3#  3x10     S/L flexion 30x 30x 30x 30x #1 30x #1 3x1# 30x #1 2#  x30     theraband row     Blue  2x10 Blue  2x10 Blue  3x10 Blue  3x10     theraband extension      Blue  2x10 Blue  3x10 Blue  3x10                                                                      UBE  6 min retro 6 min retro 6 min retro 6' retro 8'  retro 8'  retro 8'  retro 8'  retro         Modalities   1/31 2/3 2/5 2/7 2/10 2/12 2/17     MHP pre PT  def  Def           CP post PT prn  def

## 2020-02-19 ENCOUNTER — OFFICE VISIT (OUTPATIENT)
Dept: PHYSICAL THERAPY | Facility: CLINIC | Age: 70
End: 2020-02-19
Payer: MEDICARE

## 2020-02-19 DIAGNOSIS — M79.622 PAIN IN LEFT UPPER ARM: Primary | ICD-10-CM

## 2020-02-19 PROCEDURE — 97140 MANUAL THERAPY 1/> REGIONS: CPT

## 2020-02-19 PROCEDURE — 97110 THERAPEUTIC EXERCISES: CPT

## 2020-02-19 NOTE — PROGRESS NOTES
Daily Note     Today's date: 2020  Patient name: Lauren Hidalgo  : 1950  MRN: [id number]  Referring provider: Prabhakar Paniagua, *  Dx:   Encounter Diagnosis     ICD-10-CM    1  Pain in left upper arm M79 622                   Subjective: Patient reports shoulder is feeling better  Reports only feels limited when her arm turns "out"  Objective: See treatment diary below      Assessment: Lauren Hiadlgo continues with good progress towards goals  Patient pain appears to be resolved except for end range ER which has soft end feel  she required moderate verbal cueing to complete exercises appropriate form and intensity  Patient ROM and strength should improve with continued treatments  Plan: Continue per plan of care        Precautions: DMII      Manual  1/30 1/31 2/3 2/5 2/7 2/10 2/12 2/17 2/19    PROM SJ MM SJ arb SJ SJ SJ SJ SJ    [address] [facility] (2-3) LY MM LY arb LY ARB LY LY                                                Exercise Diary   2/3 2/5 2/7 2/10 2/12 2/17 2/19    Pulleys 5' 5' 5' 5' 5' 5' 5' 5' 5'    Table Slides 10x10" 10x10" 10x10" 2x10 5" 10x10" 10"x10 10"x10 10"x10 10"x10    Pendulums 30x 30x 30x 30X 30x 30x x30 x30 x30    Wall Ladder 10"x10 10x10" 10x10" 10x10" 10x10" 10"x10 10"x10 10"x10 10"x15    Supine Wand Flexions x30 30x 30x 30X #1 30X #1 30X #1 30X #1 2#  x30 2#  x30    Supine Wand ER x30 30x 30x 30x 30x 30x 30x 30x 30x    Doorway Stretch 4x30" 30"x4 30"x4 30" x4 30"x4 30"x4 30"x4 30"x4 30"x4    Bent over rows   3x10 3x10 15# 3x10 15# 3x10 #15 3x10 #15 3x10 #15 3x10 #15 3x10 #15 3x10 #15    wallpush ups 20x 20x 20x - 20x 20x x20 x20 x25                 S/L ER 2#  3x10 2# 3x10 2# 3x10 #2 3x10 #2 3x10 #2 3x10 #2 3x10 3#  3x10 3#  3x10    S/L flexion 30x 30x 30x 30x #1 30x #1 3x1# 30x #1 2#  x30 2#  x30    theraband row     Blue  2x10 Blue  2x10 Blue  3x10 Blue  3x10 Blue  3x10    theraband extension      Blue  2x10 Blue  3x10 Blue  3x10 Blue  3x10 UBE  6 min retro 6 min retro 6 min retro 6' retro 8'  retro 8'  retro 8'  retro 8'  retro 8'  retro        Modalities   1/31 2/3 2/5 2/7 2/10 2/12 2/17 2/19    MHP pre PT  def  Def           CP post PT prn  def

## 2020-02-21 ENCOUNTER — OFFICE VISIT (OUTPATIENT)
Dept: PHYSICAL THERAPY | Facility: CLINIC | Age: 70
End: 2020-02-21
Payer: MEDICARE

## 2020-02-21 DIAGNOSIS — M79.622 PAIN IN LEFT UPPER ARM: Primary | ICD-10-CM

## 2020-02-21 PROCEDURE — 97110 THERAPEUTIC EXERCISES: CPT

## 2020-02-21 PROCEDURE — 97140 MANUAL THERAPY 1/> REGIONS: CPT

## 2020-02-21 NOTE — PROGRESS NOTES
Daily Note     Today's date: 2020  Patient name: Margie Villarreal  : 1950  MRN: [id number]  Referring provider: Cynthia Goldman, *  Dx:   Encounter Diagnosis     ICD-10-CM    1  Pain in left upper arm M79 622                   Subjective: Patient reports uses arm to do hair and scratch her back  Objective: See treatment diary below      Assessment: Margie Villarreal continues with good  progress towards goals  Patient pain appears to be resolving with improving ROM and strength  she required moderate verbal cueing to complete exercises appropriate form and intensity  Marla Dias would benefit from a shift to strengthening as main treatment focus as ROM has greatly improved  Patient strength should improve with continued treatments  Plan: Continue per plan of care        Precautions: DMII      Manual  1/30 1/31 2/3 2/5 2/7 2/10 2/12 2/17 2/19 2/21   PROM SJ MM SJ arb SJ SJ SJ SJ SJ sj   [facility] Mob[facility] (2-3) LY MM LY arb LY ARB LY LY                                                Exercise Diary   2/3 2/5 2/7 2/10 2/12 2/17 2/19 2/21   Pulleys 5' 5' 5' 5' 5' 5' 5' 5' 5' 5'   Table Slides 10x10" 10x10" 10x10" 2x10 5" 10x10" 10"x10 10"x10 10"x10 10"x10 10"x10   Pendulums 30x 30x 30x 30X 30x 30x x30 x30 x30 x30   Wall Ladder 10"x10 10x10" 10x10" 10x10" 10x10" 10"x10 10"x10 10"x10 10"x15 10"x15   Supine Wand Flexions x30 30x 30x 30X #1 30X #1 30X #1 30X #1 2#  x30 2#  x30 D/C   Supine Wand ER x30 30x 30x 30x 30x 30x 30x 30x 30x 30x   Doorway Stretch 4x30" 30"x4 30"x4 30" x4 30"x4 30"x4 30"x4 30"x4 30"x4 30"x4   Bent over rows   3x10 3x10 15# 3x10 15# 3x10 #15 3x10 #15 3x10 #15 3x10 #15 3x10 #15 3x10 #15 3x10 #15   wallpush ups 20x 20x 20x - 20x 20x x20 x20 x25 x25                S/L ER 2#  3x10 2# 3x10 2# 3x10 #2 3x10 #2 3x10 #2 3x10 #2 3x10 3#  3x10 3#  3x10 3#  3x10   S/L flexion 30x 30x 30x 30x #1 30x #1 3x1# 30x #1 2#  x30 2#  x30 D/C   theraband row     Blue  2x10 Blue  2x10 Blue  3x10 Blue  3x10 Blue  3x10 Blue  3x10   theraband extension      Blue  2x10 Blue  3x10 Blue  3x10 Blue  3x10 Blue  3x10   theraband IR,ER          green  3x10   Standing flexion, abduction,scaption          2x10 ea                                          UBE  6 min retro 6 min retro 6 min retro 6' retro 8'  retro 8'  retro 8'  retro 8'  retro 8'  retro 8'  retro       Modalities   1/31 2/3 2/5 2/7 2/10 2/12 2/17 2/19 2/21   MHP pre PT  def  Def           CP post PT prn  def

## 2020-02-24 ENCOUNTER — OFFICE VISIT (OUTPATIENT)
Dept: PHYSICAL THERAPY | Facility: CLINIC | Age: 70
End: 2020-02-24
Payer: MEDICARE

## 2020-02-24 DIAGNOSIS — M79.622 PAIN IN LEFT UPPER ARM: Primary | ICD-10-CM

## 2020-02-24 PROCEDURE — 97110 THERAPEUTIC EXERCISES: CPT | Performed by: PHYSICAL THERAPIST

## 2020-02-24 PROCEDURE — 97140 MANUAL THERAPY 1/> REGIONS: CPT | Performed by: PHYSICAL THERAPIST

## 2020-02-24 NOTE — PROGRESS NOTES
Re-Evaluation     Today's date: 2020  Patient name: Yolande Massey  : 1950  MRN: [id number]  Referring provider: Abilio Murray, *  Dx:   Encounter Diagnosis     ICD-10-CM    1  Pain in left upper arm M79 622                   Assessment   Assessment details: Yolande Massey has continued with PT 2-3x/week, progressing as tolerated  Pt demonstrates improved AROM and strength as compared to last re-evaluation  Pt continues to have limited ROM in external rotation likely due to muscular guarding; ROM improved with light stretching of subscapularis muscle in supine  Pt would benefit from continued PT intervention to improve capsular mobility and rotator cuff strength to maximize function       Understanding of Dx/Px/POC: good   Prognosis: good    Goals    Short Term Goals:  - Decrease pain by 50% in 3 weeks - met  - Increase L ER  AROM by 50% in 4 weeks - Partially met, continuing   - Increase L Flexion AROM by 50% in 4 weeks - met    Long Term Goals:  - Independent with comprehensive home exercise program at discharge  - Increase Functional Status Measure to: 80; improving- recent score of 71   - Increase strength equal to contralateral side at discharge  - Increase ROM to [facility] at discharge    Plan  Planned modality interventions: cryotherapy, high voltage pulsed current: pain management, high voltage pulsed current: spasm management and unattended electrical stimulation  Planned therapy interventions: abdominal trunk stabilization, body mechanics training, neuromuscular re-education, motor coordination training, joint mobilization, manual therapy, patient education, postural training, therapeutic exercise, therapeutic activities, therapeutic training, strengthening and stretching  Frequency: 3x week  Duration in weeks: 6  Plan of Care beginning date: 20  Plan of Care expiration date: 20  Treatment plan discussed with: patient      Subjective Evaluation    History of Present Illness  Mechanism of injury: Pt reports that she feels her shoulder pain and ROM have significantly improved since starting therapy  Pt still has difficulty with reaching behind her back in the shower and when getting dressed  Pt does not feel limited in any activities and has recently returned to moving furniture and cleaning overhead at home  Pain  Current pain ratin  At best pain ratin  At worst pain ratin  Location: Lateral upper L arm, superior shoulder, neck  Quality: tight, discomfort and pulling  Relieving factors: relaxation, rest and heat  Aggravating factors: lifting and overhead activity  Progression: improving      Patient Goals  Patient goals for therapy: decreased pain, increased motion, return to sport/leisure activities, independence with ADLs/IADLs and increased strength      Objective     Postural Observations  Seated posture: fair  Standing posture: fair    Additional Postural Observation Details  Forward head, rounded shoulders, protracted shoulders, guarded L arm    Palpation     Additional Palpation Details  Pt denies any tenderness to palpation of L upper quarter  Active Range of Motion   Left Shoulder   Flexion: 160 degrees   Extension: 47 degrees   Abduction: 130 degrees   External rotation 0°: 20 degrees   External rotation 45°: 57 degrees   Internal rotation 0°: 80 degrees   Internal rotation BTB: T12     Right Shoulder   Normal active range of motion    Passive Range of Motion   Left Shoulder   Flexion: 170 degrees   Abduction: 150 degrees   External rotation 0°: 22 degrees   External rotation 45°: 60 degrees     Joint Play   Left Shoulder  Hypomobile in the posterior capsule and inferior capsule  Strength/Myotome Testing     Additional Strength Details  L Shoulder Grossly 4+/5 in available range    Patient was treated by SONIA Hurley under direct supervision of Fiordaliza Beck PT, DPT        Precautions: DMII    Daily Treatment Diary:      Re-Evaluation Date: 20  Compliance 2/24                     Visit Number 1                    Re-Eval  Y                 MARISA   Foto Captured 2/17                        Manual  2/24 2/21   PROM 10'         sj   [address] [facility] (2-3) 5'                                                       Exercise Diary  2/24 2/21   Pulleys 5'         5'   Table Slides NV         10"x10   Pendulums x30         x30   Wall Ladder 10"x15         10"x15   Supine Wand Flexions --         D/C   Supine Wand ER 30x         30x   Doorway Stretch 4x30"         30"x4   Bent over rows 30x 15#         3x10 #15   wallpush ups 30x         x25                S/L ER 3# 3x10         3#  3x10   S/L flexion --         D/C   theraband row Blue 3x10         Blue  3x10   theraband extension Blue 3x10         Blue  3x10   theraband IR,ER Green 2x10         green  3x10   Standing flexion, abduction,scaption 20x ea         2x10 ea                                          UBE  8'  retro         8'  retro       Modalities  2/24 2/21   MHP pre PT             CP post PT prn def

## 2020-02-26 ENCOUNTER — OFFICE VISIT (OUTPATIENT)
Dept: PHYSICAL THERAPY | Facility: CLINIC | Age: 70
End: 2020-02-26
Payer: MEDICARE

## 2020-02-26 DIAGNOSIS — M79.622 PAIN IN LEFT UPPER ARM: Primary | ICD-10-CM

## 2020-02-26 PROCEDURE — 97110 THERAPEUTIC EXERCISES: CPT

## 2020-02-26 PROCEDURE — 97140 MANUAL THERAPY 1/> REGIONS: CPT

## 2020-02-26 NOTE — PROGRESS NOTES
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  Reports pain is staying low  Objective: See treatment diary below      Assessment: Margie Villarreal continues with good  progress towards goals  Patient pain appears to be only present with end range stretch particularly in ER  she required moderate verbal cueing to complete exercises appropriate form and intensity  Patient ROM should improve with continued treatments  Plan: Continue per plan of care        Precautions: DMII    Daily Treatment Diary:      Re-Evaluation Date: 20  Compliance                    Visit Number 1 2                   Re-Eval  Y n                MC   Foto Captured  n                       Manual     PROM 10' 10'           [facility] (2-3) 5' 5'                                                      Exercise Diary     Pulleys 5' 5'        5'   Table Slides NV -        10"x10   Pendulums x30 x30        x30   Wall Ladder 10"x15 10"x15        10"x15   Supine Wand Flexions -- -        D/C   Supine Wand ER 30x 30x        30x   Doorway Stretch 4x30" 4x30"        30"x4   Bent over rows 30x 15# 45x  15#        3x10 #15   wallpush ups 30x 30x        x25                S/L ER 3# 3x10 3# 3x10        3#  3x10   S/L flexion -- -        D/C   theraband row Blue 3x10 Blue 3x10        Blue  3x10   theraband extension Blue 3x10 Blue 3x10        Blue  3x10   theraband IR,ER Green 2x10 green  3x10        green  3x10   Standing flexion, abduction,scaption 20x ea 30x ea        2x10 ea                                          UBE  8'  retro 8'  retro        8'  retro       Modalities     [facility] pre PT             CP post PT prn def

## 2020-02-28 ENCOUNTER — APPOINTMENT (OUTPATIENT)
Dept: PHYSICAL THERAPY | Facility: CLINIC | Age: 70
End: 2020-02-28
Payer: MEDICARE

## 2020-03-02 ENCOUNTER — APPOINTMENT (OUTPATIENT)
Dept: PHYSICAL THERAPY | Facility: CLINIC | Age: 70
End: 2020-03-02
Payer: MEDICARE

## 2020-03-04 ENCOUNTER — OFFICE VISIT (OUTPATIENT)
Dept: PHYSICAL THERAPY | Facility: CLINIC | Age: 70
End: 2020-03-04
Payer: MEDICARE

## 2020-03-04 DIAGNOSIS — M79.622 PAIN IN LEFT UPPER ARM: Primary | ICD-10-CM

## 2020-03-04 PROCEDURE — 97110 THERAPEUTIC EXERCISES: CPT

## 2020-03-04 PROCEDURE — 97140 MANUAL THERAPY 1/> REGIONS: CPT

## 2020-03-04 NOTE — PROGRESS NOTES
Daily Note     Today's date: 3/4/2020  Patient name: Ravi Cruz  : 1950  MRN: [id number]  Referring provider: Forrest Hidalgo, *  Dx:   Encounter Diagnosis     ICD-10-CM    1  Pain in left upper arm M79 622                   Subjective: Patient reports pain still only at the top of motion but is not limiting her at all day to day  Objective: See treatment diary below      Assessment: Ravi Cruz continues with good  progress towards goals  Patient pain appears to be resolved with ER most limited but is able to achieve close to full ROM with stretching  she required moderate verbal cueing to complete exercises appropriate form and intensity  Patient ROM should return to prior level of function with continued treatments  Plan: Continue per plan of care        Precautions: DMII    Daily Treatment Diary:      Re-Evaluation Date: 20  Compliance 2/24   2/26  3/4                 Visit Number 1 2  3                 Re-Eval  Y n  n              MC   Foto Captured  n  n                     Manual  2/24 2/26 3/4       2/21   PROM 10' 10' 10'       sj   [facility] (2-3) 5' 5' 5'                                                     Exercise Diary  2/24 2/26 3/4       2/21   Pulleys 5' 5' 5'       5'   Table Slides NV - -       10"x10   Pendulums x30 x30 x30       x30   Wall Ladder 10"x15 10"x15 10"x15       10"x15   Supine Wand Flexions -- - -       D/C   Supine Wand ER 30x 30x 30x       30x   Doorway Stretch 4x30" 4x30" 4x30"       30"x4   Bent over rows 30x 15# 45x  15# 45x  15#       3x10 #15   wallpush ups 30x 30x 30x       x25                S/L ER 3# 3x10 3# 3x10 3# 3x10       3#  3x10   S/L flexion -- - -       D/C   theraband row Blue 3x10 Blue 3x10 Blue 3x10       Blue  3x10   theraband extension Blue 3x10 Blue 3x10 Blue 3x10       Blue  3x10   theraband IR,ER Green 2x10 green  3x10 green  3x10 ea       green  3x10   Standing flexion, abduction,scaption 20x ea 30x ea 30x ea 2x10 ea                                          UBE  8'  retro 8'  retro 10' retro       8'  retro       Modalities  2/24 2/26 3/4       2/21   MHP pre PT             CP post PT prn def

## 2020-03-05 DIAGNOSIS — M79.622 PAIN IN LEFT UPPER ARM: ICD-10-CM

## 2020-03-05 DIAGNOSIS — I10 ESSENTIAL HYPERTENSION: ICD-10-CM

## 2020-03-05 DIAGNOSIS — R05.9 COUGH: ICD-10-CM

## 2020-03-05 RX ORDER — AMLODIPINE BESYLATE 2.5 MG/1
5 TABLET ORAL DAILY
Qty: 180 TABLET | Refills: 0 | Status: SHIPPED | OUTPATIENT
Start: 2020-03-05 | End: 2020-05-22 | Stop reason: SDUPTHER

## 2020-03-05 NOTE — TELEPHONE ENCOUNTER
Patient called for refill on:    Amlodipine 2 5mg bid    Per  to be sent to [address] [facility]

## 2020-03-06 ENCOUNTER — OFFICE VISIT (OUTPATIENT)
Dept: PHYSICAL THERAPY | Facility: CLINIC | Age: 70
End: 2020-03-06
Payer: MEDICARE

## 2020-03-06 DIAGNOSIS — M79.622 PAIN IN LEFT UPPER ARM: Primary | ICD-10-CM

## 2020-03-06 PROCEDURE — 97140 MANUAL THERAPY 1/> REGIONS: CPT

## 2020-03-06 PROCEDURE — 97110 THERAPEUTIC EXERCISES: CPT

## 2020-03-06 NOTE — PROGRESS NOTES
Daily Note     Today's date: 3/6/2020  Patient name: Kirill Pa  : 1950  MRN: [id number]  Referring provider: Marla Oneill, *  Dx:   Encounter Diagnosis     ICD-10-CM    1  Pain in left upper arm M79 622                   Subjective: Patient reports shoulder still getting better and better  Objective: See treatment diary below      Assessment: Kirill Pa continues with good  progress towards goals  Patient pain appears to be resolved   she required moderate verbal cueing to complete exercises appropriate form and intensity  Patient shoulder function should return to prior level of function with continued treatments  Plan: Continue per plan of care        Precautions: DMII    Daily Treatment Diary:      Re-Evaluation Date: 20  Compliance 2/24   2/26   3/4  3/6               Visit Number 1 2   3  4               Re-Eval  Y n   n  n            MC   Foto Captured  n   n  n                   Manual  2/24 2/26 3/4 3/6      2/21   PROM 10' 10' 10' 10'      sj    [facility] (2-3) 5' 5' 5' 5'                                                    Exercise Diary  2/24 2/26 3/4 3/6      2/21   Pulleys 5' 5' 5' 5'      5'   Table Slides NV - - -      10"x10   Pendulums x30 x30 x30 x30      x30   Wall Ladder 10"x15 10"x15 10"x15 10"x  15      10"x15   Supine Wand Flexions -- - -       D/C   Supine Wand ER 30x 30x 30x x30      30x   Doorway Stretch 4x30" 4x30" 4x30" 4x30"      30"x4   Bent over rows 30x 15# 45x  15# 45x  15# 45x  15#      3x10 #15   wallpush ups 30x 30x 30x 30x      x25                S/L ER 3# 3x10 3# 3x10 3# 3x10 3# 3x10      3#  3x10   S/L flexion -- - -       D/C   theraband row Blue 3x10 Blue 3x10 Blue 3x10 Black  3x10      Blue  3x10   theraband extension Blue 3x10 Blue 3x10 Blue 3x10 Black  3x10      Blue  3x10   theraband IR,ER Green 2x10 green  3x10 green  3x10 ea Blue  3x10 ea      green  3x10   Standing flexion, abduction,scaption 20x ea 30x ea 30x ea 1#  30x ea 2x10 ea                                          UBE  8'  retro 8'  retro 10' retro 5'/5'      8'  retro       Modalities  2/24 2/26 3/4 3/6      2/21   MHP pre PT             CP post PT prn def

## 2020-03-09 ENCOUNTER — APPOINTMENT (OUTPATIENT)
Dept: PHYSICAL THERAPY | Facility: CLINIC | Age: 70
End: 2020-03-09
Payer: MEDICARE

## 2020-03-11 ENCOUNTER — OFFICE VISIT (OUTPATIENT)
Dept: PHYSICAL THERAPY | Facility: CLINIC | Age: 70
End: 2020-03-11
Payer: MEDICARE

## 2020-03-11 DIAGNOSIS — M79.622 PAIN IN LEFT UPPER ARM: Primary | ICD-10-CM

## 2020-03-11 PROCEDURE — 97110 THERAPEUTIC EXERCISES: CPT

## 2020-03-11 PROCEDURE — 97140 MANUAL THERAPY 1/> REGIONS: CPT

## 2020-03-11 NOTE — PROGRESS NOTES
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 and she has had no issues with shoulder  Objective: See treatment diary below      Assessment: Kiki Woodward continues with good  progress towards goals  Patient pain appears to be resolving with imporved ROM  she required moderate verbal cueing to complete exercises appropriate form and intensity  Patient shoulder function should resolve with continued treatments  Plan: Continue per plan of care        Precautions: DMII    Daily Treatment Diary:      Re-Evaluation Date: 20  Compliance 2/24   2/26   3/4   3/6  3/11             Visit Number 1 2   3   4  5             Re-Eval  Y n   n   n  n          MC   Foto Captured  n   n   n  n                 Manual  2/24 2/26 3/4 3/6 3/11     2/21   PROM 10' 10' 10' 10' 10'     sj   [facility] (2-3) 5' 5' 5' 5' 5'                                                   Exercise Diary  2/24 2/26 3/4 3/6 3/11     2/21   Pulleys 5' 5' 5' 5' 5'     5'   Table Slides NV - - - -     10"x10   Pendulums x30 x30 x30 x30 x30     x30   Wall Ladder 10"x15 10"x15 10"x15 10"x  15 10"x  15     10"x15   Supine Wand Flexions -- - -       D/C   Supine Wand ER 30x 30x 30x x30 x30     30x   Doorway Stretch 4x30" 4x30" 4x30" 4x30" 4x30"     30"x4   Bent over rows 30x 15# 45x  15# 45x  15# 45x  15# 45x  15#     3x10 #15   wallpush ups 30x 30x 30x 30x 30x     x25                S/L ER 3# 3x10 3# 3x10 3# 3x10 3# 3x10 4#  3x10     3#  3x10   S/L flexion -- - -       D/C   theraband row Blue 3x10 Blue 3x10 Blue 3x10 Black  3x10 Black  3x10     Blue  3x10   theraband extension Blue 3x10 Blue 3x10 Blue 3x10 Black  3x10 Black  3x10     Blue  3x10   theraband IR,ER Green 2x10 green  3x10 green  3x10 ea Blue  3x10 ea Blue  3x10 ea     green  3x10   Standing flexion, abduction,scaption 20x ea 30x ea 30x ea 1#  30x ea 1#  30x ea     2x10 ea                                          UBE  8'  retro 8'  retro 10' retro 5'/5' 5'/5'     8'  retro       Modalities  2/24 2/26 3/4 3/6 3/11     2/21   MHP pre PT             CP post PT prn def

## 2020-03-13 ENCOUNTER — OFFICE VISIT (OUTPATIENT)
Dept: PHYSICAL THERAPY | Facility: CLINIC | Age: 70
End: 2020-03-13
Payer: MEDICARE

## 2020-03-13 DIAGNOSIS — M79.622 PAIN IN LEFT UPPER ARM: Primary | ICD-10-CM

## 2020-03-13 PROCEDURE — 97140 MANUAL THERAPY 1/> REGIONS: CPT

## 2020-03-13 PROCEDURE — 97110 THERAPEUTIC EXERCISES: CPT

## 2020-03-13 NOTE — PROGRESS NOTES
Daily Note     Today's date: 3/13/2020  Patient name: Roxie Mcclure  : 1950  MRN: [id number]  Referring provider: Kelli Santos, *  Dx:   Encounter Diagnosis     ICD-10-CM    1  Pain in left upper arm M79 622                   Subjective: Patient reports shoulder held up with all the painting she did  Reports happy with progress  Objective: See treatment diary below      Assessment: Roxie Mcclure continues with good progress towards goals  Patient pain appears to be resolved even with challenge  she required moderate verbal cueing to complete exercises appropriate form and intensity  Patient shoulder function should improve with continued treatments  Plan: Continue per plan of care        Precautions: DMII    Daily Treatment Diary:      Re-Evaluation Date: 20  Compliance 2/24   2/26   3/4   3/6   3/11  3/13           Visit Number 1 2   3   4   5  5           Re-Eval  Y n   n   n   n  n        MC   Foto Captured  n   n   n   n  n               Manual  2/24 2/26 3/4 3/6 3/11 3/13    2/21   PROM 10' 10' 10' 10' 10' 10'    sj   [facility] (2-3) 5' 5' 5' 5' 5' 5'                                                  Exercise Diary  2/24 2/26 3/4 3/6 3/11 3/13    2/21   Pulleys 5' 5' 5' 5' 5' 5'    5'   Table Slides NV - - - -     10"x10   Pendulums x30 x30 x30 x30 x30 HEP    x30   Wall Ladder 10"x15 10"x15 10"x15 10"x  15 10"x  15 10"x  15    10"x15   Supine Wand Flexions -- - -       D/C   Supine Wand ER 30x 30x 30x x30 x30 x30    30x   Doorway Stretch 4x30" 4x30" 4x30" 4x30" 4x30" 4x30"    30"x4   Bent over rows 30x 15# 45x  15# 45x  15# 45x  15# 45x  15# 45x  15#    3x10 #15   wallpush ups 30x 30x 30x 30x 30x 30x    x25                S/L ER 3# 3x10 3# 3x10 3# 3x10 3# 3x10 4#  3x10 4#  3x10    3#  3x10   S/L flexion -- - -       D/C   theraband row Blue 3x10 Blue 3x10 Blue 3x10 Black  3x10 Black  3x10 Black  3x10    Blue  3x10   theraband extension Blue 3x10 Blue 3x10 Blue 3x10 Black  3x10 Black  3x10 Black  3x10    Blue  3x10   theraband IR,ER Green 2x10 green  3x10 green  3x10 ea Blue  3x10 ea Blue  3x10 ea Blue  3x10 ea    green  3x10   Standing flexion, abduction,scaption 20x ea 30x ea 30x ea 1#  30x ea 1#  30x ea 1#  30x ea    2x10 ea                                          UBE  8'  retro 8'  retro 10' retro 5'/5' 5'/5' 5'/5'    8'  retro       Modalities  2/24 2/26 3/4 3/6 3/11 3/13    2/21   MHP pre PT             CP post PT prn def

## 2020-03-16 ENCOUNTER — OFFICE VISIT (OUTPATIENT)
Dept: PHYSICAL THERAPY | Facility: CLINIC | Age: 70
End: 2020-03-16
Payer: MEDICARE

## 2020-03-16 DIAGNOSIS — M79.622 PAIN IN LEFT UPPER ARM: Primary | ICD-10-CM

## 2020-03-16 PROCEDURE — 97140 MANUAL THERAPY 1/> REGIONS: CPT | Performed by: PHYSICAL THERAPIST

## 2020-03-16 PROCEDURE — 97110 THERAPEUTIC EXERCISES: CPT | Performed by: PHYSICAL THERAPIST

## 2020-03-16 NOTE — PROGRESS NOTES
Daily Note     Today's date: 3/16/2020  Patient name: Naty Herrera  : 1950  MRN: [id number]  Referring provider: Sharla George, *  Dx:   Encounter Diagnosis     ICD-10-CM    1  Pain in left upper arm M79 622                   Subjective: Pt  Notes arm is feeling good and stiffness is slowly letting up  Objective: See treatment diary below      Assessment: Naty Herrera was progressed with PT interventions, focusing on appropriate technique and intensity  Pt  Required moderate cuing from therapist to complete  Pt  Continues to have good progression of AROM/PROM in capsular pattern, though carry over of exercises remains minimal warranting treatment  We are reducing feedback to promote motor learning and task carry over in prep for possible DC in 3 visits  Pt  Would benefit from continued physical therapy to promote improved activity tolerance and function  Plan: Continue per plan of care  Progress treatment as tolerated         Precautions: DMII    Daily Treatment Diary:      Re-Evaluation Date: 20  Compliance    3/   3/6   3/11   3/13  3/16         Visit Number 1 2   3   4   5   5  26         Re-Eval  Y n   n   n   n   n  -      MC   Foto Captured  n   n   n   n   n  -             Manual   3/4 36 3/11 3/13 3/16   2/21   PROM 10' 10' 10' 10' 10' 10' 10'   [facility] 5' 5' 5' 5' 5'                                                 Exercise Diary   3/4 3/6 3/11 3/13 3/16   2/21   Pulleys 5' 5' 5' 5' 5' 5' 5'   5'   Table Slides NV - - - -  -   10"x10   Pendulums x30 x30 x30 x30 x30 HEP -   x30   Wall Ladder 10"x15 10"x15 10"x15 10"x  15 10"x  15 10"x  15 10x15"   10"x15   Supine Wand Flexions -- - -    -   D/C   Supine Wand ER 30x 30x 30x x30 x30 x30 30x   30x   Doorway Stretch 4x30" 4x30" 4x30" 4x30" 4x30" 4x30" 4x30"   30"x4   Bent over rows 30x 15# 45x  15# 45x  15# 45x  15# 45x  15# 45x  15# 30x 20#   3x10 #15   wallpush ups 30x 30x 30x 30x 30x 30x 30x   x25                S/L ER 3# 3x10 3# 3x10 3# 3x10 3# 3x10 4#  3x10 4#  3x10 4# 3x10   3#  3x10   S/L flexion -- - -       D/C   theraband row Blue 3x10 Blue 3x10 Blue 3x10 Black  3x10 Black  3x10 Black  3x10 Black 30x no break   Blue  3x10   theraband extension Blue 3x10 Blue 3x10 Blue 3x10 Black  3x10 Black  3x10 Black  3x10 Black 30x   Blue  3x10   theraband IR,ER Green 2x10 green  3x10 green  3x10 ea Blue  3x10 ea Blue  3x10 ea Blue  3x10 ea @45 degrees 3x10 ea - blue   green  3x10   Standing flexion, abduction,scaption 20x ea 30x ea 30x ea 1#  30x ea 1#  30x ea 1#  30x ea   2# 30x ea   2x10 ea                                          UBE  8'  retro 8'  retro 10' retro 5'/5' 5'/5' 5'/5' 5'/5'   8'  retro       Modalities  2/24 2/26 3/4 3/6 3/11 3/13 3/16   2/21   MHP pre PT       -      CP post PT prn def      -

## 2020-03-18 ENCOUNTER — OFFICE VISIT (OUTPATIENT)
Dept: PHYSICAL THERAPY | Facility: CLINIC | Age: 70
End: 2020-03-18
Payer: MEDICARE

## 2020-03-18 DIAGNOSIS — M79.622 PAIN IN LEFT UPPER ARM: Primary | ICD-10-CM

## 2020-03-18 PROCEDURE — 97140 MANUAL THERAPY 1/> REGIONS: CPT

## 2020-03-18 PROCEDURE — 97110 THERAPEUTIC EXERCISES: CPT

## 2020-03-18 NOTE — PROGRESS NOTES
Daily Note     Today's date: 3/18/2020  Patient name: Melinda Wall  : 1950  MRN: [id number]  Referring provider: Joaquín Velasco, *  Dx:   Encounter Diagnosis     ICD-10-CM    1  Pain in left upper arm M79 622                   Subjective: Patient reports shoulder is doing well and she can do mostly everything she wants to  Objective: See treatment diary below      Assessment: Melinda Wall continues with good progress towards goals  Patient pain appears to be resolving  she required moderate verbal cueing to complete exercises appropriate form and intensity  Patient shoulder strength should improve with continued treatments  Plan: Continue per plan of care        Precautions: DMII    Daily Treatment Diary:      Re-Evaluation Date: 20  Compliance 2/24   2/26   3/4   3/6   3/11   3/13   3/16  3/18       Visit Number 1 2   3   4   5   5   6  7       Re-Eval  Y n   n   n   n   n   -  -    MC   Foto Captured  n   n   n   n   y   -  -           Manual  2/24 2/26 3/4 3/6 3/11 3/13 3/16 3/18  2/21   PROM 10' 10' 10' 10' 10' 10' 10' 10'  sj   GH Mobz Gr (2-3) 5' 5' 5' 5' 5' 5' 5' -                                                Exercise Diary  2/24 2/26 3/4 3/6 3/11 3/13 3/16 3/18  2/21   Pulleys 5' 5' 5' 5' 5' 5' 5' 5'  5'   Table Slides NV - - - -  - -  10"x10   Pendulums x30 x30 x30 x30 x30 HEP - -  x30   Wall Ladder 10"x15 10"x15 10"x15 10"x  15 10"x  15 10"x  15 10x15" 10x15"  10"x15   Supine Wand Flexions -- - -    - -  D/C   Supine Wand ER 30x 30x 30x x30 x30 x30 30x 30x  30x   Doorway Stretch 4x30" 4x30" 4x30" 4x30" 4x30" 4x30" 4x30" 4x30"  30"x4   Bent over rows 30x 15# 45x  15# 45x  15# 45x  15# 45x  15# 45x  15# 30x 20# 30x 20#  3x10 #15   wallpush ups 30x 30x 30x 30x 30x 30x 30x 30x  x25                S/L ER 3# 3x10 3# 3x10 3# 3x10 3# 3x10 4#  3x10 4#  3x10 4# 3x10 4# 3x10  3#  3x10   S/L flexion -- - -       D/C   theraband row Blue 3x10 Blue 3x10 Blue 3x10 Black  3x10 Black  3x10 Black  3x10 Black 30x no break Black 30x no break  Blue  3x10   theraband extension Blue 3x10 Blue 3x10 Blue 3x10 Black  3x10 Black  3x10 Black  3x10 Black 30x Black 30x no break  Blue  3x10   theraband IR,ER Green 2x10 green  3x10 green  3x10 ea Blue  3x10 ea Blue  3x10 ea Blue  3x10 ea @45 degrees 3x10 ea - blue @45 degrees 3x10 ea - blue  green  3x10   Standing flexion, abduction,scaption 20x ea 30x ea 30x ea 1#  30x ea 1#  30x ea 1#  30x ea   2# 30x ea 2# 30x ea  2x10 ea                                          UBE  8'  retro 8'  retro 10' retro 5'/5' 5'/5' 5'/5' 5'/5' 5'/5'  8'  retro       Modalities  2/24 2/26 3/4 3/6 3/11 3/13 3/16 3/18  2/21   MHP pre PT       - -     CP post PT prn def      - -

## 2020-03-20 ENCOUNTER — OFFICE VISIT (OUTPATIENT)
Dept: PHYSICAL THERAPY | Facility: CLINIC | Age: 70
End: 2020-03-20
Payer: MEDICARE

## 2020-03-20 DIAGNOSIS — M79.622 PAIN IN LEFT UPPER ARM: Primary | ICD-10-CM

## 2020-03-20 PROCEDURE — 97110 THERAPEUTIC EXERCISES: CPT

## 2020-03-20 NOTE — PROGRESS NOTES
Daily Note     Today's date: 3/20/2020  Patient name: Vanessa Figueroa  : 1950  MRN: [id number]  Referring provider: Sid Knowles, *  Dx:   Encounter Diagnosis     ICD-10-CM    1  Pain in left upper arm M79 622                   Subjective: Patient reports shoulder feeling good  Using it all she can  Objective: See treatment diary below      Assessment: Vanessa Figueroa has made good  progress towards goals  Patient pain appears to be resolved and she appears to have met all long term and short term goals  she required minimal to no verbal cueing to complete exercises appropriate form and intensity and should do well with D/C to HEP  Patient should D/C next visit as per PT POC  Plan: Potential discharge next visit       Precautions: DMII    Daily Treatment Diary:      Re-Evaluation Date: 20  Compliance    3/4   3/6   3/11   3/13   3/16   3/18  3/20     Visit Number 1 2   3   4   5   5   6   7  8     Re-Eval  Y n   n   n   n   n   -   -    MC   Foto Captured  n   n   n   n   y   -   -           Manual   3/4 3/6 3/11 3/13 3/16 3/18 3/20 2/21   PROM 10' 10' 10' 10' 10' 10' 10' 10' 10' sj   GH Mobz Gr (2-3) 5' 5' 5' 5' 5' 5' 5' -                                                Exercise Diary   3/4 3/6 3/11 3/13 3/16 3/18 3/20 2/21   Pulleys 5' 5' 5' 5' 5' 5' 5' 5' 5' 5'   Table Slides NV - - - -  - - - 10"x10   Pendulums x30 x30 x30 x30 x30 HEP - - - x30   Wall Ladder 10"x15 10"x15 10"x15 10"x  15 10"x  15 10"x  15 10x15" 10x15" 10x15" 10"x15   Supine Wand Flexions -- - -    - - - D/C   Supine Wand ER 30x 30x 30x x30 x30 x30 30x 30x 30x 30x   Doorway Stretch 4x30" 4x30" 4x30" 4x30" 4x30" 4x30" 4x30" 4x30" 4x30" 30"x4   Bent over rows 30x 15# 45x  15# 45x  15# 45x  15# 45x  15# 45x  15# 30x 20# 30x 20# 30x 20# 3x10 #15   wallpush ups 30x 30x 30x 30x 30x 30x 30x 30x x30 x25                S/L ER 3# 3x10 3# 3x10 3# 3x10 3# 3x10 4#  3x10 4#  3x10 4# 3x10 4# 3x10 5#   3#  3x10   S/L flexion -- - -       D/C   theraband row Blue 3x10 Blue 3x10 Blue 3x10 Black  3x10 Black  3x10 Black  3x10 Black 30x no break Black 30x no break Black 30x no break Blue  3x10   theraband extension Blue 3x10 Blue 3x10 Blue 3x10 Black  3x10 Black  3x10 Black  3x10 Black 30x Black 30x no break Black 30x no break Blue  3x10   theraband IR,ER Green 2x10 green  3x10 green  3x10 ea Blue  3x10 ea Blue  3x10 ea Blue  3x10 ea @45 degrees 3x10 ea - blue @45 degrees 3x10 ea - blue @45 degrees 3x10 ea - blue green  3x10   Standing flexion, abduction,scaption 20x ea 30x ea 30x ea 1#  30x ea 1#  30x ea 1#  30x ea   2# 30x ea 2# 30x ea 2# 30x ea 2x10 ea                                          UBE  8'  retro 8'  retro 10' retro 5'/5' 5'/5' 5'/5' 5'/5' 5'/5' 5'/5' 8'  retro       Modalities  2/24 2/26 3/4 3/6 3/11 3/13 3/16 3/18 3/20 2/21   MHP pre PT       - -     CP post PT prn def      - -

## 2020-03-23 ENCOUNTER — OFFICE VISIT (OUTPATIENT)
Dept: PHYSICAL THERAPY | Facility: CLINIC | Age: 70
End: 2020-03-23
Payer: MEDICARE

## 2020-03-23 DIAGNOSIS — M79.622 PAIN IN LEFT UPPER ARM: Primary | ICD-10-CM

## 2020-03-23 PROCEDURE — 97110 THERAPEUTIC EXERCISES: CPT

## 2020-03-23 NOTE — PROGRESS NOTES
Daily Note/Dischaarge Summary     Today's date: 3/23/2020  Patient name: Vandana Albarado  : 1950  MRN: [id number]  Referring provider: Tia Whaley, *  Dx:   Encounter Diagnosis     ICD-10-CM    1  Pain in left upper arm M79 622                   Subjective: Patient reports shoulder is feeling really good  Reports will be working on exercises at home  Objective: See treatment diary below      Assessment: Vandana Albarado has made excellent progress towards goals  All LTG and STG have been met at this time  Horacio Jones has a good understanding of HEP and progression and should do well with D/C to HEP at this time  Plan: D/C patient to HEP as per PT POC with follow up if necessary       Precautions: DMII    Daily Treatment Diary:      Re-Evaluation Date: 20  Compliance    3/4   3/6   3/11   3/13   3/16   3/18   3/20  3/23   Visit Number 1 2   3   4   5   5   6   7   8  D/C   Re-Eval  Y n   n   n   n   n   -   -  -  -   Foto Captured  n   n   n   n   y   -   -  - -       Manual   3/4 3/6 3/11 3/13 3/16 3/18 3/20 3/23   PROM 10' 10' 10' 10' 10' 10' 10' 10' 10' -    [facility]' [facility]' 5' 5' 5' 5' -                                                Exercise Diary   3/4 3/6 3/11 3/13 3/16 3/18 3/20 3/23   Pulleys 5' 5' 5' 5' 5' 5' 5' 5' 5' 5'   Table Slides NV - - - -  - - - -   Pendulums x30 x30 x30 x30 x30 HEP - - - -   Wall Ladder 10"x15 10"x15 10"x15 10"x  15 10"x  15 10"x  15 10x15" 10x15" 10x15" 10x15"   Supine Wand Flexions -- - -    - - - -   Supine Wand ER 30x 30x 30x x30 x30 x30 30x 30x 30x 30x   Doorway Stretch 4x30" 4x30" 4x30" 4x30" 4x30" 4x30" 4x30" 4x30" 4x30" 4x30"   Bent over rows 30x 15# 45x  15# 45x  15# 45x  15# 45x  15# 45x  15# 30x 20# 30x 20# 30x 20# 30x 20#   wallpush ups 30x 30x 30x 30x 30x 30x 30x 30x x30 x30                S/L ER 3# 3x10 3# 3x10 3# 3x10 3# 3x10 4#  3x10 4#  3x10 4# 3x10 4# 3x10 5#  3x10 5#  3x10   S/L flexion -- - - theraband row Blue 3x10 Blue 3x10 Blue 3x10 Black  3x10 Black  3x10 Black  3x10 Black 30x no break Black 30x no break Black 30x no break Black 30x no break   theraband extension Blue 3x10 Blue 3x10 Blue 3x10 Black  3x10 Black  3x10 Black  3x10 Black 30x Black 30x no break Black 30x no break Black 30x no break   theraband IR,ER Green 2x10 green  3x10 green  3x10 ea Blue  3x10 ea Blue  3x10 ea Blue  3x10 ea @45 degrees 3x10 ea - blue @45 degrees 3x10 ea - blue @45 degrees 3x10 ea - blue @45 degrees 3x10 ea - blue   Standing flexion, abduction,scaption 20x ea 30x ea 30x ea 1#  30x ea 1#  30x ea 1#  30x ea   2# 30x ea 2# 30x ea 2# 30x ea 2# 30x ea                                          UBE  8'  retro 8'  retro 10' retro 5'/5' 5'/5' 5'/5' 5'/5' 5'/5' 5'/5' 5'/5'       Modalities  2/24 2/26 3/4 3/6 3/11 3/13 3/16 3/18 3/20 3/23   MHP pre PT       - - - -   CP post PT prn def      - - - -

## 2020-03-25 ENCOUNTER — APPOINTMENT (OUTPATIENT)
Dept: PHYSICAL THERAPY | Facility: CLINIC | Age: 70
End: 2020-03-25
Payer: MEDICARE

## 2020-03-27 ENCOUNTER — APPOINTMENT (OUTPATIENT)
Dept: PHYSICAL THERAPY | Facility: CLINIC | Age: 70
End: 2020-03-27
Payer: MEDICARE

## 2020-03-30 ENCOUNTER — APPOINTMENT (OUTPATIENT)
Dept: PHYSICAL THERAPY | Facility: CLINIC | Age: 70
End: 2020-03-30
Payer: MEDICARE

## 2020-04-06 ENCOUNTER — TELEPHONE (OUTPATIENT)
Dept: FAMILY MEDICINE CLINIC | Facility: CLINIC | Age: 70
End: 2020-04-06

## 2020-04-07 DIAGNOSIS — Z12.39 BREAST CANCER SCREENING: Primary | ICD-10-CM

## 2020-04-14 ENCOUNTER — OFFICE VISIT (OUTPATIENT)
Dept: FAMILY MEDICINE CLINIC | Facility: CLINIC | Age: 70
End: 2020-04-14
Payer: MEDICARE

## 2020-04-14 ENCOUNTER — TRANSCRIBE ORDERS (OUTPATIENT)
Dept: ADMINISTRATIVE | Facility: HOSPITAL | Age: 70
End: 2020-04-14

## 2020-04-14 VITALS
HEART RATE: 73 BPM | SYSTOLIC BLOOD PRESSURE: 126 MMHG | HEIGHT: 64 IN | WEIGHT: 181.22 LBS | OXYGEN SATURATION: 98 % | DIASTOLIC BLOOD PRESSURE: 72 MMHG | BODY MASS INDEX: 30.94 KG/M2

## 2020-04-14 DIAGNOSIS — E78.49 OTHER HYPERLIPIDEMIA: ICD-10-CM

## 2020-04-14 DIAGNOSIS — G40.909 SEIZURE DISORDER (HCC): ICD-10-CM

## 2020-04-14 DIAGNOSIS — E11.65 TYPE 2 DIABETES MELLITUS WITH HYPERGLYCEMIA, WITHOUT LONG-TERM CURRENT USE OF INSULIN (HCC): Primary | ICD-10-CM

## 2020-04-14 DIAGNOSIS — K21.9 GASTROESOPHAGEAL REFLUX DISEASE WITHOUT ESOPHAGITIS: ICD-10-CM

## 2020-04-14 DIAGNOSIS — Z12.31 VISIT FOR SCREENING MAMMOGRAM: Primary | ICD-10-CM

## 2020-04-14 DIAGNOSIS — F33.0 MILD EPISODE OF RECURRENT MAJOR DEPRESSIVE DISORDER (HCC): ICD-10-CM

## 2020-04-14 DIAGNOSIS — I10 ESSENTIAL HYPERTENSION: ICD-10-CM

## 2020-04-14 LAB — SL AMB POCT HEMOGLOBIN AIC: 6.2 (ref ?–6.5)

## 2020-04-14 PROCEDURE — 3044F HG A1C LEVEL LT 7.0%: CPT | Performed by: NURSE PRACTITIONER

## 2020-04-14 PROCEDURE — 1160F RVW MEDS BY RX/DR IN RCRD: CPT | Performed by: NURSE PRACTITIONER

## 2020-04-14 PROCEDURE — 83036 HEMOGLOBIN GLYCOSYLATED A1C: CPT | Performed by: NURSE PRACTITIONER

## 2020-04-14 PROCEDURE — 99214 OFFICE O/P EST MOD 30 MIN: CPT | Performed by: NURSE PRACTITIONER

## 2020-04-14 PROCEDURE — 3008F BODY MASS INDEX DOCD: CPT | Performed by: NURSE PRACTITIONER

## 2020-04-14 PROCEDURE — 3074F SYST BP LT 130 MM HG: CPT | Performed by: NURSE PRACTITIONER

## 2020-04-14 PROCEDURE — 2022F DILAT RTA XM EVC RTNOPTHY: CPT | Performed by: NURSE PRACTITIONER

## 2020-04-14 PROCEDURE — 1036F TOBACCO NON-USER: CPT | Performed by: NURSE PRACTITIONER

## 2020-04-14 PROCEDURE — 4040F PNEUMOC VAC/ADMIN/RCVD: CPT | Performed by: NURSE PRACTITIONER

## 2020-04-14 PROCEDURE — 3078F DIAST BP <80 MM HG: CPT | Performed by: NURSE PRACTITIONER

## 2020-05-18 DIAGNOSIS — E11.9 TYPE 2 DIABETES MELLITUS WITHOUT COMPLICATION, WITHOUT LONG-TERM CURRENT USE OF INSULIN (HCC): ICD-10-CM

## 2020-05-18 RX ORDER — METFORMIN HYDROCHLORIDE 500 MG/1
500 TABLET, EXTENDED RELEASE ORAL
Qty: 90 TABLET | Refills: 1 | Status: SHIPPED | OUTPATIENT
Start: 2020-05-18 | End: 2020-05-22 | Stop reason: SDUPTHER

## 2020-05-22 DIAGNOSIS — I10 ESSENTIAL HYPERTENSION: ICD-10-CM

## 2020-05-22 DIAGNOSIS — R05.9 COUGH: ICD-10-CM

## 2020-05-22 DIAGNOSIS — E11.9 TYPE 2 DIABETES MELLITUS WITHOUT COMPLICATION, WITHOUT LONG-TERM CURRENT USE OF INSULIN (HCC): ICD-10-CM

## 2020-05-22 DIAGNOSIS — M79.622 PAIN IN LEFT UPPER ARM: ICD-10-CM

## 2020-05-22 RX ORDER — METFORMIN HYDROCHLORIDE 500 MG/1
500 TABLET, EXTENDED RELEASE ORAL
Qty: 90 TABLET | Refills: 1 | Status: SHIPPED | OUTPATIENT
Start: 2020-05-22 | End: 2020-07-15 | Stop reason: SDUPTHER

## 2020-05-22 RX ORDER — AMLODIPINE BESYLATE 2.5 MG/1
5 TABLET ORAL DAILY
Qty: 180 TABLET | Refills: 0 | Status: SHIPPED | OUTPATIENT
Start: 2020-05-22 | End: 2020-07-15 | Stop reason: SDUPTHER

## 2020-07-15 ENCOUNTER — OFFICE VISIT (OUTPATIENT)
Dept: FAMILY MEDICINE CLINIC | Facility: CLINIC | Age: 70
End: 2020-07-15
Payer: MEDICARE

## 2020-07-15 VITALS
WEIGHT: 183.8 LBS | BODY MASS INDEX: 31.38 KG/M2 | HEART RATE: 77 BPM | TEMPERATURE: 97.3 F | HEIGHT: 64 IN | OXYGEN SATURATION: 97 % | SYSTOLIC BLOOD PRESSURE: 134 MMHG | DIASTOLIC BLOOD PRESSURE: 76 MMHG

## 2020-07-15 DIAGNOSIS — I10 ESSENTIAL HYPERTENSION: ICD-10-CM

## 2020-07-15 DIAGNOSIS — R05.9 COUGH: ICD-10-CM

## 2020-07-15 DIAGNOSIS — Z78.0 MENOPAUSE: ICD-10-CM

## 2020-07-15 DIAGNOSIS — M79.622 PAIN IN LEFT UPPER ARM: ICD-10-CM

## 2020-07-15 DIAGNOSIS — E11.65 TYPE 2 DIABETES MELLITUS WITH HYPERGLYCEMIA, WITHOUT LONG-TERM CURRENT USE OF INSULIN (HCC): Primary | ICD-10-CM

## 2020-07-15 DIAGNOSIS — E78.49 OTHER HYPERLIPIDEMIA: ICD-10-CM

## 2020-07-15 PROBLEM — G40.909 SEIZURE DISORDER (HCC): Status: RESOLVED | Noted: 2019-03-26 | Resolved: 2020-07-15

## 2020-07-15 PROBLEM — D43.2: Status: RESOLVED | Noted: 2019-03-26 | Resolved: 2020-07-15

## 2020-07-15 PROCEDURE — 1125F AMNT PAIN NOTED PAIN PRSNT: CPT | Performed by: NURSE PRACTITIONER

## 2020-07-15 PROCEDURE — 1036F TOBACCO NON-USER: CPT | Performed by: NURSE PRACTITIONER

## 2020-07-15 PROCEDURE — 3078F DIAST BP <80 MM HG: CPT | Performed by: NURSE PRACTITIONER

## 2020-07-15 PROCEDURE — 3075F SYST BP GE 130 - 139MM HG: CPT | Performed by: NURSE PRACTITIONER

## 2020-07-15 PROCEDURE — 2022F DILAT RTA XM EVC RTNOPTHY: CPT | Performed by: NURSE PRACTITIONER

## 2020-07-15 PROCEDURE — 1170F FXNL STATUS ASSESSED: CPT | Performed by: NURSE PRACTITIONER

## 2020-07-15 PROCEDURE — 4010F ACE/ARB THERAPY RXD/TAKEN: CPT | Performed by: NURSE PRACTITIONER

## 2020-07-15 PROCEDURE — 1123F ACP DISCUSS/DSCN MKR DOCD: CPT | Performed by: NURSE PRACTITIONER

## 2020-07-15 PROCEDURE — 3008F BODY MASS INDEX DOCD: CPT | Performed by: NURSE PRACTITIONER

## 2020-07-15 PROCEDURE — 3044F HG A1C LEVEL LT 7.0%: CPT | Performed by: NURSE PRACTITIONER

## 2020-07-15 PROCEDURE — 1160F RVW MEDS BY RX/DR IN RCRD: CPT | Performed by: NURSE PRACTITIONER

## 2020-07-15 PROCEDURE — G0439 PPPS, SUBSEQ VISIT: HCPCS | Performed by: NURSE PRACTITIONER

## 2020-07-15 PROCEDURE — 4040F PNEUMOC VAC/ADMIN/RCVD: CPT | Performed by: NURSE PRACTITIONER

## 2020-07-15 RX ORDER — AMLODIPINE BESYLATE 2.5 MG/1
5 TABLET ORAL DAILY
Qty: 180 TABLET | Refills: 1 | Status: SHIPPED | OUTPATIENT
Start: 2020-07-15 | End: 2020-11-25

## 2020-07-15 RX ORDER — ATORVASTATIN CALCIUM 20 MG/1
20 TABLET, FILM COATED ORAL
Qty: 90 TABLET | Refills: 1 | Status: SHIPPED | OUTPATIENT
Start: 2020-07-15 | End: 2020-11-25

## 2020-07-15 RX ORDER — METFORMIN HYDROCHLORIDE 500 MG/1
500 TABLET, EXTENDED RELEASE ORAL
Qty: 90 TABLET | Refills: 1 | Status: SHIPPED | OUTPATIENT
Start: 2020-07-15 | End: 2020-11-25

## 2020-07-15 NOTE — PATIENT INSTRUCTIONS
Medicare Preventive Visit Patient Instructions  Thank you for completing your Welcome to Medicare Visit or Medicare Annual Wellness Visit today  Your next wellness visit will be due in one year (7/15/2021)  The screening/preventive services that you may require over the next 5-10 years are detailed below  Some tests may not apply to you based off risk factors and/or age  Screening tests ordered at today's visit but not completed yet may show as past due  Also, please note that scanned in results may not display below  Preventive Screenings:  Service Recommendations Previous Testing/Comments   Colorectal Cancer Screening  * Colonoscopy    * Fecal Occult Blood Test (FOBT)/Fecal Immunochemical Test (FIT)  * Fecal DNA/Cologuard Test  * Flexible Sigmoidoscopy Age: 54-65 years old   Colonoscopy: every 10 years (may be performed more frequently if at higher risk)  OR  FOBT/FIT: every 1 year  OR  Cologuard: every 3 years  OR  Sigmoidoscopy: every 5 years  Screening may be recommended earlier than age 48 if at higher risk for colorectal cancer  Also, an individualized decision between you and your healthcare provider will decide whether screening between the ages of 74-80 would be appropriate  Colonoscopy: 09/04/2009  FOBT/FIT: 10/08/2019  Cologuard: Not on file  Sigmoidoscopy: Not on file    Screening Current     Breast Cancer Screening Age: 36 years old  Frequency: every 1-2 years  Not required if history of left and right mastectomy Mammogram: 11/12/2019    Screening Current   Cervical Cancer Screening Between the ages of 21-29, pap smear recommended once every 3 years  Between the ages of 33-67, can perform pap smear with HPV co-testing every 5 years     Recommendations may differ for women with a history of total hysterectomy, cervical cancer, or abnormal pap smears in past  Pap Smear: Not on file    Screening Not Indicated   Hepatitis C Screening Once for adults born between 1945 and 1965  More frequently in patients at high risk for Hepatitis C Hep C Antibody: 01/08/2020    Screening Current   Diabetes Screening 1-2 times per year if you're at risk for diabetes or have pre-diabetes Fasting glucose: 108 mg/dL   A1C: 6 2    Screening Not Indicated  History Diabetes   Cholesterol Screening Once every 5 years if you don't have a lipid disorder  May order more often based on risk factors  Lipid panel: 01/08/2020    Screening Not Indicated  History Lipid Disorder     Other Preventive Screenings Covered by Medicare:  1  Abdominal Aortic Aneurysm (AAA) Screening: covered once if your at risk  You're considered to be at risk if you have a family history of AAA  2  Lung Cancer Screening: covers low dose CT scan once per year if you meet all of the following conditions: (1) Age 50-69; (2) No signs or symptoms of lung cancer; (3) Current smoker or have quit smoking within the last 15 years; (4) You have a tobacco smoking history of at least 30 pack years (packs per day multiplied by number of years you smoked); (5) You get a written order from a healthcare provider  3  Glaucoma Screening: covered annually if you're considered high risk: (1) You have diabetes OR (2) Family history of glaucoma OR (3)  aged 48 and older OR (3)  American aged 72 and older  3  Osteoporosis Screening: covered every 2 years if you meet one of the following conditions: (1) You're estrogen deficient and at risk for osteoporosis based off medical history and other findings; (2) Have a vertebral abnormality; (3) On glucocorticoid therapy for more than 3 months; (4) Have primary hyperparathyroidism; (5) On osteoporosis medications and need to assess response to drug therapy  · Last bone density test (DXA Scan): 04/02/2018  5  HIV Screening: covered annually if you're between the age of 12-76  Also covered annually if you are younger than 13 and older than 72 with risk factors for HIV infection   For pregnant patients, it is covered up to 3 times per pregnancy  Immunizations:  Immunization Recommendations   Influenza Vaccine Annual influenza vaccination during flu season is recommended for all persons aged >= 6 months who do not have contraindications   Pneumococcal Vaccine (Prevnar and Pneumovax)  * Prevnar = PCV13  * Pneumovax = PPSV23   Adults 25-60 years old: 1-3 doses may be recommended based on certain risk factors  Adults 72 years old: Prevnar (PCV13) vaccine recommended followed by Pneumovax (PPSV23) vaccine  If already received PPSV23 since turning 65, then PCV13 recommended at least one year after PPSV23 dose  Hepatitis B Vaccine 3 dose series if at intermediate or high risk (ex: diabetes, end stage renal disease, liver disease)   Tetanus (Td) Vaccine - COST NOT COVERED BY MEDICARE PART B Following completion of primary series, a booster dose should be given every 10 years to maintain immunity against tetanus  Td may also be given as tetanus wound prophylaxis  Tdap Vaccine - COST NOT COVERED BY MEDICARE PART B Recommended at least once for all adults  For pregnant patients, recommended with each pregnancy  Shingles Vaccine (Shingrix) - COST NOT COVERED BY MEDICARE PART B  2 shot series recommended in those aged 48 and above     Health Maintenance Due:      Topic Date Due    Cervical Cancer Screening  05/31/1973    DXA SCAN  04/02/2020    CRC Screening: FOBTx3/FIT  10/08/2020    MAMMOGRAM  11/12/2020    Hepatitis C Screening  Completed     Immunizations Due:      Topic Date Due    DTaP,Tdap,and Td Vaccines (1 - Tdap) 05/31/1963    Pneumococcal Vaccine: 65+ Years (2 of 2 - PPSV23) 09/26/2018     Advance Directives   What are advance directives? Advance directives are legal documents that state your wishes and plans for medical care  These plans are made ahead of time in case you lose your ability to make decisions for yourself   Advance directives can apply to any medical decision, such as the treatments you want, and if you want to donate organs  What are the types of advance directives? There are many types of advance directives, and each state has rules about how to use them  You may choose a combination of any of the following:  · Living will: This is a written record of the treatment you want  You can also choose which treatments you do not want, which to limit, and which to stop at a certain time  This includes surgery, medicine, IV fluid, and tube feedings  · Durable power of  for healthcare [facility]): This is a written record that states who you want to make healthcare choices for you when you are unable to make them for yourself  This person, called a proxy, is usually a family member or a friend  You may choose more than 1 proxy  · Do not resuscitate (DNR) order:  A DNR order is used in case your heart stops beating or you stop breathing  It is a request not to have certain forms of treatment, such as CPR  A DNR order may be included in other types of advance directives  · Medical directive: This covers the care that you want if you are in a coma, near death, or unable to make decisions for yourself  You can list the treatments you want for each condition  Treatment may include pain medicine, surgery, blood transfusions, dialysis, IV or tube feedings, and a ventilator (breathing machine)  · Values history: This document has questions about your views, beliefs, and how you feel and think about life  This information can help others choose the care that you would choose  Why are advance directives important? An advance directive helps you control your care  Although spoken wishes may be used, it is better to have your wishes written down  Spoken wishes can be misunderstood, or not followed  Treatments may be given even if you do not want them  An advance directive may make it easier for your family to make difficult choices about your care     Weight Management   Why it is important to manage your weight:  Being overweight increases your risk of health conditions such as heart disease, high blood pressure, type 2 diabetes, and certain types of cancer  It can also increase your risk for osteoarthritis, sleep apnea, and other respiratory problems  Aim for a slow, steady weight loss  Even a small amount of weight loss can lower your risk of health problems  How to lose weight safely:  A safe and healthy way to lose weight is to eat fewer calories and get regular exercise  You can lose up about 1 pound a week by decreasing the number of calories you eat by 500 calories each day  Healthy meal plan for weight management:  A healthy meal plan includes a variety of foods, contains fewer calories, and helps you stay healthy  A healthy meal plan includes the following:  · Eat whole-grain foods more often  A healthy meal plan should contain fiber  Fiber is the part of grains, fruits, and vegetables that is not broken down by your body  Whole-grain foods are healthy and provide extra fiber in your diet  Some examples of whole-grain foods are whole-wheat breads and pastas, oatmeal, brown rice, and bulgur  · Eat a variety of vegetables every day  Include dark, leafy greens such as spinach, kale, chong greens, and mustard greens  Eat yellow and orange vegetables such as carrots, sweet potatoes, and winter squash  · Eat a variety of fruits every day  Choose fresh or canned fruit (canned in its own juice or light syrup) instead of juice  Fruit juice has very little or no fiber  · Eat low-fat dairy foods  Drink fat-free (skim) milk or 1% milk  Eat fat-free yogurt and low-fat cottage cheese  Try low-fat cheeses such as mozzarella and other reduced-fat cheeses  · Choose meat and other protein foods that are low in fat  Choose beans or other legumes such as split peas or lentils  Choose fish, skinless poultry (chicken or turkey), or lean cuts of red meat (beef or pork)   Before you cook meat or poultry, cut off any visible fat  · Use less fat and oil  Try baking foods instead of frying them  Add less fat, such as margarine, sour cream, regular salad dressing and mayonnaise to foods  Eat fewer high-fat foods  Some examples of high-fat foods include french fries, doughnuts, ice cream, and cakes  · Eat fewer sweets  Limit foods and drinks that are high in sugar  This includes candy, cookies, regular soda, and sweetened drinks  Exercise:  Exercise at least 30 minutes per day on most days of the week  Some examples of exercise include walking, biking, dancing, and swimming  You can also fit in more physical activity by taking the stairs instead of the elevator or parking farther away from stores  Ask your healthcare provider about the best exercise plan for you  © Copyright [employer] 2018 Information is for End User's use only and may not be sold, redistributed or otherwise used for commercial purposes   All illustrations and images included in CareNotes® are the copyrighted property of [employer]  or [address] [place] [address]

## 2020-07-15 NOTE — PROGRESS NOTES
Chelly Rodriguez is here for her Subsequent Wellness visit  Last Medicare Wellness visit information reviewed, patient interviewed and updates made to the record today  Health Risk Assessment:   Patient rates overall health as very good  Patient feels that their physical health rating is same  Eyesight was rated as same  Hearing was rated as same  Patient feels that their emotional and mental health rating is same  Pain experienced in the last 7 days has been none  Patient states that she has experienced no weight loss or gain in last 6 months  Fall Risk Screening: In the past year, patient has experienced: no history of falling in past year      Urinary Incontinence Screening:   Patient has not leaked urine accidently in the last six months  Home Safety:  Patient does not have trouble with stairs inside or outside of their home  Patient has working smoke alarms and has working carbon monoxide detector  Home safety hazards include: none  Nutrition:   Current diet is Regular  Medications:   Patient is currently taking over-the-counter supplements  OTC medications include: see medication list  Patient is able to manage medications  Activities of Daily Living (ADLs)/Instrumental Activities of Daily Living (IADLs):   Walk and transfer into and out of bed and chair?: Yes  Dress and groom yourself?: Yes    Bathe or shower yourself?: Yes    Feed yourself?  Yes  Do your laundry/housekeeping?: Yes  Manage your money, pay your bills and track your expenses?: Yes  Make your own meals?: Yes    Do your own shopping?: Yes    Previous Hospitalizations:   Any hospitalizations or ED visits within the last 12 months?: No      Advance Care Planning:   Living will: No    Durable POA for healthcare: No    Five wishes given: Yes      Cognitive Screening:   Provider or family/friend/caregiver concerned regarding cognition?: No    PREVENTIVE SCREENINGS      Cardiovascular Screening:    General: Screening Not Indicated and History Lipid Disorder      Diabetes Screening:     General: Screening Not Indicated and History Diabetes      Colorectal Cancer Screening:     General: Screening Current      Breast Cancer Screening:     General: Screening Current      Cervical Cancer Screening:    General: Screening Not Indicated      Lung Cancer Screening:     General: Screening Not Indicated      Hepatitis C Screening:    General: Screening Current

## 2020-07-15 NOTE — PROGRESS NOTES
Assessment and Plan:     Problem List Items Addressed This Visit        Endocrine    Type 2 diabetes mellitus with hyperglycemia, without long-term current use of insulin ([address] ) - Primary       Cardiovascular and Mediastinum    Hypertension       Other    Hyperlipidemia      Other Visit Diagnoses     Cough        Pain in left upper arm        Menopause        BMI 31 0-31 9,adult               Preventive health issues were discussed with patient, and age appropriate screening tests were ordered as noted in patient's After Visit Summary  Personalized health advice and appropriate referrals for health education or preventive services given if needed, as noted in patient's After Visit Summary       History of Present Illness:     Patient presents for Medicare Annual Wellness visit    Patient Care Team:  Leanne Jaime as PCP - General (Family Medicine)     Problem List:     Patient Active Problem List   Diagnosis    Esophageal reflux    Type 2 diabetes mellitus with hyperglycemia, without long-term current use of insulin ([address] )    Migraine    Hypertension    Sciatica    Sjogren's syndrome ([address] )    Hyperlipidemia    Depression    Seizure disorder ([address] )    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[address] )    Sicca syndrome ([address] )    Severe sepsis ([address] )    S/P craniotomy    Mild episode of recurrent major depressive disorder ([address] )      Past Medical and Surgical History:     Past Medical History:   Diagnosis Date    Cervicalgia     Depression     Diabetes mellitus ([address] )     Esophageal reflux     Hyperlipidemia     Hypertension     Essential    Migraine     Nonspecific abnormal electrocardiogram (ECG)     Sciatica     Seizure disorder ([address] )     Sjogren's syndrome ([address] )      Past Surgical History:   Procedure Laterality Date    CERVICAL POLYPECTOMY      CRANIOTOMY       Left frontal craniotomy for resection of tumor-Dr [address]     TUBAL LIGATION        Family History:     Family History   Problem Relation Age of Onset    Heart disease Brother     Heart disease Father     Diabetes Maternal Aunt     Diabetes Maternal Grandmother     Aneurysm Mother     Lupus Sister     Thyroid cancer Sister         age unknown    No Known Problems Sister     No Known Problems Sister     No Known Problems Maternal Aunt     No Known Problems Maternal Aunt     No Known Problems Paternal Aunt       Social History:        Social History     Socioeconomic History    Marital status: /Civil Union     Spouse name: None    Number of children: None    Years of education: None    Highest education level: None   Occupational History    None   Social Needs    Financial resource strain: None    Food insecurity:     Worry: None     Inability: None    Transportation needs:     Medical: None     Non-medical: None   Tobacco Use    Smoking status: Never Smoker    Smokeless tobacco: Never Used   Substance and Sexual Activity    Alcohol use: Never     Frequency: Never    Drug use: Never    Sexual activity: None   Lifestyle    Physical activity:     Days per week: None     Minutes per session: None    Stress: None   Relationships    Social connections:     Talks on phone: None     Gets together: None     Attends Hindu service: None     Active member of club or organization: None     Attends meetings of clubs or organizations: None     Relationship status: None    Intimate partner violence:     Fear of current or ex partner: None     Emotionally abused: None     Physically abused: None     Forced sexual activity: None   Other Topics Concern    None   Social History Narrative    None      Medications and Allergies:     Current Outpatient Medications   Medication Sig Dispense Refill    amLODIPine (NORVASC) 2 5 mg tablet Take 2 tablets (5 mg total) by mouth daily 180 tablet 0    ASPIRIN 81 PO Take 81 mg by mouth daily      atorvastatin (LIPITOR) 20 mg tablet TAKE 1 TABLET AT BEDTIME 90 tablet 0    escitalopram (LEXAPRO) 20 mg tablet Take 20 mg by mouth daily      estrogen, conjugated,-medroxyprogesterone (PREMPRO) 0 625-5 MG per tablet Take 1 tablet by mouth daily 90 tablet 0    lansoprazole (PREVACID) 15 mg capsule Take 15 mg by mouth daily      levETIRAcetam (KEPPRA) 750 mg tablet Take 1 tablet (750 mg total) by mouth 2 (two) times a day 60 tablet 0    metFORMIN (GLUCOPHAGE-XR) 500 mg 24 hr tablet Take 1 tablet (500 mg total) by mouth daily with breakfast 90 tablet 1    Multiple Vitamins-Minerals (MULTIVITAMIN WITH MINERALS) tablet Take 1 tablet by mouth daily      topiramate (TOPAMAX) 200 MG tablet Take 200 mg by mouth 2 (two) times a day      traZODone (DESYREL) 100 mg tablet Take 100 mg by mouth daily at bedtime      venlafaxine (EFFEXOR-XR) 75 mg 24 hr capsule Take 75 mg by mouth daily      diclofenac sodium (VOLTAREN) 1 % Apply 1 application topically Three times daily as needed       No current facility-administered medications for this visit        Allergies   Allergen Reactions    Lamotrigine      Other reaction(s): LAMOTRIGINE (LAMICTAL) rash      Immunizations:     Immunization History   Administered Date(s) Administered     Influenza (IM) Preservative Free 10/04/2015    INFLUENZA 11/02/2005, 10/08/2008, 09/22/2009, 10/07/2010, 10/10/2012, 09/24/2013, 10/07/2016, 10/12/2016, 09/26/2017, 09/17/2018    Influenza Split High Dose Preservative Free IM 09/26/2017, 09/17/2018    Influenza, high dose seasonal 0 5 mL 10/04/2019    Pneumococcal Conjugate 13-Valent 09/26/2017      Health Maintenance:         Topic Date Due    Cervical Cancer Screening  05/31/1971    DXA SCAN  04/02/2020    CRC Screening: FOBTx3/FIT  10/08/2020    MAMMOGRAM  11/12/2020    Hepatitis C Screening  Completed         Topic Date Due    DTaP,Tdap,and Td Vaccines (1 - Tdap) 05/31/1963    Pneumococcal Vaccine: 65+ Years (2 of 2 - PPSV23) 09/26/2018      Medicare Health Risk Assessment:     /76 (BP Location: Left arm, Patient Position: Sitting, Cuff Size: Standard)   Pulse 77   Temp (!) 97 3 °F (36 3 °C)   Ht 5' 4" (1 626 m)   Wt 83 4 kg (183 lb 12 8 oz)   SpO2 97%   BMI 31 55 kg/m²      Ana Maria Sims is here for her Subsequent Wellness visit  PREVENTIVE SCREENINGS      Cardiovascular Screening:    General: Screening Not Indicated and History Lipid Disorder      Diabetes Screening:     General: Screening Not Indicated and History Diabetes      Colorectal Cancer Screening:     General: Screening Current      Breast Cancer Screening:     General: Screening Current      Cervical Cancer Screening:    General: Screening Not Indicated      Osteoporosis Screening:    General: Screening Not Indicated and Risks and Benefits Discussed      Abdominal Aortic Aneurysm (AAA) Screening:        General: Screening Not Indicated      Lung Cancer Screening:     General: Screening Not Indicated      Hepatitis C Screening:    General: Screening Current      Preventive Screening Comments: She is very physically active, gardening, mowing, painting etc   Her last DEXA scan in 2018 indicated osteopenia and she has remained active and takes a multivitamin and calcium supplement  Lipid panel and CMP to be drawn before next visit  Other Counseling Topics:   Calcium and vitamin D intake and regular weightbearing exercise         Margo Leisure

## 2020-07-22 ENCOUNTER — HOSPITAL ENCOUNTER (OUTPATIENT)
Dept: RADIOLOGY | Facility: CLINIC | Age: 70
Discharge: HOME | End: 2020-07-22
Payer: MEDICARE

## 2020-07-22 VITALS — HEIGHT: 64 IN | WEIGHT: 183 LBS | BODY MASS INDEX: 31.24 KG/M2

## 2020-07-22 DIAGNOSIS — Z12.31 VISIT FOR SCREENING MAMMOGRAM: ICD-10-CM

## 2020-07-22 PROCEDURE — 77063 BREAST TOMOSYNTHESIS BI: CPT

## 2020-07-22 PROCEDURE — 77067 SCR MAMMO BI INCL CAD: CPT

## 2020-07-30 ENCOUNTER — TELEPHONE (OUTPATIENT)
Dept: FAMILY MEDICINE CLINIC | Facility: CLINIC | Age: 70
End: 2020-07-30

## 2020-07-30 DIAGNOSIS — L23.7 CONTACT DERMATITIS DUE TO POISON IVY: Primary | ICD-10-CM

## 2020-07-30 RX ORDER — METHYLPREDNISOLONE 4 MG/1
TABLET ORAL
Qty: 21 EACH | Refills: 0 | Status: SHIPPED | OUTPATIENT
Start: 2020-07-30 | End: 2020-08-17

## 2020-07-30 NOTE — TELEPHONE ENCOUNTER
Please send over to [facility]

## 2020-07-30 NOTE — TELEPHONE ENCOUNTER
Since I just saw her she does not need to come in, but she will need to take prednisone for a few days because of the poison being on her face  I will send it to the pharmacy - can we just verify where it should go  Thanks

## 2020-07-30 NOTE — TELEPHONE ENCOUNTER
Patients  called and stated that Ana Maria Sims has poison ivy all over her face and under her eye   He would like to know if they can get something to help it or does she need to come in for an appointment

## 2020-08-17 ENCOUNTER — OFFICE VISIT (OUTPATIENT)
Dept: FAMILY MEDICINE CLINIC | Facility: CLINIC | Age: 70
End: 2020-08-17
Payer: MEDICARE

## 2020-08-17 VITALS
BODY MASS INDEX: 31.55 KG/M2 | HEART RATE: 95 BPM | OXYGEN SATURATION: 98 % | WEIGHT: 184.8 LBS | HEIGHT: 64 IN | SYSTOLIC BLOOD PRESSURE: 134 MMHG | DIASTOLIC BLOOD PRESSURE: 76 MMHG | TEMPERATURE: 97.3 F

## 2020-08-17 DIAGNOSIS — T78.40XA ALLERGIC REACTION, INITIAL ENCOUNTER: ICD-10-CM

## 2020-08-17 DIAGNOSIS — L23.9 ALLERGIC DERMATITIS: Primary | ICD-10-CM

## 2020-08-17 PROCEDURE — 2022F DILAT RTA XM EVC RTNOPTHY: CPT | Performed by: NURSE PRACTITIONER

## 2020-08-17 PROCEDURE — 1160F RVW MEDS BY RX/DR IN RCRD: CPT | Performed by: NURSE PRACTITIONER

## 2020-08-17 PROCEDURE — 3078F DIAST BP <80 MM HG: CPT | Performed by: NURSE PRACTITIONER

## 2020-08-17 PROCEDURE — 3008F BODY MASS INDEX DOCD: CPT | Performed by: NURSE PRACTITIONER

## 2020-08-17 PROCEDURE — 3044F HG A1C LEVEL LT 7.0%: CPT | Performed by: NURSE PRACTITIONER

## 2020-08-17 PROCEDURE — 4040F PNEUMOC VAC/ADMIN/RCVD: CPT | Performed by: NURSE PRACTITIONER

## 2020-08-17 PROCEDURE — 1036F TOBACCO NON-USER: CPT | Performed by: NURSE PRACTITIONER

## 2020-08-17 PROCEDURE — 3075F SYST BP GE 130 - 139MM HG: CPT | Performed by: NURSE PRACTITIONER

## 2020-08-17 PROCEDURE — 99213 OFFICE O/P EST LOW 20 MIN: CPT | Performed by: NURSE PRACTITIONER

## 2020-08-17 RX ORDER — METHYLPREDNISOLONE 4 MG/1
TABLET ORAL
Qty: 21 EACH | Refills: 0 | Status: SHIPPED | OUTPATIENT
Start: 2020-08-17 | End: 2020-09-23

## 2020-09-16 ENCOUNTER — OFFICE VISIT (OUTPATIENT)
Dept: FAMILY MEDICINE CLINIC | Facility: CLINIC | Age: 70
End: 2020-09-16
Payer: MEDICARE

## 2020-09-16 VITALS
SYSTOLIC BLOOD PRESSURE: 134 MMHG | WEIGHT: 186.6 LBS | OXYGEN SATURATION: 98 % | DIASTOLIC BLOOD PRESSURE: 78 MMHG | HEART RATE: 86 BPM | TEMPERATURE: 97.8 F | BODY MASS INDEX: 31.86 KG/M2 | HEIGHT: 64 IN

## 2020-09-16 DIAGNOSIS — B02.30 HERPES ZOSTER WITH OPHTHALMIC COMPLICATION, UNSPECIFIED HERPES ZOSTER EYE DISEASE: Primary | ICD-10-CM

## 2020-09-16 PROCEDURE — 99213 OFFICE O/P EST LOW 20 MIN: CPT | Performed by: NURSE PRACTITIONER

## 2020-09-16 RX ORDER — VALACYCLOVIR HYDROCHLORIDE 1 G/1
1000 TABLET, FILM COATED ORAL 2 TIMES DAILY
Qty: 14 TABLET | Refills: 0 | Status: SHIPPED | OUTPATIENT
Start: 2020-09-16 | End: 2020-09-23 | Stop reason: SDUPTHER

## 2020-09-16 NOTE — PROGRESS NOTES
Assessment/Plan:       Diagnoses and all orders for this visit:    Herpes zoster with ophthalmic complication, unspecified herpes zoster eye disease  -     valACYclovir (VALTREX) 1,000 mg tablet; Take 1 tablet (1,000 mg total) by mouth 2 (two) times a day for 7 days      Shingles of right forehead area/front scalp and one lesion on eye lid - will treat, to call if any worsening or changes in vision  Subjective:      Patient ID: Nimco Burton is a 78 y o  female  Here today for rash to forehead and eye area  Onset of pain to right eyelid starting yesterday with blinking  Rash noted today and right forehead/front scalp area  The following portions of the patient's history were reviewed and updated as appropriate: allergies, current medications, past family history, past medical history, past social history, past surgical history and problem list     Review of Systems   Eyes: Negative  Skin: Positive for rash  All other systems reviewed and are negative  Objective:      /78 (BP Location: Right arm, Patient Position: Sitting, Cuff Size: Standard)   Pulse 86   Temp 97 8 °F (36 6 °C)   Ht 5' 4" (1 626 m)   Wt 84 6 kg (186 lb 9 6 oz)   SpO2 98%   BMI 32 03 kg/m²          Physical Exam  Eyes:        Comments: No rash/redness noted below right eye       Skin:

## 2020-09-23 ENCOUNTER — TELEPHONE (OUTPATIENT)
Dept: FAMILY MEDICINE CLINIC | Facility: CLINIC | Age: 70
End: 2020-09-23

## 2020-09-23 ENCOUNTER — OFFICE VISIT (OUTPATIENT)
Dept: FAMILY MEDICINE CLINIC | Facility: CLINIC | Age: 70
End: 2020-09-23
Payer: MEDICARE

## 2020-09-23 VITALS
OXYGEN SATURATION: 97 % | WEIGHT: 184.4 LBS | BODY MASS INDEX: 31.48 KG/M2 | HEART RATE: 84 BPM | HEIGHT: 64 IN | SYSTOLIC BLOOD PRESSURE: 118 MMHG | DIASTOLIC BLOOD PRESSURE: 64 MMHG

## 2020-09-23 DIAGNOSIS — E11.65 TYPE 2 DIABETES MELLITUS WITH HYPERGLYCEMIA, WITHOUT LONG-TERM CURRENT USE OF INSULIN (HCC): Primary | ICD-10-CM

## 2020-09-23 DIAGNOSIS — Z12.12 SCREENING FOR COLORECTAL CANCER: ICD-10-CM

## 2020-09-23 DIAGNOSIS — B02.30 HERPES ZOSTER WITH OPHTHALMIC COMPLICATION, UNSPECIFIED HERPES ZOSTER EYE DISEASE: ICD-10-CM

## 2020-09-23 DIAGNOSIS — Z12.11 SCREENING FOR COLORECTAL CANCER: ICD-10-CM

## 2020-09-23 DIAGNOSIS — E78.49 OTHER HYPERLIPIDEMIA: ICD-10-CM

## 2020-09-23 DIAGNOSIS — M85.89 OSTEOPENIA OF MULTIPLE SITES: ICD-10-CM

## 2020-09-23 LAB — SL AMB POCT HEMOGLOBIN AIC: 6.7 (ref ?–6.5)

## 2020-09-23 PROCEDURE — 99214 OFFICE O/P EST MOD 30 MIN: CPT | Performed by: NURSE PRACTITIONER

## 2020-09-23 PROCEDURE — 83036 HEMOGLOBIN GLYCOSYLATED A1C: CPT | Performed by: NURSE PRACTITIONER

## 2020-09-23 RX ORDER — VALACYCLOVIR HYDROCHLORIDE 1 G/1
1000 TABLET, FILM COATED ORAL 2 TIMES DAILY
Qty: 6 TABLET | Refills: 0 | Status: SHIPPED | OUTPATIENT
Start: 2020-09-23 | End: 2021-05-03

## 2020-09-23 NOTE — PROGRESS NOTES
BMI Counseling: Body mass index is 31 65 kg/m²  The BMI is above normal  Nutrition recommendations include encouraging healthy choices of fruits and vegetables and moderation in carbohydrate intake  Exercise recommendations include moderate physical activity 150 minutes/week  Assessment/Plan:       Diagnoses and all orders for this visit:    Type 2 diabetes mellitus with hyperglycemia, without long-term current use of insulin (HCC)  -     POCT hemoglobin A1c    Screening for colorectal cancer  -     Cologuard; Future    Herpes zoster with ophthalmic complication, unspecified herpes zoster eye disease  -     valACYclovir (VALTREX) 1,000 mg tablet; Take 1 tablet (1,000 mg total) by mouth 2 (two) times a day for 3 days    Osteopenia of multiple sites  -     DXA bone density spine hip and pelvis; Future    Other hyperlipidemia      Shingles are healing nicely, additional 3 days of valtrex prescribed  DEXA scan ordered due to osteopenia diagnosis with 2018 DEXA scan  HA1C of 6 7 is acceptable - continue current regimen  Continue current activity level  Call if no improvement or worsening in rash  Subjective:      Patient ID: Genoveva Hernandez is a 78 y o  female  Here today for follow-up related to her T2DM and recent right periorbital shingles  Reports no pain with her shingles, the rash is healing well, vesicular lesions are drying  No infection noted  States of remaining active, eating healthy, not over-indulging in carbohydrates  She had her flu vaccine on 09/08/2020  Is due for her pneumococcal ppv23 vaccine - had prevnar in 2017  Last DEXA in 2018 with diagnosis of osteopenia in multiple sites  Denies any open areas on her feet or numbness/tingling with them          The following portions of the patient's history were reviewed and updated as appropriate: allergies, current medications, past family history, past medical history, past social history, past surgical history and problem list     Review of Systems   Constitutional: Negative  Respiratory: Negative  Cardiovascular: Negative  Skin: Positive for rash  Please see HPI  Allergic/Immunologic: Negative  Objective:      /64 (BP Location: Left arm, Patient Position: Sitting, Cuff Size: Large)   Pulse 84   Ht 5' 4" (1 626 m)   Wt 83 6 kg (184 lb 6 4 oz)   SpO2 97%   BMI 31 65 kg/m²          Physical Exam  Constitutional:       Appearance: Normal appearance  Cardiovascular:      Rate and Rhythm: Normal rate and regular rhythm  Heart sounds: No murmur  Pulmonary:      Effort: Pulmonary effort is normal  No respiratory distress  Breath sounds: Normal breath sounds  Skin:         Neurological:      General: No focal deficit present  Mental Status: She is alert and oriented to person, place, and time  Mental status is at baseline  Psychiatric:         Mood and Affect: Mood normal          Behavior: Behavior normal          Thought Content: Thought content normal          Judgment: Judgment normal            Patient's shoes and socks were not removed  Assign Risk Category:  No deformity present;  No loss of protective sensation;        Risk: 0

## 2020-09-29 ENCOUNTER — TELEPHONE (OUTPATIENT)
Dept: FAMILY MEDICINE CLINIC | Facility: CLINIC | Age: 70
End: 2020-09-29

## 2020-09-29 DIAGNOSIS — B02.9 HERPES ZOSTER WITHOUT COMPLICATION: Primary | ICD-10-CM

## 2020-09-29 RX ORDER — LIDOCAINE 50 MG/G
1 PATCH TOPICAL DAILY
Qty: 30 PATCH | Refills: 0 | Status: SHIPPED | OUTPATIENT
Start: 2020-09-29 | End: 2021-05-03

## 2020-09-29 NOTE — TELEPHONE ENCOUNTER
Just had shingles  She had follow up with Lia  She is still having pain and itching at the sites where the lesions were   Her  is asking what they can do to help give some relief

## 2020-09-29 NOTE — TELEPHONE ENCOUNTER
I would recommend that she use a Lidoderm patch applied to the area where she has pain and itching for 12 hours a day  I sent a prescription to the [facility] [address] [facility]

## 2020-09-30 ENCOUNTER — HOSPITAL ENCOUNTER (OUTPATIENT)
Dept: RADIOLOGY | Facility: CLINIC | Age: 70
Discharge: HOME | End: 2020-09-30
Payer: MEDICARE

## 2020-09-30 ENCOUNTER — TELEPHONE (OUTPATIENT)
Dept: FAMILY MEDICINE CLINIC | Facility: CLINIC | Age: 70
End: 2020-09-30

## 2020-09-30 DIAGNOSIS — M85.89 OSTEOPENIA OF MULTIPLE SITES: ICD-10-CM

## 2020-09-30 PROCEDURE — 77080 DXA BONE DENSITY AXIAL: CPT

## 2020-09-30 NOTE — TELEPHONE ENCOUNTER
[facility] sent a request for a prior auth on the Lidocaine 5% patches       Auth started with insurance awaiting approval/denial for medication

## 2020-10-08 ENCOUNTER — APPOINTMENT (EMERGENCY)
Dept: CT IMAGING | Facility: HOSPITAL | Age: 70
End: 2020-10-08
Payer: MEDICARE

## 2020-10-08 ENCOUNTER — HOSPITAL ENCOUNTER (EMERGENCY)
Facility: HOSPITAL | Age: 70
Discharge: HOME | End: 2020-10-08
Attending: EMERGENCY MEDICINE | Admitting: EMERGENCY MEDICINE
Payer: MEDICARE

## 2020-10-08 VITALS
HEART RATE: 76 BPM | WEIGHT: 187.17 LBS | TEMPERATURE: 97.1 F | RESPIRATION RATE: 18 BRPM | SYSTOLIC BLOOD PRESSURE: 138 MMHG | DIASTOLIC BLOOD PRESSURE: 63 MMHG | BODY MASS INDEX: 32.13 KG/M2 | OXYGEN SATURATION: 95 %

## 2020-10-08 DIAGNOSIS — B02.29 POST HERPETIC NEURALGIA: Primary | ICD-10-CM

## 2020-10-08 LAB
ANION GAP SERPL CALCULATED.3IONS-SCNC: 7 MMOL/L (ref 4–13)
BASOPHILS # BLD AUTO: 0.06 THOUSANDS/ΜL (ref 0–0.1)
BASOPHILS NFR BLD AUTO: 1 % (ref 0–1)
BUN SERPL-MCNC: 16 MG/DL (ref 5–25)
CALCIUM SERPL-MCNC: 9.2 MG/DL (ref 8.3–10.1)
CHLORIDE SERPL-SCNC: 104 MMOL/L (ref 100–108)
CO2 SERPL-SCNC: 23 MMOL/L (ref 21–32)
CREAT SERPL-MCNC: 1.06 MG/DL (ref 0.6–1.3)
CRP SERPL QL: <0.5 MG/L
EOSINOPHIL # BLD AUTO: 0.1 THOUSAND/ΜL (ref 0–0.61)
EOSINOPHIL NFR BLD AUTO: 1 % (ref 0–6)
ERYTHROCYTE [DISTWIDTH] IN BLOOD BY AUTOMATED COUNT: 13.2 % (ref 11.6–15.1)
ERYTHROCYTE [SEDIMENTATION RATE] IN BLOOD: 15 MM/HOUR (ref 0–29)
GFR SERPL CREATININE-BSD FRML MDRD: 54 ML/MIN/1.73SQ M
GLUCOSE SERPL-MCNC: 187 MG/DL (ref 65–140)
HCT VFR BLD AUTO: 43.1 % (ref 34.8–46.1)
HGB BLD-MCNC: 14 G/DL (ref 11.5–15.4)
IMM GRANULOCYTES # BLD AUTO: 0.02 THOUSAND/UL (ref 0–0.2)
IMM GRANULOCYTES NFR BLD AUTO: 0 % (ref 0–2)
LYMPHOCYTES # BLD AUTO: 1.83 THOUSANDS/ΜL (ref 0.6–4.47)
LYMPHOCYTES NFR BLD AUTO: 20 % (ref 14–44)
MCH RBC QN AUTO: 31.3 PG (ref 26.8–34.3)
MCHC RBC AUTO-ENTMCNC: 32.5 G/DL (ref 31.4–37.4)
MCV RBC AUTO: 96 FL (ref 82–98)
MONOCYTES # BLD AUTO: 0.59 THOUSAND/ΜL (ref 0.17–1.22)
MONOCYTES NFR BLD AUTO: 6 % (ref 4–12)
NEUTROPHILS # BLD AUTO: 6.66 THOUSANDS/ΜL (ref 1.85–7.62)
NEUTS SEG NFR BLD AUTO: 72 % (ref 43–75)
NRBC BLD AUTO-RTO: 0 /100 WBCS
PLATELET # BLD AUTO: 232 THOUSANDS/UL (ref 149–390)
PMV BLD AUTO: 9.9 FL (ref 8.9–12.7)
POTASSIUM SERPL-SCNC: 4.2 MMOL/L (ref 3.5–5.3)
RBC # BLD AUTO: 4.48 MILLION/UL (ref 3.81–5.12)
SODIUM SERPL-SCNC: 134 MMOL/L (ref 136–145)
WBC # BLD AUTO: 9.26 THOUSAND/UL (ref 4.31–10.16)

## 2020-10-08 PROCEDURE — 96374 THER/PROPH/DIAG INJ IV PUSH: CPT

## 2020-10-08 PROCEDURE — 99285 EMERGENCY DEPT VISIT HI MDM: CPT | Performed by: EMERGENCY MEDICINE

## 2020-10-08 PROCEDURE — 86140 C-REACTIVE PROTEIN: CPT | Performed by: PHYSICIAN ASSISTANT

## 2020-10-08 PROCEDURE — 85652 RBC SED RATE AUTOMATED: CPT | Performed by: PHYSICIAN ASSISTANT

## 2020-10-08 PROCEDURE — 99284 EMERGENCY DEPT VISIT MOD MDM: CPT

## 2020-10-08 PROCEDURE — 80048 BASIC METABOLIC PNL TOTAL CA: CPT | Performed by: PHYSICIAN ASSISTANT

## 2020-10-08 PROCEDURE — 70450 CT HEAD/BRAIN W/O DYE: CPT

## 2020-10-08 PROCEDURE — 85025 COMPLETE CBC W/AUTO DIFF WBC: CPT | Performed by: PHYSICIAN ASSISTANT

## 2020-10-08 PROCEDURE — 36415 COLL VENOUS BLD VENIPUNCTURE: CPT | Performed by: PHYSICIAN ASSISTANT

## 2020-10-08 PROCEDURE — G1004 CDSM NDSC: HCPCS

## 2020-10-08 RX ORDER — MORPHINE SULFATE 4 MG/ML
4 INJECTION, SOLUTION INTRAMUSCULAR; INTRAVENOUS ONCE
Status: COMPLETED | OUTPATIENT
Start: 2020-10-08 | End: 2020-10-08

## 2020-10-08 RX ORDER — GABAPENTIN 300 MG/1
300 CAPSULE ORAL 3 TIMES DAILY
Qty: 15 CAPSULE | Refills: 0 | Status: SHIPPED | OUTPATIENT
Start: 2020-10-08 | End: 2020-10-15

## 2020-10-08 RX ADMIN — MORPHINE SULFATE 4 MG: 4 INJECTION INTRAVENOUS at 14:36

## 2020-10-15 ENCOUNTER — OFFICE VISIT (OUTPATIENT)
Dept: FAMILY MEDICINE CLINIC | Facility: CLINIC | Age: 70
End: 2020-10-15
Payer: MEDICARE

## 2020-10-15 ENCOUNTER — LAB (OUTPATIENT)
Dept: LAB | Facility: CLINIC | Age: 70
End: 2020-10-15
Payer: MEDICARE

## 2020-10-15 ENCOUNTER — TRANSCRIBE ORDERS (OUTPATIENT)
Dept: ADMINISTRATIVE | Facility: HOSPITAL | Age: 70
End: 2020-10-15

## 2020-10-15 VITALS
HEIGHT: 64 IN | DIASTOLIC BLOOD PRESSURE: 84 MMHG | HEART RATE: 72 BPM | SYSTOLIC BLOOD PRESSURE: 126 MMHG | OXYGEN SATURATION: 98 % | WEIGHT: 181.6 LBS | BODY MASS INDEX: 31 KG/M2

## 2020-10-15 DIAGNOSIS — E11.65 TYPE 2 DIABETES MELLITUS WITH HYPERGLYCEMIA, WITHOUT LONG-TERM CURRENT USE OF INSULIN (HCC): ICD-10-CM

## 2020-10-15 DIAGNOSIS — R20.3 SENSITIVE SKIN: ICD-10-CM

## 2020-10-15 DIAGNOSIS — B02.29 POST HERPETIC NEURALGIA: ICD-10-CM

## 2020-10-15 DIAGNOSIS — E78.49 OTHER HYPERLIPIDEMIA: ICD-10-CM

## 2020-10-15 DIAGNOSIS — E11.65 TYPE 2 DIABETES MELLITUS WITH HYPERGLYCEMIA, WITHOUT LONG-TERM CURRENT USE OF INSULIN (HCC): Primary | ICD-10-CM

## 2020-10-15 LAB
ALBUMIN SERPL BCP-MCNC: 3.8 G/DL (ref 3.5–5)
ALP SERPL-CCNC: 71 U/L (ref 46–116)
ALT SERPL W P-5'-P-CCNC: 17 U/L (ref 12–78)
ANION GAP SERPL CALCULATED.3IONS-SCNC: 4 MMOL/L (ref 4–13)
AST SERPL W P-5'-P-CCNC: 9 U/L (ref 5–45)
BILIRUB SERPL-MCNC: 0.27 MG/DL (ref 0.2–1)
BUN SERPL-MCNC: 14 MG/DL (ref 5–25)
CALCIUM SERPL-MCNC: 9.2 MG/DL (ref 8.3–10.1)
CHLORIDE SERPL-SCNC: 108 MMOL/L (ref 100–108)
CHOLEST SERPL-MCNC: 149 MG/DL (ref 50–200)
CO2 SERPL-SCNC: 29 MMOL/L (ref 21–32)
CREAT SERPL-MCNC: 1.08 MG/DL (ref 0.6–1.3)
CREAT UR-MCNC: 145 MG/DL
GFR SERPL CREATININE-BSD FRML MDRD: 53 ML/MIN/1.73SQ M
GLUCOSE P FAST SERPL-MCNC: 138 MG/DL (ref 65–99)
HDLC SERPL-MCNC: 49 MG/DL
LDLC SERPL CALC-MCNC: 61 MG/DL (ref 0–100)
MICROALBUMIN UR-MCNC: 27.5 MG/L (ref 0–20)
MICROALBUMIN/CREAT 24H UR: 19 MG/G CREATININE (ref 0–30)
NONHDLC SERPL-MCNC: 100 MG/DL
POTASSIUM SERPL-SCNC: 4.3 MMOL/L (ref 3.5–5.3)
PROT SERPL-MCNC: 8 G/DL (ref 6.4–8.2)
SODIUM SERPL-SCNC: 141 MMOL/L (ref 136–145)
TRIGL SERPL-MCNC: 193 MG/DL

## 2020-10-15 PROCEDURE — 80061 LIPID PANEL: CPT

## 2020-10-15 PROCEDURE — 82570 ASSAY OF URINE CREATININE: CPT | Performed by: NURSE PRACTITIONER

## 2020-10-15 PROCEDURE — 82043 UR ALBUMIN QUANTITATIVE: CPT | Performed by: NURSE PRACTITIONER

## 2020-10-15 PROCEDURE — 36415 COLL VENOUS BLD VENIPUNCTURE: CPT

## 2020-10-15 PROCEDURE — 80053 COMPREHEN METABOLIC PANEL: CPT

## 2020-10-15 PROCEDURE — 99213 OFFICE O/P EST LOW 20 MIN: CPT | Performed by: NURSE PRACTITIONER

## 2020-10-15 RX ORDER — GABAPENTIN 100 MG/1
100 CAPSULE ORAL 3 TIMES DAILY
Qty: 60 CAPSULE | Refills: 0 | Status: SHIPPED | OUTPATIENT
Start: 2020-10-15 | End: 2021-03-08 | Stop reason: SDUPTHER

## 2020-10-23 ENCOUNTER — TELEPHONE (OUTPATIENT)
Dept: FAMILY MEDICINE CLINIC | Facility: CLINIC | Age: 70
End: 2020-10-23

## 2020-10-26 DIAGNOSIS — L29.9 PRURITIC CONDITION: Primary | ICD-10-CM

## 2020-10-26 RX ORDER — PREDNISONE 10 MG/1
10 TABLET ORAL 2 TIMES DAILY WITH MEALS
Qty: 8 TABLET | Refills: 0 | Status: SHIPPED | OUTPATIENT
Start: 2020-10-26 | End: 2020-10-30

## 2020-10-26 RX ORDER — HYDROCORTISONE 25 MG/ML
LOTION TOPICAL 2 TIMES DAILY
Qty: 59 ML | Refills: 0 | Status: SHIPPED | OUTPATIENT
Start: 2020-10-26 | End: 2021-05-03

## 2020-11-24 ENCOUNTER — APPOINTMENT (OUTPATIENT)
Dept: URGENT CARE | Facility: CLINIC | Age: 70
End: 2020-11-24
Payer: MEDICARE

## 2020-11-24 ENCOUNTER — TRANSCRIBE ORDERS (OUTPATIENT)
Dept: LAB | Facility: CLINIC | Age: 70
End: 2020-11-24

## 2020-11-24 ENCOUNTER — APPOINTMENT (OUTPATIENT)
Dept: LAB | Facility: CLINIC | Age: 70
End: 2020-11-24
Payer: MEDICARE

## 2020-11-24 DIAGNOSIS — G40.109 KOJEVNIKOV'S EPILEPSY (HCC): Primary | ICD-10-CM

## 2020-11-24 DIAGNOSIS — G40.109 KOJEVNIKOV'S EPILEPSY (HCC): ICD-10-CM

## 2020-11-24 LAB
ANION GAP SERPL CALCULATED.3IONS-SCNC: 5 MMOL/L (ref 4–13)
BUN SERPL-MCNC: 16 MG/DL (ref 5–25)
CALCIUM SERPL-MCNC: 9.6 MG/DL (ref 8.3–10.1)
CHLORIDE SERPL-SCNC: 110 MMOL/L (ref 100–108)
CO2 SERPL-SCNC: 27 MMOL/L (ref 21–32)
CREAT SERPL-MCNC: 1.11 MG/DL (ref 0.6–1.3)
GFR SERPL CREATININE-BSD FRML MDRD: 51 ML/MIN/1.73SQ M
GLUCOSE P FAST SERPL-MCNC: 117 MG/DL (ref 65–99)
POTASSIUM SERPL-SCNC: 4.1 MMOL/L (ref 3.5–5.3)
SODIUM SERPL-SCNC: 142 MMOL/L (ref 136–145)

## 2020-11-24 PROCEDURE — 36415 COLL VENOUS BLD VENIPUNCTURE: CPT

## 2020-11-24 PROCEDURE — 80201 ASSAY OF TOPIRAMATE: CPT

## 2020-11-24 PROCEDURE — 80177 DRUG SCRN QUAN LEVETIRACETAM: CPT

## 2020-11-24 PROCEDURE — 80048 BASIC METABOLIC PNL TOTAL CA: CPT

## 2020-11-25 DIAGNOSIS — E78.49 OTHER HYPERLIPIDEMIA: ICD-10-CM

## 2020-11-25 DIAGNOSIS — M79.622 PAIN IN LEFT UPPER ARM: ICD-10-CM

## 2020-11-25 DIAGNOSIS — R05.9 COUGH: ICD-10-CM

## 2020-11-25 DIAGNOSIS — I10 ESSENTIAL HYPERTENSION: ICD-10-CM

## 2020-11-25 DIAGNOSIS — E11.65 TYPE 2 DIABETES MELLITUS WITH HYPERGLYCEMIA, WITHOUT LONG-TERM CURRENT USE OF INSULIN (HCC): ICD-10-CM

## 2020-11-25 RX ORDER — AMLODIPINE BESYLATE 2.5 MG/1
TABLET ORAL
Qty: 180 TABLET | Refills: 1 | Status: SHIPPED | OUTPATIENT
Start: 2020-11-25 | End: 2021-05-03 | Stop reason: SDUPTHER

## 2020-11-25 RX ORDER — ATORVASTATIN CALCIUM 20 MG/1
TABLET, FILM COATED ORAL
Qty: 90 TABLET | Refills: 1 | Status: SHIPPED | OUTPATIENT
Start: 2020-11-25 | End: 2021-05-03 | Stop reason: SDUPTHER

## 2020-11-25 RX ORDER — METFORMIN HYDROCHLORIDE 500 MG/1
TABLET, EXTENDED RELEASE ORAL
Qty: 90 TABLET | Refills: 1 | Status: SHIPPED | OUTPATIENT
Start: 2020-11-25 | End: 2021-05-07

## 2020-11-27 LAB — TOPIRAMATE SERPL-MCNC: 18 UG/ML (ref 2–25)

## 2020-11-28 LAB — LEVETIRACETAM SERPL-MCNC: 16 UG/ML (ref 10–40)

## 2020-12-23 ENCOUNTER — OFFICE VISIT (OUTPATIENT)
Dept: FAMILY MEDICINE CLINIC | Facility: CLINIC | Age: 70
End: 2020-12-23
Payer: MEDICARE

## 2020-12-23 VITALS
SYSTOLIC BLOOD PRESSURE: 124 MMHG | TEMPERATURE: 97.1 F | BODY MASS INDEX: 30.49 KG/M2 | HEIGHT: 64 IN | HEART RATE: 71 BPM | WEIGHT: 178.6 LBS | OXYGEN SATURATION: 98 % | DIASTOLIC BLOOD PRESSURE: 74 MMHG

## 2020-12-23 DIAGNOSIS — B02.29 POST HERPETIC NEURALGIA: ICD-10-CM

## 2020-12-23 DIAGNOSIS — I10 ESSENTIAL HYPERTENSION: ICD-10-CM

## 2020-12-23 DIAGNOSIS — E11.65 TYPE 2 DIABETES MELLITUS WITH HYPERGLYCEMIA, WITHOUT LONG-TERM CURRENT USE OF INSULIN (HCC): Primary | ICD-10-CM

## 2020-12-23 DIAGNOSIS — F33.0 MILD EPISODE OF RECURRENT MAJOR DEPRESSIVE DISORDER (HCC): ICD-10-CM

## 2020-12-23 DIAGNOSIS — Z23 ENCOUNTER FOR IMMUNIZATION: ICD-10-CM

## 2020-12-23 LAB — SL AMB POCT HEMOGLOBIN AIC: 6.4 (ref ?–6.5)

## 2020-12-23 PROCEDURE — 90750 HZV VACC RECOMBINANT IM: CPT | Performed by: NURSE PRACTITIONER

## 2020-12-23 PROCEDURE — 83036 HEMOGLOBIN GLYCOSYLATED A1C: CPT | Performed by: NURSE PRACTITIONER

## 2020-12-23 PROCEDURE — 99214 OFFICE O/P EST MOD 30 MIN: CPT | Performed by: NURSE PRACTITIONER

## 2020-12-23 PROCEDURE — 90471 IMMUNIZATION ADMIN: CPT | Performed by: NURSE PRACTITIONER

## 2020-12-23 PROCEDURE — 90732 PPSV23 VACC 2 YRS+ SUBQ/IM: CPT | Performed by: NURSE PRACTITIONER

## 2020-12-23 PROCEDURE — G0009 ADMIN PNEUMOCOCCAL VACCINE: HCPCS | Performed by: NURSE PRACTITIONER

## 2021-03-08 ENCOUNTER — OFFICE VISIT (OUTPATIENT)
Dept: FAMILY MEDICINE CLINIC | Facility: CLINIC | Age: 71
End: 2021-03-08
Payer: MEDICARE

## 2021-03-08 VITALS
WEIGHT: 175 LBS | HEIGHT: 64 IN | TEMPERATURE: 97.8 F | HEART RATE: 69 BPM | SYSTOLIC BLOOD PRESSURE: 118 MMHG | OXYGEN SATURATION: 98 % | DIASTOLIC BLOOD PRESSURE: 66 MMHG | BODY MASS INDEX: 29.88 KG/M2

## 2021-03-08 DIAGNOSIS — B02.29 POST HERPETIC NEURALGIA: ICD-10-CM

## 2021-03-08 PROCEDURE — 99213 OFFICE O/P EST LOW 20 MIN: CPT | Performed by: NURSE PRACTITIONER

## 2021-03-08 RX ORDER — GABAPENTIN 300 MG/1
300 CAPSULE ORAL 3 TIMES DAILY
Qty: 90 CAPSULE | Refills: 0 | Status: SHIPPED | OUTPATIENT
Start: 2021-03-08 | End: 2021-04-07 | Stop reason: SDUPTHER

## 2021-03-08 NOTE — PATIENT INSTRUCTIONS
Try the Gabapentin 300 mg for two weeks then let us know if it is not helping  If it is not helping, we will try the Lyrica

## 2021-03-08 NOTE — PROGRESS NOTES
Assessment/Plan:       Diagnoses and all orders for this visit:    Post herpetic neuralgia  -     gabapentin (NEURONTIN) 300 mg capsule; Take 1 capsule (300 mg total) by mouth 3 (three) times a day      Will increase her Gabapentin to 300 mg and re-evaluate at her follow-up  If no improvement will try for Lyrica  Subjective:      Patient ID: Ander Loyola is a 78 y o  female  Here today with her  with complaint of ongoing nerve pain post shingles  Notes of pain/itching on her scalp and around her right eye where her shingles were several months ago  Was taking Gabapentin 100 mg TID with limited improvement  The following portions of the patient's history were reviewed and updated as appropriate: allergies, current medications, past family history, past medical history, past social history, past surgical history and problem list     Review of Systems   Constitutional: Negative  Respiratory: Negative  Cardiovascular: Negative  Neurological:        Please see HPI  Objective:      /66 (BP Location: Right arm, Patient Position: Sitting, Cuff Size: Large)   Pulse 69   Temp 97 8 °F (36 6 °C)   Ht 5' 4" (1 626 m)   Wt 79 4 kg (175 lb)   SpO2 98%   BMI 30 04 kg/m²          Physical Exam  Pulmonary:      Effort: Pulmonary effort is normal    Skin:         Neurological:      General: No focal deficit present  Mental Status: She is alert and oriented to person, place, and time  Mental status is at baseline

## 2021-04-07 DIAGNOSIS — B02.29 POST HERPETIC NEURALGIA: ICD-10-CM

## 2021-04-07 RX ORDER — GABAPENTIN 300 MG/1
300 CAPSULE ORAL 3 TIMES DAILY
Qty: 90 CAPSULE | Refills: 2 | Status: SHIPPED | OUTPATIENT
Start: 2021-04-07 | End: 2021-07-16 | Stop reason: SDUPTHER

## 2021-05-03 ENCOUNTER — OFFICE VISIT (OUTPATIENT)
Dept: FAMILY MEDICINE CLINIC | Facility: CLINIC | Age: 71
End: 2021-05-03
Payer: MEDICARE

## 2021-05-03 VITALS
WEIGHT: 181 LBS | HEART RATE: 70 BPM | SYSTOLIC BLOOD PRESSURE: 124 MMHG | BODY MASS INDEX: 30.9 KG/M2 | TEMPERATURE: 97.9 F | HEIGHT: 64 IN | DIASTOLIC BLOOD PRESSURE: 82 MMHG | OXYGEN SATURATION: 97 %

## 2021-05-03 DIAGNOSIS — I10 ESSENTIAL HYPERTENSION: ICD-10-CM

## 2021-05-03 DIAGNOSIS — E11.65 TYPE 2 DIABETES MELLITUS WITH HYPERGLYCEMIA, WITHOUT LONG-TERM CURRENT USE OF INSULIN (HCC): Primary | ICD-10-CM

## 2021-05-03 DIAGNOSIS — M35.00 SICCA SYNDROME (HCC): ICD-10-CM

## 2021-05-03 DIAGNOSIS — D43.2 GANGLIOGLIOMA OF BRAIN (HCC): ICD-10-CM

## 2021-05-03 DIAGNOSIS — Z23 ENCOUNTER FOR IMMUNIZATION: ICD-10-CM

## 2021-05-03 DIAGNOSIS — Z12.31 ENCOUNTER FOR SCREENING MAMMOGRAM FOR BREAST CANCER: ICD-10-CM

## 2021-05-03 DIAGNOSIS — E78.49 OTHER HYPERLIPIDEMIA: ICD-10-CM

## 2021-05-03 DIAGNOSIS — G40.909 SEIZURE DISORDER (HCC): ICD-10-CM

## 2021-05-03 DIAGNOSIS — F33.0 MILD EPISODE OF RECURRENT MAJOR DEPRESSIVE DISORDER (HCC): ICD-10-CM

## 2021-05-03 LAB — SL AMB POCT HEMOGLOBIN AIC: 6.4 (ref ?–6.5)

## 2021-05-03 PROCEDURE — 90471 IMMUNIZATION ADMIN: CPT

## 2021-05-03 PROCEDURE — 99214 OFFICE O/P EST MOD 30 MIN: CPT | Performed by: NURSE PRACTITIONER

## 2021-05-03 PROCEDURE — 83036 HEMOGLOBIN GLYCOSYLATED A1C: CPT | Performed by: NURSE PRACTITIONER

## 2021-05-03 PROCEDURE — 90750 HZV VACC RECOMBINANT IM: CPT

## 2021-05-03 RX ORDER — AMLODIPINE BESYLATE 2.5 MG/1
5 TABLET ORAL DAILY
Qty: 180 TABLET | Refills: 1 | Status: SHIPPED | OUTPATIENT
Start: 2021-05-03 | End: 2021-10-22 | Stop reason: SDUPTHER

## 2021-05-03 RX ORDER — LEVETIRACETAM 750 MG/1
750 TABLET ORAL 2 TIMES DAILY
COMMUNITY

## 2021-05-03 RX ORDER — ATORVASTATIN CALCIUM 20 MG/1
20 TABLET, FILM COATED ORAL
Qty: 90 TABLET | Refills: 1 | Status: SHIPPED | OUTPATIENT
Start: 2021-05-03 | End: 2021-10-22 | Stop reason: SDUPTHER

## 2021-05-03 NOTE — PROGRESS NOTES
Assessment/Plan:       Diagnoses and all orders for this visit:    Type 2 diabetes mellitus with hyperglycemia, without long-term current use of insulin (HCC)  -     POCT hemoglobin A1c    Essential hypertension  -     amLODIPine (NORVASC) 2 5 mg tablet; Take 2 tablets (5 mg total) by mouth daily    Mild episode of recurrent major depressive disorder (HCC)    Seizure disorder (HCC)    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HCC)    Other hyperlipidemia  -     atorvastatin (LIPITOR) 20 mg tablet; Take 1 tablet (20 mg total) by mouth daily at bedtime    Sicca syndrome ([facility] [address] )    Encounter for immunization  -     Zoster Vaccine Recombinant IM    BMI 31 0-31 9,adult    Encounter for screening mammogram for breast cancer  -     Mammo screening bilateral w 3d & cad; Future    Other orders  -     levETIRAcetam (KEPPRA) 750 mg tablet; Take 750 mg by mouth 2 (two) times a day 1 2 pill in am, 1 pill in pm     Continue with current medication regimen  I discussed her BP results today with her and her  - her diastolic is higher than pasts BP's but still within normal range  HA1C continues at 6 4% which is acceptable at this time  Continues with diet and exercise, no recent falls at this time  2nd shingles vaccine provided today  BMI Counseling: Body mass index is 31 07 kg/m²  The BMI is above normal  Nutrition recommendations include encouraging healthy choices of fruits and vegetables, moderation in carbohydrate intake and reducing intake of saturated and trans fat  Exercise recommendations include moderate physical activity 150 minutes/week  Subjective:      Patient ID: Claudetta Harry is a 78 y o  female  Here today accompanied by her   She is with a past hx of a ganglioma of the brain that is stable, past hx of seizure - stable on Keppra  She is also with a past hx of shingles, with accompanying post herpatic neuralgia with having pain and itching, now controlled with gabapentin    Her T2DM also currently controlled, as is her BP  Hx of sicca syndrome that is controlled  Hx of depression stable on venlafexine and escitalopram prescribed by a different physician  The following portions of the patient's history were reviewed and updated as appropriate: allergies, current medications, past family history, past medical history, past social history, past surgical history and problem list     Review of Systems   Constitutional: Negative  Respiratory: Negative  Cardiovascular: Negative  Gastrointestinal: Negative  GERD controlled   Neurological: Negative  All other systems reviewed and are negative  Objective:      /82 (BP Location: Left arm, Patient Position: Sitting, Cuff Size: Standard)   Pulse 70   Temp 97 9 °F (36 6 °C)   Ht 5' 4" (1 626 m)   Wt 82 1 kg (181 lb)   SpO2 97%   BMI 31 07 kg/m²          Physical Exam  Constitutional:       Appearance: Normal appearance  HENT:      Head: Normocephalic and atraumatic  Eyes:      Extraocular Movements: Extraocular movements intact  Conjunctiva/sclera: Conjunctivae normal    Cardiovascular:      Rate and Rhythm: Normal rate and regular rhythm  Heart sounds: No murmur  No gallop  Pulmonary:      Effort: Pulmonary effort is normal  No respiratory distress  Breath sounds: Normal breath sounds  No wheezing or rales  Neurological:      General: No focal deficit present  Mental Status: She is alert and oriented to person, place, and time  Mental status is at baseline  Psychiatric:         Mood and Affect: Mood normal          Behavior: Behavior normal          Thought Content:  Thought content normal          Judgment: Judgment normal

## 2021-05-03 NOTE — PATIENT INSTRUCTIONS
You may receive a survey in the mail, or by e-mail, please fill it out COMPLETELY, and let us know how we did! Please remember to sign up for your [employer] debby to check you lab results, send us messages, and schedule appointments  If you have questions about the [employer] option, please call us! Thank you again for choosing [facility]!

## 2021-05-06 DIAGNOSIS — E11.65 TYPE 2 DIABETES MELLITUS WITH HYPERGLYCEMIA, WITHOUT LONG-TERM CURRENT USE OF INSULIN (HCC): ICD-10-CM

## 2021-05-07 RX ORDER — METFORMIN HYDROCHLORIDE 500 MG/1
TABLET, EXTENDED RELEASE ORAL
Qty: 90 TABLET | Refills: 1 | Status: SHIPPED | OUTPATIENT
Start: 2021-05-07 | End: 2021-10-22 | Stop reason: SDUPTHER

## 2021-07-16 ENCOUNTER — OFFICE VISIT (OUTPATIENT)
Dept: FAMILY MEDICINE CLINIC | Facility: CLINIC | Age: 71
End: 2021-07-16
Payer: MEDICARE

## 2021-07-16 VITALS
OXYGEN SATURATION: 98 % | BODY MASS INDEX: 31.65 KG/M2 | SYSTOLIC BLOOD PRESSURE: 124 MMHG | TEMPERATURE: 97.3 F | HEIGHT: 64 IN | WEIGHT: 185.4 LBS | DIASTOLIC BLOOD PRESSURE: 68 MMHG | HEART RATE: 74 BPM

## 2021-07-16 DIAGNOSIS — S39.012A STRAIN OF LUMBAR REGION, INITIAL ENCOUNTER: ICD-10-CM

## 2021-07-16 DIAGNOSIS — E78.49 OTHER HYPERLIPIDEMIA: ICD-10-CM

## 2021-07-16 DIAGNOSIS — E11.9 ENCOUNTER FOR DIABETIC FOOT EXAM (HCC): ICD-10-CM

## 2021-07-16 DIAGNOSIS — Z00.00 MEDICARE ANNUAL WELLNESS VISIT, SUBSEQUENT: Primary | ICD-10-CM

## 2021-07-16 DIAGNOSIS — E11.65 TYPE 2 DIABETES MELLITUS WITH HYPERGLYCEMIA, WITHOUT LONG-TERM CURRENT USE OF INSULIN (HCC): ICD-10-CM

## 2021-07-16 DIAGNOSIS — I10 ESSENTIAL HYPERTENSION: ICD-10-CM

## 2021-07-16 DIAGNOSIS — B02.29 POST HERPETIC NEURALGIA: ICD-10-CM

## 2021-07-16 PROCEDURE — 99213 OFFICE O/P EST LOW 20 MIN: CPT | Performed by: NURSE PRACTITIONER

## 2021-07-16 PROCEDURE — 1123F ACP DISCUSS/DSCN MKR DOCD: CPT | Performed by: NURSE PRACTITIONER

## 2021-07-16 PROCEDURE — G0439 PPPS, SUBSEQ VISIT: HCPCS | Performed by: NURSE PRACTITIONER

## 2021-07-16 RX ORDER — GABAPENTIN 300 MG/1
300 CAPSULE ORAL 3 TIMES DAILY
Qty: 270 CAPSULE | Refills: 1 | Status: SHIPPED | OUTPATIENT
Start: 2021-07-16 | End: 2021-10-22 | Stop reason: SDUPTHER

## 2021-07-16 NOTE — PROGRESS NOTES
Assessment and Plan:     Problem List Items Addressed This Visit        Endocrine    Type 2 diabetes mellitus with hyperglycemia, without long-term current use of insulin ([address] )       Cardiovascular and Mediastinum    Hypertension       Other    Hyperlipidemia    Relevant Orders    Lipid panel      Other Visit Diagnoses     Medicare annual wellness visit, subsequent    -  Primary    Strain of lumbar region, initial encounter        Relevant Medications    Diclofenac Sodium (VOLTAREN) 1 %    Post herpetic neuralgia        Relevant Medications    gabapentin (NEURONTIN) 300 mg capsule    Encounter for diabetic foot exam [facility])               Preventive health issues were discussed with patient, and age appropriate screening tests were ordered as noted in patient's After Visit Summary  Personalized health advice and appropriate referrals for health education or preventive services given if needed, as noted in patient's After Visit Summary       History of Present Illness:     Patient presents for Medicare Annual Wellness visit    Patient Care Team:  Gaby Contreras as PCP - General (Family Medicine)     Problem List:     Patient Active Problem List   Diagnosis    Esophageal reflux    Type 2 diabetes mellitus with hyperglycemia, without long-term current use of insulin ([address] )    Migraine    Hypertension    Sciatica    Sjogren's syndrome ([address] )    Hyperlipidemia    Depression    Sicca syndrome ([address] )    Severe sepsis (HCC)    S/P craniotomy    Mild episode of recurrent major depressive disorder ([address] )    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[address] )      Past Medical and Surgical History:     Past Medical History:   Diagnosis Date    Cervicalgia     Depression     Diabetes mellitus ([address] )     Esophageal reflux     Hyperlipidemia     Hypertension     Essential    Migraine     Nonspecific abnormal electrocardiogram (ECG)     Sciatica     Seizure disorder ([address] )     Sjogren's syndrome ([address] )      Past Surgical History:   Procedure Laterality Date    CERVICAL POLYPECTOMY      CRANIOTOMY       Left frontal craniotomy for resection of tumor-  [facility]     TUBAL LIGATION        Family History:     Family History   Problem Relation Age of Onset    Heart disease Brother     Heart disease Father     Diabetes Maternal Aunt     Diabetes Maternal Grandmother     Aneurysm Mother     Lupus Sister     Thyroid cancer Sister         age unknown    No Known Problems Maternal Grandfather     No Known Problems Paternal Grandmother     No Known Problems Paternal Grandfather     No Known Problems Sister     No Known Problems Sister     No Known Problems Maternal Aunt     No Known Problems Maternal Aunt     No Known Problems Paternal Aunt       Social History:     Social History     Socioeconomic History    Marital status: /Civil Union     Spouse name: Not on file    Number of children: Not on file    Years of education: Not on file    Highest education level: Not on file   Occupational History    Not on file   Tobacco Use    Smoking status: Never Smoker    Smokeless tobacco: Never Used   Vaping Use    Vaping Use: Never used   Substance and Sexual Activity    Alcohol use: Never    Drug use: Never    Sexual activity: Not Currently   Other Topics Concern    Not on file   Social History Narrative    Not on file     Social Determinants of Health     Financial Resource Strain:     Difficulty of Paying Living Expenses:    Food Insecurity:     Worried About Running Out of Food in the Last Year:     [address] in the Last Year:    Transportation Needs:     Lack of Transportation (Medical):      Lack of Transportation (Non-Medical):    Physical Activity:     Days of Exercise per Week:     Minutes of Exercise per Session:    Stress:     Feeling of Stress :    Social Connections:     Frequency of Communication with Friends and Family:     Frequency of Social Gatherings with Friends and Family:     Attends [employer] Services:     Active Member of Clubs or Organizations:     Attends Club or Organization Meetings:     Marital Status:    Intimate Partner Violence:     Fear of Current or Ex-Partner:     Emotionally Abused:     Physically Abused:     Sexually Abused:       Medications and Allergies:     Current Outpatient Medications   Medication Sig Dispense Refill    amLODIPine (NORVASC) 2 5 mg tablet Take 2 tablets (5 mg total) by mouth daily 180 tablet 1    ASPIRIN 81 PO Take 81 mg by mouth daily      atorvastatin (LIPITOR) 20 mg tablet Take 1 tablet (20 mg total) by mouth daily at bedtime 90 tablet 1    escitalopram (LEXAPRO) 20 mg tablet Take 20 mg by mouth daily      estrogen, conjugated,-medroxyprogesterone (PREMPRO) 0 625-5 MG per tablet Take 1 tablet by mouth daily (Patient taking differently: Take 1 tablet by mouth daily Takes every 3 days) 90 tablet 1    gabapentin (NEURONTIN) 300 mg capsule Take 1 capsule (300 mg total) by mouth 3 (three) times a day 270 capsule 1    lansoprazole (PREVACID) 15 mg capsule Take 15 mg by mouth daily      levETIRAcetam (KEPPRA) 750 mg tablet Take 750 mg by mouth 2 (two) times a day 1 2 pill in am, 1 pill in pm      metFORMIN (GLUCOPHAGE-XR) 500 mg 24 hr tablet TAKE 1 TABLET DAILY WITH   BREAKFAST 90 tablet 1    Multiple Vitamins-Minerals (MULTIVITAMIN WITH MINERALS) tablet Take 1 tablet by mouth daily      topiramate (TOPAMAX) 200 MG tablet Take 200 mg by mouth 2 (two) times a day      traZODone (DESYREL) 100 mg tablet Take 100 mg by mouth daily at bedtime      venlafaxine (EFFEXOR-XR) 75 mg 24 hr capsule Take 75 mg by mouth daily      Diclofenac Sodium (VOLTAREN) 1 % Apply 2 g topically 4 (four) times a day 150 g 0     No current facility-administered medications for this visit       Allergies   Allergen Reactions    Lamotrigine      Other reaction(s): LAMOTRIGINE (LAMICTAL) rash      Immunizations:     Immunization History Administered Date(s) Administered    INFLUENZA 11/02/2005, 10/08/2008, 09/22/2009, 10/07/2010, 10/10/2012, 09/24/2013, 10/07/2016, 10/12/2016, 09/26/2017, 09/17/2018, 09/08/2020    Influenza Split High Dose Preservative Free IM 09/26/2017, 09/17/2018    Influenza, high dose seasonal 0 7 mL 10/04/2019    Influenza, seasonal, injectable, preservative free 10/04/2015    Pneumococcal Conjugate 13-Valent 09/26/2017    Pneumococcal Polysaccharide PPV23 12/23/2020    SARS-CoV-2 / COVID-19 mRNA IM (Moderna) 03/19/2021, 04/19/2021    Zoster Vaccine Recombinant 12/23/2020, 05/03/2021      Health Maintenance:         Topic Date Due    Cervical Cancer Screening  Never done    Breast Cancer Screening: Mammogram  07/22/2021    DXA SCAN  09/30/2022    Colorectal Cancer Screening  10/12/2023    Hepatitis C Screening  Completed     There are no preventive care reminders to display for this patient  Medicare Health Risk Assessment:     /68 (BP Location: Left arm, Patient Position: Sitting, Cuff Size: Large)   Pulse 74   Temp (!) 97 3 °F (36 3 °C)   Ht 5' 4" (1 626 m)   Wt 84 1 kg (185 lb 6 4 oz)   SpO2 98%   BMI 31 82 kg/m²          PREVENTIVE SCREENINGS      Cardiovascular Screening:    General: Screening Not Indicated and History Lipid Disorder      Diabetes Screening:     General: Screening Not Indicated and History Diabetes      Colorectal Cancer Screening:     General: Screening Current      Breast Cancer Screening:     General: Screening Current      Cervical Cancer Screening:    General: Screening Not Indicated      Osteoporosis Screening:    General: Screening Current      Abdominal Aortic Aneurysm (AAA) Screening:        General: Screening Not Indicated      Lung Cancer Screening:     General: Screening Not Indicated      Hepatitis C Screening:    General: Screening Current       Refills sent to the pharmacy  Will keep her on the gabapentin for now    See note for back pain and foot exam   Not due for HA1C at present           Irina Wiggins

## 2021-07-16 NOTE — PATIENT INSTRUCTIONS
Medicare Preventive Visit Patient Instructions  Thank you for completing your Welcome to Medicare Visit or Medicare Annual Wellness Visit today  Your next wellness visit will be due in one year (7/17/2022)  The screening/preventive services that you may require over the next 5-10 years are detailed below  Some tests may not apply to you based off risk factors and/or age  Screening tests ordered at today's visit but not completed yet may show as past due  Also, please note that scanned in results may not display below  Preventive Screenings:  Service Recommendations Previous Testing/Comments   Colorectal Cancer Screening  * Colonoscopy    * Fecal Occult Blood Test (FOBT)/Fecal Immunochemical Test (FIT)  * Fecal DNA/Cologuard Test  * Flexible Sigmoidoscopy Age: 54-65 years old   Colonoscopy: every 10 years (may be performed more frequently if at higher risk)  OR  FOBT/FIT: every 1 year  OR  Cologuard: every 3 years  OR  Sigmoidoscopy: every 5 years  Screening may be recommended earlier than age 48 if at higher risk for colorectal cancer  Also, an individualized decision between you and your healthcare provider will decide whether screening between the ages of 74-80 would be appropriate  Colonoscopy: 09/04/2009  FOBT/FIT: 10/08/2019  Cologuard: 10/12/2020  Sigmoidoscopy: Not on file    Screening Current     Breast Cancer Screening Age: 36 years old  Frequency: every 1-2 years  Not required if history of left and right mastectomy Mammogram: 07/22/2020    Screening Current   Cervical Cancer Screening Between the ages of 21-29, pap smear recommended once every 3 years  Between the ages of 33-67, can perform pap smear with HPV co-testing every 5 years     Recommendations may differ for women with a history of total hysterectomy, cervical cancer, or abnormal pap smears in past  Pap Smear: Not on file    Screening Not Indicated   Hepatitis C Screening Once for adults born between 1945 and 1965  More frequently in patients at high risk for Hepatitis C Hep C Antibody: 01/08/2020    Screening Current   Diabetes Screening 1-2 times per year if you're at risk for diabetes or have pre-diabetes Fasting glucose: 117 mg/dL   A1C: 6 4    Screening Not Indicated  History Diabetes   Cholesterol Screening Once every 5 years if you don't have a lipid disorder  May order more often based on risk factors  Lipid panel: 10/15/2020    Screening Not Indicated  History Lipid Disorder     Other Preventive Screenings Covered by Medicare:  1  Abdominal Aortic Aneurysm (AAA) Screening: covered once if your at risk  You're considered to be at risk if you have a family history of AAA  2  Lung Cancer Screening: covers low dose CT scan once per year if you meet all of the following conditions: (1) Age 50-69; (2) No signs or symptoms of lung cancer; (3) Current smoker or have quit smoking within the last 15 years; (4) You have a tobacco smoking history of at least 30 pack years (packs per day multiplied by number of years you smoked); (5) You get a written order from a healthcare provider  3  Glaucoma Screening: covered annually if you're considered high risk: (1) You have diabetes OR (2) Family history of glaucoma OR (3)  aged 48 and older OR (3)  American aged 72 and older  3  Osteoporosis Screening: covered every 2 years if you meet one of the following conditions: (1) You're estrogen deficient and at risk for osteoporosis based off medical history and other findings; (2) Have a vertebral abnormality; (3) On glucocorticoid therapy for more than 3 months; (4) Have primary hyperparathyroidism; (5) On osteoporosis medications and need to assess response to drug therapy  · Last bone density test (DXA Scan): 09/30/2020   5  HIV Screening: covered annually if you're between the age of 15-65  Also covered annually if you are younger than 13 and older than 72 with risk factors for HIV infection   For pregnant patients, it is covered up to 3 times per pregnancy  Immunizations:  Immunization Recommendations   Influenza Vaccine Annual influenza vaccination during flu season is recommended for all persons aged >= 6 months who do not have contraindications   Pneumococcal Vaccine (Prevnar and Pneumovax)  * Prevnar = PCV13  * Pneumovax = PPSV23   Adults 25-60 years old: 1-3 doses may be recommended based on certain risk factors  Adults 72 years old: Prevnar (PCV13) vaccine recommended followed by Pneumovax (PPSV23) vaccine  If already received PPSV23 since turning 65, then PCV13 recommended at least one year after PPSV23 dose  Hepatitis B Vaccine 3 dose series if at intermediate or high risk (ex: diabetes, end stage renal disease, liver disease)   Tetanus (Td) Vaccine - COST NOT COVERED BY MEDICARE PART B Following completion of primary series, a booster dose should be given every 10 years to maintain immunity against tetanus  Td may also be given as tetanus wound prophylaxis  Tdap Vaccine - COST NOT COVERED BY MEDICARE PART B Recommended at least once for all adults  For pregnant patients, recommended with each pregnancy  Shingles Vaccine (Shingrix) - COST NOT COVERED BY MEDICARE PART B  2 shot series recommended in those aged 48 and above     Health Maintenance Due:      Topic Date Due    Cervical Cancer Screening  Never done    Breast Cancer Screening: Mammogram  07/22/2021    DXA SCAN  09/30/2022    Colorectal Cancer Screening  10/12/2023    Hepatitis C Screening  Completed     Immunizations Due:  There are no preventive care reminders to display for this patient  Advance Directives   What are advance directives? Advance directives are legal documents that state your wishes and plans for medical care  These plans are made ahead of time in case you lose your ability to make decisions for yourself  Advance directives can apply to any medical decision, such as the treatments you want, and if you want to donate organs     What are the types of advance directives? There are many types of advance directives, and each state has rules about how to use them  You may choose a combination of any of the following:  · Living will: This is a written record of the treatment you want  You can also choose which treatments you do not want, which to limit, and which to stop at a certain time  This includes surgery, medicine, IV fluid, and tube feedings  · Durable power of  for healthcare [facility]): This is a written record that states who you want to make healthcare choices for you when you are unable to make them for yourself  This person, called a proxy, is usually a family member or a friend  You may choose more than 1 proxy  · Do not resuscitate (DNR) order:  A DNR order is used in case your heart stops beating or you stop breathing  It is a request not to have certain forms of treatment, such as CPR  A DNR order may be included in other types of advance directives  · Medical directive: This covers the care that you want if you are in a coma, near death, or unable to make decisions for yourself  You can list the treatments you want for each condition  Treatment may include pain medicine, surgery, blood transfusions, dialysis, IV or tube feedings, and a ventilator (breathing machine)  · Values history: This document has questions about your views, beliefs, and how you feel and think about life  This information can help others choose the care that you would choose  Why are advance directives important? An advance directive helps you control your care  Although spoken wishes may be used, it is better to have your wishes written down  Spoken wishes can be misunderstood, or not followed  Treatments may be given even if you do not want them  An advance directive may make it easier for your family to make difficult choices about your care     Weight Management   Why it is important to manage your weight:  Being overweight increases your risk of health conditions such as heart disease, high blood pressure, type 2 diabetes, and certain types of cancer  It can also increase your risk for osteoarthritis, sleep apnea, and other respiratory problems  Aim for a slow, steady weight loss  Even a small amount of weight loss can lower your risk of health problems  How to lose weight safely:  A safe and healthy way to lose weight is to eat fewer calories and get regular exercise  You can lose up about 1 pound a week by decreasing the number of calories you eat by 500 calories each day  Healthy meal plan for weight management:  A healthy meal plan includes a variety of foods, contains fewer calories, and helps you stay healthy  A healthy meal plan includes the following:  · Eat whole-grain foods more often  A healthy meal plan should contain fiber  Fiber is the part of grains, fruits, and vegetables that is not broken down by your body  Whole-grain foods are healthy and provide extra fiber in your diet  Some examples of whole-grain foods are whole-wheat breads and pastas, oatmeal, brown rice, and bulgur  · Eat a variety of vegetables every day  Include dark, leafy greens such as spinach, kale, chong greens, and mustard greens  Eat yellow and orange vegetables such as carrots, sweet potatoes, and winter squash  · Eat a variety of fruits every day  Choose fresh or canned fruit (canned in its own juice or light syrup) instead of juice  Fruit juice has very little or no fiber  · Eat low-fat dairy foods  Drink fat-free (skim) milk or 1% milk  Eat fat-free yogurt and low-fat cottage cheese  Try low-fat cheeses such as mozzarella and other reduced-fat cheeses  · Choose meat and other protein foods that are low in fat  Choose beans or other legumes such as split peas or lentils  Choose fish, skinless poultry (chicken or turkey), or lean cuts of red meat (beef or pork)  Before you cook meat or poultry, cut off any visible fat  · Use less fat and oil    Try baking foods instead of frying them  Add less fat, such as margarine, sour cream, regular salad dressing and mayonnaise to foods  Eat fewer high-fat foods  Some examples of high-fat foods include french fries, doughnuts, ice cream, and cakes  · Eat fewer sweets  Limit foods and drinks that are high in sugar  This includes candy, cookies, regular soda, and sweetened drinks  Exercise:  Exercise at least 30 minutes per day on most days of the week  Some examples of exercise include walking, biking, dancing, and swimming  You can also fit in more physical activity by taking the stairs instead of the elevator or parking farther away from stores  Ask your healthcare provider about the best exercise plan for you  © Copyright [employer] 2018 Information is for End User's use only and may not be sold, redistributed or otherwise used for commercial purposes   All illustrations and images included in CareNotes® are the copyrighted property of [employer]  or [address] [place] [address]

## 2021-07-16 NOTE — PROGRESS NOTES
Naomi Sandoval is here for her Subsequent Wellness visit  Last Medicare Wellness visit information reviewed, patient interviewed and updates made to the record today  Health Risk Assessment:   Patient rates overall health as good  Patient feels that their physical health rating is same  Patient is very satisfied with their life  Eyesight was rated as same  Hearing was rated as same  Patient feels that their emotional and mental health rating is same  Patients states they are never, rarely angry  Patient states they are sometimes unusually tired/fatigued  Pain experienced in the last 7 days has been a lot  Patient's pain rating has been 4/10  Patient states that she has experienced no weight loss or gain in last 6 months  Fall Risk Screening: In the past year, patient has experienced: no history of falling in past year      Urinary Incontinence Screening:   Patient has not leaked urine accidently in the last six months  Home Safety:  Patient does not have trouble with stairs inside or outside of their home  Patient has working smoke alarms and has working carbon monoxide detector  Home safety hazards include: none  Nutrition:   Current diet is Limited junk food  Medications:   Patient is currently taking over-the-counter supplements  OTC medications include: see medication list  Patient is able to manage medications  Activities of Daily Living (ADLs)/Instrumental Activities of Daily Living (IADLs):   Walk and transfer into and out of bed and chair?: Yes  Dress and groom yourself?: Yes    Bathe or shower yourself?: Yes    Feed yourself? Yes  Do your laundry/housekeeping?: Yes  Manage your money, pay your bills and track your expenses?: Yes  Make your own meals?: Yes    Do your own shopping?: Yes    Previous Hospitalizations:   Any hospitalizations or ED visits within the last 12 months?: No      Advance Care Planning:   Living will: No    Durable POA for healthcare: Yes    Five wishes given:  Yes Cognitive Screening:   Provider or family/friend/caregiver concerned regarding cognition?: No    PREVENTIVE SCREENINGS      Cardiovascular Screening:    General: Screening Not Indicated and History Lipid Disorder      Diabetes Screening:     General: Screening Not Indicated and History Diabetes      Colorectal Cancer Screening:     General: Screening Current      Breast Cancer Screening:     General: Screening Current      Cervical Cancer Screening:    General: Screening Not Indicated      Osteoporosis Screening:    General: Screening Current      Lung Cancer Screening:     General: Screening Not Indicated      Hepatitis C Screening:    General: Screening Current    Screening, Brief Intervention, and Referral to Treatment (SBIRT)    Screening  Typical number of drinks in a day: 0  Typical number of drinks in a week: 0  Interpretation: Low risk drinking behavior      Single Item Drug Screening:  How often have you used an illegal drug (including marijuana) or a prescription medication for non-medical reasons in the past year? never    Single Item Drug Screen Score: 0  Interpretation: Negative screen for possible drug use disorder

## 2021-07-16 NOTE — PROGRESS NOTES
Assessment/Plan:       Diagnoses and all orders for this visit:    Medicare annual wellness visit, subsequent    Strain of lumbar region, initial encounter  -     Discontinue: Diclofenac Sodium (VOLTAREN) 1 %; Apply 2 g topically 4 (four) times a day  -     Diclofenac Sodium (VOLTAREN) 1 %; Apply 2 g topically 4 (four) times a day    Other hyperlipidemia  -     Lipid panel; Future    Essential hypertension    Type 2 diabetes mellitus with hyperglycemia, without long-term current use of insulin ([facility])    Post herpetic neuralgia  -     gabapentin (NEURONTIN) 300 mg capsule; Take 1 capsule (300 mg total) by mouth 3 (three) times a day    Encounter for diabetic foot exam ([facility] [address] )     Suspect Lumbar strain - will have her use voltaren gel QID for 3-4 days and will have her rest over the weekend  Subjective:      Patient ID: Bernadette Solomon is a 73 y o  female  Here today for her AWV, also reports of lower left sided back pain  Onset 4 days ago  Notes she has been working in the yard moving sticks, branches etc recently  The following portions of the patient's history were reviewed and updated as appropriate: allergies, current medications, past family history, past medical history, past social history, past surgical history and problem list     Review of Systems   Musculoskeletal: Positive for back pain  All other systems reviewed and are negative  Objective:      /68 (BP Location: Left arm, Patient Position: Sitting, Cuff Size: Large)   Pulse 74   Temp (!) 97 3 °F (36 3 °C)   Ht 5' 4" (1 626 m)   Wt 84 1 kg (185 lb 6 4 oz)   SpO2 98%   BMI 31 82 kg/m²          Physical Exam  Cardiovascular:      Pulses: no weak pulses          Dorsalis pedis pulses are 2+ on the right side and 2+ on the left side  Musculoskeletal:      Lumbar back: Spasms present  No swelling, deformity, signs of trauma or tenderness  Normal range of motion          Back:    Feet:      Right foot:      Skin integrity: No ulcer, skin breakdown, erythema, warmth, callus or dry skin  Left foot:      Skin integrity: No ulcer, skin breakdown, erythema, warmth, callus or dry skin  Diabetic Foot Exam    Patient's shoes and socks removed  Right Foot/Ankle   Right Foot Inspection  Skin Exam: skin normal and skin intact no dry skin, no warmth, no callus, no erythema, no maceration, no abnormal color, no pre-ulcer, no ulcer and no callus                            Sensory       Monofilament testing: intact  Vascular    The right DP pulse is 2+  Left Foot/Ankle  Left Foot Inspection  Skin Exam: skin normal and skin intactno dry skin, no warmth, no erythema, no maceration, normal color, no pre-ulcer, no ulcer and no callus                                         Sensory       Monofilament: intact  Vascular    The left DP pulse is 2+  Assign Risk Category:  No deformity present; No loss of protective sensation;  No weak pulses       Risk: 0

## 2021-07-23 ENCOUNTER — HOSPITAL ENCOUNTER (OUTPATIENT)
Dept: RADIOLOGY | Facility: CLINIC | Age: 71
Discharge: HOME | End: 2021-07-23
Payer: MEDICARE

## 2021-07-23 VITALS — BODY MASS INDEX: 31.58 KG/M2 | WEIGHT: 185 LBS | HEIGHT: 64 IN

## 2021-07-23 DIAGNOSIS — Z12.31 ENCOUNTER FOR SCREENING MAMMOGRAM FOR BREAST CANCER: ICD-10-CM

## 2021-07-23 PROCEDURE — 77063 BREAST TOMOSYNTHESIS BI: CPT

## 2021-07-23 PROCEDURE — 77067 SCR MAMMO BI INCL CAD: CPT

## 2021-07-27 ENCOUNTER — APPOINTMENT (OUTPATIENT)
Dept: LAB | Facility: CLINIC | Age: 71
End: 2021-07-27
Payer: MEDICARE

## 2021-07-27 DIAGNOSIS — E78.49 OTHER HYPERLIPIDEMIA: ICD-10-CM

## 2021-07-27 LAB
CHOLEST SERPL-MCNC: 164 MG/DL (ref 50–200)
HDLC SERPL-MCNC: 57 MG/DL
LDLC SERPL CALC-MCNC: 80 MG/DL (ref 0–100)
NONHDLC SERPL-MCNC: 107 MG/DL
TRIGL SERPL-MCNC: 136 MG/DL

## 2021-07-27 PROCEDURE — 80061 LIPID PANEL: CPT

## 2021-07-27 PROCEDURE — 36415 COLL VENOUS BLD VENIPUNCTURE: CPT

## 2021-09-07 ENCOUNTER — TELEPHONE (OUTPATIENT)
Dept: FAMILY MEDICINE CLINIC | Facility: CLINIC | Age: 71
End: 2021-09-07

## 2021-09-07 NOTE — TELEPHONE ENCOUNTER
Patient was exposed to her grandson who is covid positive, she was exposed on 9/2  Patient is asymptomatic but was supposed to have an MRI done in [place] today  She was advised by that office that they will not do the MRI until she is tested  Do you want to see patient for a virtual visit or would you like to place an order for patient to get done?

## 2021-09-07 NOTE — TELEPHONE ENCOUNTER
I can see her tomorrow morning at 10 am - Silvia will overbook her  Have them come to the office at 10 if able, thanks

## 2021-09-14 ENCOUNTER — OFFICE VISIT (OUTPATIENT)
Dept: FAMILY MEDICINE CLINIC | Facility: CLINIC | Age: 71
End: 2021-09-14
Payer: MEDICARE

## 2021-09-14 VITALS
BODY MASS INDEX: 32.33 KG/M2 | SYSTOLIC BLOOD PRESSURE: 128 MMHG | OXYGEN SATURATION: 97 % | HEIGHT: 64 IN | WEIGHT: 189.4 LBS | DIASTOLIC BLOOD PRESSURE: 64 MMHG | HEART RATE: 76 BPM

## 2021-09-14 DIAGNOSIS — M54.50 CHRONIC RIGHT-SIDED LOW BACK PAIN WITHOUT SCIATICA: Primary | ICD-10-CM

## 2021-09-14 DIAGNOSIS — S39.012A STRAIN OF LUMBAR REGION, INITIAL ENCOUNTER: ICD-10-CM

## 2021-09-14 DIAGNOSIS — G89.29 CHRONIC RIGHT-SIDED LOW BACK PAIN WITHOUT SCIATICA: Primary | ICD-10-CM

## 2021-09-14 PROCEDURE — 99213 OFFICE O/P EST LOW 20 MIN: CPT | Performed by: NURSE PRACTITIONER

## 2021-09-14 NOTE — PROGRESS NOTES
Pt  Seen initially by JENI student  Assessed and reviewed this patient with the JENI student thereafter, see plan  Assessment/Plan:         Diagnoses and all orders for this visit:    Chronic right-sided low back pain without sciatica  -     Ambulatory referral to Physical Therapy; Future  -     Diclofenac Sodium (VOLTAREN) 1 %; Apply 2 g topically 4 (four) times a day    Strain of lumbar region, initial encounter  -     Ambulatory referral to Physical Therapy; Future  -     Diclofenac Sodium (VOLTAREN) 1 %; Apply 2 g topically 4 (four) times a day      She is agreeable to physical therapy, Voltaren gel and rest  Follow up in four weeks  If no improvement with PT will have an MRI completed to assess for further disc/nerve impingement issues  Stressed the importance of rest today to further strain her lumbar area  Subjective:      Patient ID: Zuleima Adamson is a 73 y o  female  Patient presents with worsening right, lower back pain  She was seen in July for a similar back pain which resolved with rest while utilizing Voltaren for pain relief  She reports no injuring event  She states pain increases with walking down stairs  She has no alarm symptoms, no numbness or tingling of lower extremities, no changes in bowel or bladder  Back Pain  This is a recurrent problem  The current episode started more than 1 month ago  The problem occurs intermittently  The quality of the pain is described as aching  The pain does not radiate  Pertinent negatives include no bladder incontinence, bowel incontinence or dysuria  She has tried NSAIDs for the symptoms  The following portions of the patient's history were reviewed and updated as appropriate: allergies, current medications, past family history, past medical history, past social history, past surgical history and problem list     Review of Systems   Constitutional: Negative  HENT: Negative  Eyes: Negative  Respiratory: Negative  Cardiovascular: Negative  Gastrointestinal: Negative  Negative for bowel incontinence  Endocrine: Negative  Genitourinary: Negative  Negative for bladder incontinence and dysuria  Musculoskeletal: Positive for back pain  Allergic/Immunologic: Negative  Neurological: Negative  Hematological: Negative  Psychiatric/Behavioral: Negative  Objective:      /64 (BP Location: Left arm, Patient Position: Sitting, Cuff Size: Large)   Pulse 76   Ht 5' 4" (1 626 m)   Wt 85 9 kg (189 lb 6 4 oz)   SpO2 97%   BMI 32 51 kg/m²          Physical Exam  Constitutional:       Appearance: Normal appearance  HENT:      Head: Normocephalic  Nose: Nose normal    Cardiovascular:      Rate and Rhythm: Normal rate  Musculoskeletal:         General: Normal range of motion  Cervical back: Normal range of motion  Skin:     Comments: Back pain affecting gait, patient slow to stand   Neurological:      Mental Status: She is alert

## 2021-09-16 ENCOUNTER — EVALUATION (OUTPATIENT)
Dept: PHYSICAL THERAPY | Facility: CLINIC | Age: 71
End: 2021-09-16
Payer: MEDICARE

## 2021-09-16 DIAGNOSIS — G89.29 CHRONIC RIGHT-SIDED LOW BACK PAIN WITHOUT SCIATICA: Primary | ICD-10-CM

## 2021-09-16 DIAGNOSIS — M54.50 CHRONIC RIGHT-SIDED LOW BACK PAIN WITHOUT SCIATICA: Primary | ICD-10-CM

## 2021-09-16 DIAGNOSIS — S39.012A STRAIN OF LUMBAR REGION, INITIAL ENCOUNTER: ICD-10-CM

## 2021-09-16 PROCEDURE — 97162 PT EVAL MOD COMPLEX 30 MIN: CPT | Performed by: PHYSICAL THERAPIST

## 2021-09-16 PROCEDURE — 97110 THERAPEUTIC EXERCISES: CPT | Performed by: PHYSICAL THERAPIST

## 2021-09-16 NOTE — PROGRESS NOTES
PT Evaluation      Today's date: 2021  Patient name: Bean Esquivel  : 1950  MRN: [id number]  Referring provider: Luis Kumar, *  Dx:   Encounter Diagnosis     ICD-10-CM    1  Chronic right-sided low back pain without sciatica  M54 5 Ambulatory referral to Physical Therapy    G89 29    2  Strain of lumbar region, initial encounter  S39 012A Ambulatory referral to Physical Therapy          Assessment  Assessment details:Kimberly Kowalski is a pleasant 73 y o  female presenting to PT with cc of acute low back pain  Pt  States pain began approx 4 weeks ago insidiously, but feels it is due to falling a few months ago which caused similar pain  Upon examination, patient was found to have objective deficits as listed below and is displaying ss consistent with quadratus strain and SIJ sprain  Pt  Is experiencing subsequent functional deficits including pain and difficulty walking, standing, and navigating stairs  Pt  Was educated on role of PT to address issues and given initial treatment with HEP  Pt  Would benefit from skilled physical therapy to promote improved function and maximize activity tolerance  Symptom irritability: highUnderstanding of Dx/Px/POC: good  Goals  ST weeks  -Pt  Will demonstrate L/S flexion AROM improvement of 25%  -Pt  Will demonstrate improved core stabilizer contraction, promoting improved muscle balance  LT weeks  -Pt  Will demonstrate ability to walk 1 mile without pain, demonstrating improved functional mobility   -Pt  Will demonstrate L/S AROM WNL  -Pt  Will demonstrate I with HEP upon DC  -Pt  Will demonstrate ability to effectively contract core mm with appropriate strength to perform lifting task     Plan  Patient would benefit from: skilled physical therapy  Planned modality interventions: cryotherapy, high voltage pulsed current: spasm management, high voltage pulsed current: pain management, thermotherapy: hydrocollator packs and unattended electrical stimulation  Planned therapy interventions: abdominal trunk stabilization, cognitive skills, functional ROM exercises, home exercise program, therapeutic training, therapeutic exercise, therapeutic activities, stretching, strengthening, postural training, neuromuscular re-education, motor coordination training, manual therapy, joint mobilization and massage  Frequency: 2x week  Duration in weeks: 6  Plan of Care beginning date:21   Plan of Care expiration date: 2021  Treatment plan discussed with: patient         Subjective Evaluation     History of Present Illness  Mechanism of injury: Poli Crenshaw presents to PT with cc of worsening R sided low back pain for past 4 weeks  She notes pain began insidiously and is primarily located on the Right  She notices it the most when navigating stairs and lifting  She hasnt been doing any treatment other than voltaren gel which she feels helps somewhat  She hopes to improve pain levels and increase functional activity  Pain  Current pain ratin  At best pain ratin  At worst pain ratin  Location:  paraspinals, right quadratus  Quality: tight, sharp, pressure   Relieving factors: rest, ice, heat and medications  Aggravating factors: walking, standing, sitting, stair climbing, lifting and running  Progression: worsening     Social Support  Steps to enter house: yes  Stairs in house: yes      Employment status: retired  Treatments  Previous Treatment: none  Current Treatment: medication         Objective      Concurrent Complaints  Negative for night pain, disturbed sleep, bladder dysfunction, bowel dysfunction and saddle (S4) numbness      Postural Observations  Seated posture: fair  Standing posture: fair           Palpation   Left   Tenderness of the erector spinae  Right   Muscle spasm in the quadratus lumborum  Tenderness of the erector spinae and quadratus lumborum  Tenderness      Lumbar Spine  Tenderness in the spinous process        Left Hip   Tenderness in the PSIS        Neurological Testing      Sensation      Lumbar   Left   Intact: light touch     Right   Intact: light touch     Reflexes   Left   Patellar (L4): brisk (2+)     Right   Patellar (L4): brisk (2+)     Active Range of Motion      Lumbar   Flexion:  with pain Restriction level: moderate  Extension:  with pain restriction level moderate  Left lateral flexion:  with pain Restriction level: moderate  Right lateral flexion:  with pain Restriction level: minimal  Left rotation:  WFL  Right rotation:  [facility]     Strength/Myotome Testing      Lumbar   Left   Normal strength     Right   Normal strength     Tests      Lumbar      Left   Negative crossed SLR  Positive Passive SLR     Right   Negative crossed SLR   Positive Passive SLR    Repeated Motions:  No centralization or peripheralization     General Comments:     Lower quarter screen   Hips: unremarkable  Knees: unremarkable  Foot/ankle: unremarkable    Precautions: None    Daily Treatment Diary     Manual  9/16            Pelvic Rocking -            Sacral Rocking -            L/S STM 20'            Piriformis TpR -                             Exercise Diary  9/16            Warm up  -            LTR -            SKTC -            TrA with gluteal iso -            PPT -            Marches with TrA -            Clamshells -            Ball squeeze -            90/90 stretch 10x5"            Piriformis Stretch 4x30"            Squats with TrA -            Marches with TrA -            PB marches -            Bridges -            Cat/Cow -            Osmin Pose -            Seated flexion stretch 4x30"                                                       Modalities  9/16            IFC with P -

## 2021-09-21 ENCOUNTER — OFFICE VISIT (OUTPATIENT)
Dept: PHYSICAL THERAPY | Facility: CLINIC | Age: 71
End: 2021-09-21
Payer: MEDICARE

## 2021-09-21 DIAGNOSIS — M54.50 CHRONIC RIGHT-SIDED LOW BACK PAIN WITHOUT SCIATICA: Primary | ICD-10-CM

## 2021-09-21 DIAGNOSIS — S39.012A STRAIN OF LUMBAR REGION, INITIAL ENCOUNTER: ICD-10-CM

## 2021-09-21 DIAGNOSIS — G89.29 CHRONIC RIGHT-SIDED LOW BACK PAIN WITHOUT SCIATICA: Primary | ICD-10-CM

## 2021-09-21 PROCEDURE — 97112 NEUROMUSCULAR REEDUCATION: CPT | Performed by: PHYSICAL THERAPIST

## 2021-09-21 PROCEDURE — 97110 THERAPEUTIC EXERCISES: CPT | Performed by: PHYSICAL THERAPIST

## 2021-09-21 PROCEDURE — 97140 MANUAL THERAPY 1/> REGIONS: CPT | Performed by: PHYSICAL THERAPIST

## 2021-09-21 NOTE — PROGRESS NOTES
Daily Note     Today's date: 2021  Patient name: Aracely Cai  : 1950  MRN: [id number]  Referring provider: Shola Birch, *  Dx:   Encounter Diagnosis     ICD-10-CM    1  Chronic right-sided low back pain without sciatica  M54 5     G89 29    2  Strain of lumbar region, initial encounter  S39 012A                   Subjective: Eather Wade notes low back is a bit better but continues to spasm every once in a while which causes intense pain  Objective: See treatment diary below      Assessment: Tolerated treatment well  Patient demonstrated fatigue post treatment, exhibited good technique with therapeutic exercises and would benefit from continued PT      Plan: Continue per plan of care  Progress treatment as tolerated         Precautions: None    Daily Treatment Diary     Manual             Pelvic Rocking - 5'           Sacral Rocking - -           L/S STM 20' 5'           Piriformis TpR - 5'                            Exercise Diary             Warm up  - [facility] L1 10'           LTR - 10x10"           SKTC - 10x10"           TrA with gluteal iso - 10x5" mod TC           PPT - -           Marches with TrA - -           Clamshells - -           Ball squeeze - -           90/90 stretch 10x5" 10x10"           Piriformis Stretch 4x30" 4x30"           Squats with TrA - -           Marches with TrA - -           PB marches - -           Bridges - -           Cat/Cow - -           Osmin Pose - -           Seated flexion stretch 4x30" -             -             -                            Modalities             IFC with MHP - 15' mhp only

## 2021-09-22 ENCOUNTER — OFFICE VISIT (OUTPATIENT)
Dept: PHYSICAL THERAPY | Facility: CLINIC | Age: 71
End: 2021-09-22
Payer: MEDICARE

## 2021-09-22 DIAGNOSIS — G89.29 CHRONIC RIGHT-SIDED LOW BACK PAIN WITHOUT SCIATICA: Primary | ICD-10-CM

## 2021-09-22 DIAGNOSIS — S39.012A STRAIN OF LUMBAR REGION, INITIAL ENCOUNTER: ICD-10-CM

## 2021-09-22 DIAGNOSIS — M54.50 CHRONIC RIGHT-SIDED LOW BACK PAIN WITHOUT SCIATICA: Primary | ICD-10-CM

## 2021-09-22 PROCEDURE — 97140 MANUAL THERAPY 1/> REGIONS: CPT | Performed by: PHYSICAL THERAPIST

## 2021-09-22 PROCEDURE — 97110 THERAPEUTIC EXERCISES: CPT | Performed by: PHYSICAL THERAPIST

## 2021-09-22 NOTE — PROGRESS NOTES
Daily Note     Today's date: 2021  Patient name: Montserrat Esquivel  : 1950  MRN: [id number]  Referring provider: Darroll Sandifer, *  Dx:   Encounter Diagnosis     ICD-10-CM    1  Chronic right-sided low back pain without sciatica  M54 5     G89 29    2  Strain of lumbar region, initial encounter  S39 012A                   Subjective: Patient reports she is actually feeling a little better today, after yesterday's therapy session  Objective: See treatment diary below    Pt  Was seen by SONIA Capellan under the direct supervision of Momo Neff PT, DPT  Assessment: Michell Tamayo tolerated today's treatment session well  She was progressed with mobility exercises with minimal complaints of discomfort throughout  She responds well to manual therapy to decrease irritability and muscle guarding  She shows good motivation towards goals and would benefit from continued skilled therapy services to address deficits and improve function  Plan: Continue per plan of care  Progress treatment as tolerated         Precautions: None    Daily Treatment Diary     Manual            Pelvic Rocking - 5'           Sacral Rocking - -           L/S STM 20' 5' 10'          Piriformis TpR - 5'                            Exercise Diary            Warm up  - [facility] L1 10' [facility] L1 10'          LTR - 10x10" 10x10"          SKTC - 10x10" 10x10"          TrA with gluteal iso - 10x5" mod TC 20x5"          PPT - - -          Marches with TrA - - 20x          Clamshells - - 10x ea          Ball squeeze - - 5"x20          90/90 stretch 10x5" 10x10" 10x10"          Piriformis Stretch 4x30" 4x30" 4x30"          Squats with TrA - - -          PB marches - - -          Bridges - - -          Cat/Cow - - 10x          Osmin Pose - - def          Seated flexion stretch 4x30" - 10"x10            -             -                            Modalities            IFC with MHP - 15' mhp only -

## 2021-09-28 ENCOUNTER — OFFICE VISIT (OUTPATIENT)
Dept: PHYSICAL THERAPY | Facility: CLINIC | Age: 71
End: 2021-09-28
Payer: MEDICARE

## 2021-09-28 DIAGNOSIS — M54.50 CHRONIC RIGHT-SIDED LOW BACK PAIN WITHOUT SCIATICA: Primary | ICD-10-CM

## 2021-09-28 DIAGNOSIS — G89.29 CHRONIC RIGHT-SIDED LOW BACK PAIN WITHOUT SCIATICA: Primary | ICD-10-CM

## 2021-09-28 DIAGNOSIS — S39.012A STRAIN OF LUMBAR REGION, INITIAL ENCOUNTER: ICD-10-CM

## 2021-09-28 PROCEDURE — 97110 THERAPEUTIC EXERCISES: CPT

## 2021-09-28 PROCEDURE — 97140 MANUAL THERAPY 1/> REGIONS: CPT

## 2021-09-28 NOTE — PROGRESS NOTES
Daily Note     Today's date: 2021  Patient name: Rekha Mathews  : 1950  MRN: [id number]  Referring provider: Addis Gilliland, *  Dx:   Encounter Diagnosis     ICD-10-CM    1  Chronic right-sided low back pain without sciatica  M54 5     G89 29    2  Strain of lumbar region, initial encounter  S39 012A                   Subjective: Patient reports pain is decreasing  Reports was able to paint her shed without any problem  Objective: See treatment diary below      Assessment: Rekha Mathews continues with decreasing low back symptoms  She requires significant cueing for proper execution of exercises  ROM appears to be improved  Continued PT should benefit  Plan: Continue per plan of care        Precautions: None    Daily Treatment Diary     Manual           Pelvic Rocking - 5'           Sacral Rocking - -           L/S STM 20' 5' 10' 10'         Piriformis TpR - 5'                            Exercise Diary           Warm up  - [facility] L1 10' [facility] L1 10' [facility] L1 10'         LTR - 10x10" 10x10" 10"x10         SKTC - 10x10" 10x10" 10x10"         TrA with gluteal iso - 10x5" mod TC 20x5" 20x5"         PPT - - -          Marches with TrA - - 20x 20x         Clamshells - - 10x ea held         Ball squeeze - - 5"x20 held         90/90 stretch 10x5" 10x10" 10x10" 10x10"         Piriformis Stretch 4x30" 4x30" 4x30" 4x30"         Squats with TrA - - - x10         PB marches - - -          Bridges - - - 20x         Cat/Cow - - 10x 20x         Osmin Pose - - def -         Seated flexion stretch 4x30" - 10"x10 10"x10         PB roll  -  10"x10           -                            Modalities           IFC with MHP - 15' mhp only - def

## 2021-09-30 ENCOUNTER — OFFICE VISIT (OUTPATIENT)
Dept: PHYSICAL THERAPY | Facility: CLINIC | Age: 71
End: 2021-09-30
Payer: MEDICARE

## 2021-09-30 DIAGNOSIS — S39.012A STRAIN OF LUMBAR REGION, INITIAL ENCOUNTER: ICD-10-CM

## 2021-09-30 DIAGNOSIS — M54.50 CHRONIC RIGHT-SIDED LOW BACK PAIN WITHOUT SCIATICA: Primary | ICD-10-CM

## 2021-09-30 DIAGNOSIS — G89.29 CHRONIC RIGHT-SIDED LOW BACK PAIN WITHOUT SCIATICA: Primary | ICD-10-CM

## 2021-09-30 PROCEDURE — 97110 THERAPEUTIC EXERCISES: CPT | Performed by: PHYSICAL THERAPIST

## 2021-09-30 NOTE — PROGRESS NOTES
Daily Note     Today's date: 2021  Patient name: Zuleima Adamson  : 1950  MRN: [id number]  Referring provider: Sussy Kruse, *  Dx:   Encounter Diagnosis     ICD-10-CM    1  Chronic right-sided low back pain without sciatica  M54 5     G89 29    2  Strain of lumbar region, initial encounter  S39 012A                   Subjective: Patient reports she is feeling pretty good, with no complaints of pain or discomfort  Objective: See treatment diary below    Pt  Was seen by SONIA Robert under the direct supervision of Laci Elliott, PT, DPT  Assessment: Malathi Rizo tolerated today's treatment session well  She has shown good improvement with mobility and core stability  She experiences minimal discomfort throughout and requires moderate cueing for proper technique  She shows good effort and would benefit from continued skilled therapy services to address deficits and improve function  Plan: Continue per plan of care  Progress treatment as tolerated         Precautions: None    Daily Treatment Diary       Manual          Pelvic Rocking - 5'           Sacral Rocking - -           L/S STM 20' 5' 10' 10' 10'        Piriformis TpR - 5'                            Exercise Diary          Warm up  - [facility] L1 10' [facility] L1 10' [facility] L1 10' [facility] L1 10'        LTR - 10x10" 10x10" 10"x10 10x10"        SKTC - 10x10" 10x10" 10x10" 10x10"        TrA with gluteal iso - 10x5" mod TC 20x5" 20x5" 20x5"        PPT - - -          Marches with TrA - - 20x 20x 20x        Clamshells - - 10x ea held -        Ball squeeze - - 5"x20 held 5"x20        90/90 stretch 10x5" 10x10" 10x10" 10x10" 10x10"        Piriformis Stretch 4x30" 4x30" 4x30" 4x30" 4x30"        Squats with TrA - - - x10 15x        PB marches - - -  -        Bridges - - - 20x 20x        Cat/Cow - - 10x 20x 20x        Osmin Pose - - def - -        Seated flexion stretch 4x30" - 10"x10 10"x10 10"x10        PB roll -  10"x10 10"x10          -                            Modalities  9/16 9/21 9/22 9/28 9/30        IFC with P - 15' mhp only - def -

## 2021-10-05 ENCOUNTER — OFFICE VISIT (OUTPATIENT)
Dept: PHYSICAL THERAPY | Facility: CLINIC | Age: 71
End: 2021-10-05
Payer: MEDICARE

## 2021-10-05 DIAGNOSIS — G89.29 CHRONIC RIGHT-SIDED LOW BACK PAIN WITHOUT SCIATICA: Primary | ICD-10-CM

## 2021-10-05 DIAGNOSIS — M54.50 CHRONIC RIGHT-SIDED LOW BACK PAIN WITHOUT SCIATICA: Primary | ICD-10-CM

## 2021-10-05 DIAGNOSIS — S39.012A STRAIN OF LUMBAR REGION, INITIAL ENCOUNTER: ICD-10-CM

## 2021-10-05 PROCEDURE — 97140 MANUAL THERAPY 1/> REGIONS: CPT | Performed by: PHYSICAL THERAPIST

## 2021-10-05 PROCEDURE — 97110 THERAPEUTIC EXERCISES: CPT | Performed by: PHYSICAL THERAPIST

## 2021-10-07 ENCOUNTER — OFFICE VISIT (OUTPATIENT)
Dept: PHYSICAL THERAPY | Facility: CLINIC | Age: 71
End: 2021-10-07
Payer: MEDICARE

## 2021-10-07 DIAGNOSIS — S39.012A STRAIN OF LUMBAR REGION, INITIAL ENCOUNTER: ICD-10-CM

## 2021-10-07 DIAGNOSIS — M54.50 CHRONIC RIGHT-SIDED LOW BACK PAIN WITHOUT SCIATICA: Primary | ICD-10-CM

## 2021-10-07 DIAGNOSIS — G89.29 CHRONIC RIGHT-SIDED LOW BACK PAIN WITHOUT SCIATICA: Primary | ICD-10-CM

## 2021-10-07 PROCEDURE — 97112 NEUROMUSCULAR REEDUCATION: CPT | Performed by: PHYSICAL THERAPIST

## 2021-10-07 PROCEDURE — 97110 THERAPEUTIC EXERCISES: CPT | Performed by: PHYSICAL THERAPIST

## 2021-10-07 PROCEDURE — 97140 MANUAL THERAPY 1/> REGIONS: CPT | Performed by: PHYSICAL THERAPIST

## 2021-10-11 ENCOUNTER — APPOINTMENT (EMERGENCY)
Dept: CT IMAGING | Facility: HOSPITAL | Age: 71
End: 2021-10-11
Payer: MEDICARE

## 2021-10-11 ENCOUNTER — HOSPITAL ENCOUNTER (EMERGENCY)
Facility: HOSPITAL | Age: 71
Discharge: HOME | End: 2021-10-11
Attending: STUDENT IN AN ORGANIZED HEALTH CARE EDUCATION/TRAINING PROGRAM
Payer: MEDICARE

## 2021-10-11 VITALS
RESPIRATION RATE: 18 BRPM | WEIGHT: 188.49 LBS | DIASTOLIC BLOOD PRESSURE: 95 MMHG | OXYGEN SATURATION: 96 % | HEART RATE: 72 BPM | BODY MASS INDEX: 32.18 KG/M2 | SYSTOLIC BLOOD PRESSURE: 152 MMHG | TEMPERATURE: 98.6 F | HEIGHT: 64 IN

## 2021-10-11 DIAGNOSIS — M54.50 ACUTE RIGHT-SIDED LOW BACK PAIN WITHOUT SCIATICA: Primary | ICD-10-CM

## 2021-10-11 DIAGNOSIS — M79.18 RIGHT BUTTOCK PAIN: ICD-10-CM

## 2021-10-11 LAB
ANION GAP SERPL CALCULATED.3IONS-SCNC: 8 MMOL/L (ref 4–13)
BASOPHILS # BLD AUTO: 0.05 THOUSANDS/ΜL (ref 0–0.1)
BASOPHILS NFR BLD AUTO: 1 % (ref 0–1)
BILIRUB UR QL STRIP: NEGATIVE
BUN SERPL-MCNC: 16 MG/DL (ref 5–25)
CALCIUM SERPL-MCNC: 9.2 MG/DL (ref 8.3–10.1)
CHLORIDE SERPL-SCNC: 104 MMOL/L (ref 100–108)
CLARITY UR: CLEAR
CO2 SERPL-SCNC: 28 MMOL/L (ref 21–32)
COLOR UR: YELLOW
CREAT SERPL-MCNC: 1.12 MG/DL (ref 0.6–1.3)
EOSINOPHIL # BLD AUTO: 0.25 THOUSAND/ΜL (ref 0–0.61)
EOSINOPHIL NFR BLD AUTO: 3 % (ref 0–6)
ERYTHROCYTE [DISTWIDTH] IN BLOOD BY AUTOMATED COUNT: 12.3 % (ref 11.6–15.1)
GFR SERPL CREATININE-BSD FRML MDRD: 50 ML/MIN/1.73SQ M
GLUCOSE SERPL-MCNC: 128 MG/DL (ref 65–140)
GLUCOSE UR STRIP-MCNC: NEGATIVE MG/DL
HCT VFR BLD AUTO: 44.7 % (ref 34.8–46.1)
HGB BLD-MCNC: 14.8 G/DL (ref 11.5–15.4)
HGB UR QL STRIP.AUTO: NEGATIVE
IMM GRANULOCYTES # BLD AUTO: 0.02 THOUSAND/UL (ref 0–0.2)
IMM GRANULOCYTES NFR BLD AUTO: 0 % (ref 0–2)
KETONES UR STRIP-MCNC: NEGATIVE MG/DL
LEUKOCYTE ESTERASE UR QL STRIP: NEGATIVE
LYMPHOCYTES # BLD AUTO: 2.42 THOUSANDS/ΜL (ref 0.6–4.47)
LYMPHOCYTES NFR BLD AUTO: 25 % (ref 14–44)
MCH RBC QN AUTO: 32.3 PG (ref 26.8–34.3)
MCHC RBC AUTO-ENTMCNC: 33.1 G/DL (ref 31.4–37.4)
MCV RBC AUTO: 98 FL (ref 82–98)
MONOCYTES # BLD AUTO: 0.53 THOUSAND/ΜL (ref 0.17–1.22)
MONOCYTES NFR BLD AUTO: 6 % (ref 4–12)
NEUTROPHILS # BLD AUTO: 6.25 THOUSANDS/ΜL (ref 1.85–7.62)
NEUTS SEG NFR BLD AUTO: 65 % (ref 43–75)
NITRITE UR QL STRIP: NEGATIVE
NRBC BLD AUTO-RTO: 0 /100 WBCS
PH UR STRIP.AUTO: 5.5 [PH]
PLATELET # BLD AUTO: 247 THOUSANDS/UL (ref 149–390)
PMV BLD AUTO: 10 FL (ref 8.9–12.7)
POTASSIUM SERPL-SCNC: 4.1 MMOL/L (ref 3.5–5.3)
PROT UR STRIP-MCNC: NEGATIVE MG/DL
RBC # BLD AUTO: 4.58 MILLION/UL (ref 3.81–5.12)
SODIUM SERPL-SCNC: 140 MMOL/L (ref 136–145)
SP GR UR STRIP.AUTO: >=1.03 (ref 1–1.03)
UROBILINOGEN UR QL STRIP.AUTO: 0.2 E.U./DL
WBC # BLD AUTO: 9.52 THOUSAND/UL (ref 4.31–10.16)

## 2021-10-11 PROCEDURE — 36415 COLL VENOUS BLD VENIPUNCTURE: CPT | Performed by: STUDENT IN AN ORGANIZED HEALTH CARE EDUCATION/TRAINING PROGRAM

## 2021-10-11 PROCEDURE — 99284 EMERGENCY DEPT VISIT MOD MDM: CPT | Performed by: STUDENT IN AN ORGANIZED HEALTH CARE EDUCATION/TRAINING PROGRAM

## 2021-10-11 PROCEDURE — 1123F ACP DISCUSS/DSCN MKR DOCD: CPT | Performed by: STUDENT IN AN ORGANIZED HEALTH CARE EDUCATION/TRAINING PROGRAM

## 2021-10-11 PROCEDURE — 81003 URINALYSIS AUTO W/O SCOPE: CPT | Performed by: STUDENT IN AN ORGANIZED HEALTH CARE EDUCATION/TRAINING PROGRAM

## 2021-10-11 PROCEDURE — 85025 COMPLETE CBC W/AUTO DIFF WBC: CPT | Performed by: STUDENT IN AN ORGANIZED HEALTH CARE EDUCATION/TRAINING PROGRAM

## 2021-10-11 PROCEDURE — 80048 BASIC METABOLIC PNL TOTAL CA: CPT | Performed by: STUDENT IN AN ORGANIZED HEALTH CARE EDUCATION/TRAINING PROGRAM

## 2021-10-11 PROCEDURE — 74177 CT ABD & PELVIS W/CONTRAST: CPT

## 2021-10-11 PROCEDURE — 99284 EMERGENCY DEPT VISIT MOD MDM: CPT

## 2021-10-11 RX ADMIN — IOHEXOL 100 ML: 350 INJECTION, SOLUTION INTRAVENOUS at 13:02

## 2021-10-22 ENCOUNTER — OFFICE VISIT (OUTPATIENT)
Dept: FAMILY MEDICINE CLINIC | Facility: CLINIC | Age: 71
End: 2021-10-22
Payer: MEDICARE

## 2021-10-22 VITALS
HEART RATE: 85 BPM | SYSTOLIC BLOOD PRESSURE: 122 MMHG | WEIGHT: 190 LBS | BODY MASS INDEX: 32.44 KG/M2 | HEIGHT: 64 IN | DIASTOLIC BLOOD PRESSURE: 72 MMHG | TEMPERATURE: 98 F | OXYGEN SATURATION: 98 %

## 2021-10-22 DIAGNOSIS — I10 ESSENTIAL HYPERTENSION: ICD-10-CM

## 2021-10-22 DIAGNOSIS — B02.29 POST HERPETIC NEURALGIA: ICD-10-CM

## 2021-10-22 DIAGNOSIS — E11.65 TYPE 2 DIABETES MELLITUS WITH HYPERGLYCEMIA, WITHOUT LONG-TERM CURRENT USE OF INSULIN (HCC): Primary | ICD-10-CM

## 2021-10-22 DIAGNOSIS — M54.31 BILATERAL SCIATICA: ICD-10-CM

## 2021-10-22 DIAGNOSIS — F33.0 MILD EPISODE OF RECURRENT MAJOR DEPRESSIVE DISORDER (HCC): ICD-10-CM

## 2021-10-22 DIAGNOSIS — E78.49 OTHER HYPERLIPIDEMIA: ICD-10-CM

## 2021-10-22 DIAGNOSIS — K21.9 GASTROESOPHAGEAL REFLUX DISEASE WITHOUT ESOPHAGITIS: ICD-10-CM

## 2021-10-22 DIAGNOSIS — Z78.0 MENOPAUSE: ICD-10-CM

## 2021-10-22 DIAGNOSIS — G43.909 MIGRAINE WITHOUT STATUS MIGRAINOSUS, NOT INTRACTABLE, UNSPECIFIED MIGRAINE TYPE: ICD-10-CM

## 2021-10-22 DIAGNOSIS — M54.32 BILATERAL SCIATICA: ICD-10-CM

## 2021-10-22 DIAGNOSIS — I10 PRIMARY HYPERTENSION: ICD-10-CM

## 2021-10-22 DIAGNOSIS — Z12.4 SCREENING FOR CERVICAL CANCER: ICD-10-CM

## 2021-10-22 LAB — SL AMB POCT HEMOGLOBIN AIC: 6.6 (ref ?–6.5)

## 2021-10-22 PROCEDURE — 83036 HEMOGLOBIN GLYCOSYLATED A1C: CPT | Performed by: NURSE PRACTITIONER

## 2021-10-22 PROCEDURE — 99213 OFFICE O/P EST LOW 20 MIN: CPT | Performed by: NURSE PRACTITIONER

## 2021-10-22 PROCEDURE — 82043 UR ALBUMIN QUANTITATIVE: CPT | Performed by: NURSE PRACTITIONER

## 2021-10-22 PROCEDURE — 82570 ASSAY OF URINE CREATININE: CPT | Performed by: NURSE PRACTITIONER

## 2021-10-22 RX ORDER — ATORVASTATIN CALCIUM 20 MG/1
20 TABLET, FILM COATED ORAL
Qty: 90 TABLET | Refills: 1 | Status: SHIPPED | OUTPATIENT
Start: 2021-10-22 | End: 2022-02-25 | Stop reason: SDUPTHER

## 2021-10-22 RX ORDER — ESCITALOPRAM OXALATE 20 MG/1
20 TABLET ORAL DAILY
Qty: 90 TABLET | Refills: 1 | Status: SHIPPED | OUTPATIENT
Start: 2021-10-22 | End: 2022-07-21

## 2021-10-22 RX ORDER — TRAZODONE HYDROCHLORIDE 100 MG/1
100 TABLET ORAL
Qty: 90 TABLET | Refills: 1 | Status: SHIPPED | OUTPATIENT
Start: 2021-10-22

## 2021-10-22 RX ORDER — GABAPENTIN 300 MG/1
300 CAPSULE ORAL 3 TIMES DAILY
Qty: 270 CAPSULE | Refills: 1 | Status: SHIPPED | OUTPATIENT
Start: 2021-10-22 | End: 2022-02-25 | Stop reason: SDUPTHER

## 2021-10-22 RX ORDER — LANSOPRAZOLE 15 MG/1
15 CAPSULE, DELAYED RELEASE ORAL DAILY
Qty: 90 CAPSULE | Refills: 1 | Status: SHIPPED | OUTPATIENT
Start: 2021-10-22 | End: 2022-07-14

## 2021-10-22 RX ORDER — AMLODIPINE BESYLATE 2.5 MG/1
5 TABLET ORAL DAILY
Qty: 180 TABLET | Refills: 1 | Status: SHIPPED | OUTPATIENT
Start: 2021-10-22 | End: 2022-02-25 | Stop reason: SDUPTHER

## 2021-10-22 RX ORDER — VENLAFAXINE HYDROCHLORIDE 75 MG/1
75 CAPSULE, EXTENDED RELEASE ORAL DAILY
Qty: 90 CAPSULE | Refills: 1 | Status: SHIPPED | OUTPATIENT
Start: 2021-10-22

## 2021-10-22 RX ORDER — METFORMIN HYDROCHLORIDE 500 MG/1
500 TABLET, EXTENDED RELEASE ORAL
Qty: 90 TABLET | Refills: 1 | Status: SHIPPED | OUTPATIENT
Start: 2021-10-22 | End: 2022-02-25 | Stop reason: SDUPTHER

## 2021-10-23 LAB
CREAT UR-MCNC: 180 MG/DL
MICROALBUMIN UR-MCNC: 35.5 MG/L (ref 0–20)
MICROALBUMIN/CREAT 24H UR: 20 MG/G CREATININE (ref 0–30)

## 2021-12-01 ENCOUNTER — APPOINTMENT (OUTPATIENT)
Dept: LAB | Facility: CLINIC | Age: 71
End: 2021-12-01
Payer: MEDICARE

## 2021-12-01 DIAGNOSIS — G40.109 LOCALIZATION-RELATED (FOCAL) (PARTIAL) SYMPTOMATIC EPILEPSY AND EPILEPTIC SYNDROMES WITH SIMPLE PARTIAL SEIZURES, NOT INTRACTABLE, WITHOUT STATUS EPILEPTICUS (HCC): ICD-10-CM

## 2021-12-01 LAB
ALBUMIN SERPL BCP-MCNC: 3.3 G/DL (ref 3.5–5)
ALP SERPL-CCNC: 74 U/L (ref 46–116)
ALT SERPL W P-5'-P-CCNC: 16 U/L (ref 12–78)
ANION GAP SERPL CALCULATED.3IONS-SCNC: 5 MMOL/L (ref 4–13)
AST SERPL W P-5'-P-CCNC: 8 U/L (ref 5–45)
BILIRUB SERPL-MCNC: 0.75 MG/DL (ref 0.2–1)
BUN SERPL-MCNC: 13 MG/DL (ref 5–25)
CALCIUM ALBUM COR SERPL-MCNC: 9.4 MG/DL (ref 8.3–10.1)
CALCIUM SERPL-MCNC: 8.8 MG/DL (ref 8.3–10.1)
CHLORIDE SERPL-SCNC: 113 MMOL/L (ref 100–108)
CO2 SERPL-SCNC: 24 MMOL/L (ref 21–32)
CREAT SERPL-MCNC: 1.1 MG/DL (ref 0.6–1.3)
GFR SERPL CREATININE-BSD FRML MDRD: 51 ML/MIN/1.73SQ M
GLUCOSE P FAST SERPL-MCNC: 130 MG/DL (ref 65–99)
POTASSIUM SERPL-SCNC: 4.1 MMOL/L (ref 3.5–5.3)
PROT SERPL-MCNC: 7.5 G/DL (ref 6.4–8.2)
SODIUM SERPL-SCNC: 142 MMOL/L (ref 136–145)

## 2021-12-01 PROCEDURE — 80053 COMPREHEN METABOLIC PANEL: CPT

## 2021-12-01 PROCEDURE — 80201 ASSAY OF TOPIRAMATE: CPT

## 2021-12-03 LAB — TOPIRAMATE SERPL-MCNC: 18.3 UG/ML (ref 2–25)

## 2021-12-16 ENCOUNTER — OFFICE VISIT (OUTPATIENT)
Dept: OBGYN CLINIC | Facility: CLINIC | Age: 71
End: 2021-12-16
Payer: MEDICARE

## 2021-12-16 ENCOUNTER — TELEPHONE (OUTPATIENT)
Dept: OBGYN CLINIC | Facility: CLINIC | Age: 71
End: 2021-12-16

## 2021-12-16 VITALS
TEMPERATURE: 96.7 F | HEIGHT: 64 IN | DIASTOLIC BLOOD PRESSURE: 82 MMHG | WEIGHT: 183 LBS | BODY MASS INDEX: 31.24 KG/M2 | SYSTOLIC BLOOD PRESSURE: 124 MMHG

## 2021-12-16 DIAGNOSIS — Z01.411 ENCNTR FOR GYN EXAM (GENERAL) (ROUTINE) W ABNORMAL FINDINGS: ICD-10-CM

## 2021-12-16 DIAGNOSIS — Z12.31 ENCOUNTER FOR SCREENING MAMMOGRAM FOR BREAST CANCER: ICD-10-CM

## 2021-12-16 DIAGNOSIS — M85.852 OSTEOPENIA OF NECK OF LEFT FEMUR: ICD-10-CM

## 2021-12-16 DIAGNOSIS — Z78.0 MENOPAUSE: ICD-10-CM

## 2021-12-16 DIAGNOSIS — Z12.4 CERVICAL CANCER SCREENING: ICD-10-CM

## 2021-12-16 DIAGNOSIS — N95.1 HOT FLASHES DUE TO MENOPAUSE: Primary | ICD-10-CM

## 2021-12-16 PROCEDURE — G0145 SCR C/V CYTO,THINLAYER,RESCR: HCPCS | Performed by: OBSTETRICS & GYNECOLOGY

## 2021-12-16 PROCEDURE — G0101 CA SCREEN;PELVIC/BREAST EXAM: HCPCS | Performed by: OBSTETRICS & GYNECOLOGY

## 2021-12-16 PROCEDURE — G0476 HPV COMBO ASSAY CA SCREEN: HCPCS | Performed by: OBSTETRICS & GYNECOLOGY

## 2021-12-18 LAB
HPV HR 12 DNA CVX QL NAA+PROBE: NEGATIVE
HPV16 DNA CVX QL NAA+PROBE: NEGATIVE
HPV18 DNA CVX QL NAA+PROBE: NEGATIVE

## 2021-12-27 LAB
LAB AP GYN PRIMARY INTERPRETATION: NORMAL
Lab: NORMAL

## 2022-01-25 ENCOUNTER — OFFICE VISIT (OUTPATIENT)
Dept: FAMILY MEDICINE CLINIC | Facility: CLINIC | Age: 72
End: 2022-01-25
Payer: MEDICARE

## 2022-01-25 VITALS
SYSTOLIC BLOOD PRESSURE: 130 MMHG | TEMPERATURE: 97.5 F | HEIGHT: 64 IN | HEART RATE: 74 BPM | WEIGHT: 190.1 LBS | DIASTOLIC BLOOD PRESSURE: 72 MMHG | BODY MASS INDEX: 32.46 KG/M2 | OXYGEN SATURATION: 97 %

## 2022-01-25 DIAGNOSIS — F33.0 MILD EPISODE OF RECURRENT MAJOR DEPRESSIVE DISORDER (HCC): ICD-10-CM

## 2022-01-25 DIAGNOSIS — E11.65 TYPE 2 DIABETES MELLITUS WITH HYPERGLYCEMIA, WITHOUT LONG-TERM CURRENT USE OF INSULIN (HCC): Primary | ICD-10-CM

## 2022-01-25 DIAGNOSIS — I10 PRIMARY HYPERTENSION: ICD-10-CM

## 2022-01-25 DIAGNOSIS — R41.3 MEMORY DIFFICULTY: ICD-10-CM

## 2022-01-25 DIAGNOSIS — D43.2 GANGLIOGLIOMA OF BRAIN (HCC): ICD-10-CM

## 2022-01-25 DIAGNOSIS — K21.9 GASTROESOPHAGEAL REFLUX DISEASE WITHOUT ESOPHAGITIS: ICD-10-CM

## 2022-01-25 LAB — SL AMB POCT HEMOGLOBIN AIC: 7 (ref ?–6.5)

## 2022-01-25 PROCEDURE — 83036 HEMOGLOBIN GLYCOSYLATED A1C: CPT | Performed by: NURSE PRACTITIONER

## 2022-01-25 PROCEDURE — 99213 OFFICE O/P EST LOW 20 MIN: CPT | Performed by: NURSE PRACTITIONER

## 2022-01-25 NOTE — PROGRESS NOTES
Assessment/Plan:       Diagnoses and all orders for this visit:    Type 2 diabetes mellitus with hyperglycemia, without long-term current use of insulin ([facility] [address] )  -     Ambulatory Referral to Ophthalmology; Future  -     POCT hemoglobin A1c    Mild episode of recurrent major depressive disorder (HCC)    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[facility] [address] )    Primary hypertension    Gastroesophageal reflux disease without esophagitis    Memory difficulty      Discussed her memory issues - does not remember certain items she does not hold important but still knows locations of objects, names etc   - will monitor  HTN and GERD controlled   will call with new pharmacy name  HA1C increased to 7 0 - will monitor - recent stressful time with deaths in the family/friends  Subjective:      Patient ID: Jairo Montelongo is a 73 y o  female  Here today for a follow-up  Hx of T2DM, ganglioma of the brain, HTN, GERD  Chronic issues controlled   states she has some issues remembering items that are not important to her  The following portions of the patient's history were reviewed and updated as appropriate: allergies, current medications, past family history, past medical history, past social history, past surgical history and problem list     Review of Systems   Constitutional: Negative  HENT: Negative  Respiratory: Negative  Cardiovascular: Negative  Gastrointestinal: Negative  Neurological: Negative  All other systems reviewed and are negative  Objective:      /72 (BP Location: Right arm, Patient Position: Sitting)   Pulse 74   Temp 97 5 °F (36 4 °C)   Ht 5' 4" (1 626 m)   Wt 86 2 kg (190 lb 1 6 oz)   SpO2 97%   BMI 32 63 kg/m²          Physical Exam  Constitutional:       Appearance: Normal appearance  Cardiovascular:      Rate and Rhythm: Normal rate and regular rhythm  Heart sounds: Normal heart sounds     Pulmonary:      Effort: Pulmonary effort is normal  No respiratory distress  Breath sounds: Normal breath sounds  Neurological:      Mental Status: She is alert and oriented to person, place, and time  Psychiatric:         Mood and Affect: Mood normal          Behavior: Behavior normal          Thought Content:  Thought content normal          Judgment: Judgment normal

## 2022-02-25 ENCOUNTER — TELEPHONE (OUTPATIENT)
Dept: OBGYN CLINIC | Facility: CLINIC | Age: 72
End: 2022-02-25

## 2022-02-25 DIAGNOSIS — N95.1 HOT FLASHES DUE TO MENOPAUSE: Primary | ICD-10-CM

## 2022-02-25 DIAGNOSIS — B02.29 POST HERPETIC NEURALGIA: ICD-10-CM

## 2022-02-25 DIAGNOSIS — E78.49 OTHER HYPERLIPIDEMIA: ICD-10-CM

## 2022-02-25 DIAGNOSIS — I10 ESSENTIAL HYPERTENSION: ICD-10-CM

## 2022-02-25 DIAGNOSIS — E11.65 TYPE 2 DIABETES MELLITUS WITH HYPERGLYCEMIA, WITHOUT LONG-TERM CURRENT USE OF INSULIN (HCC): ICD-10-CM

## 2022-02-25 RX ORDER — ATORVASTATIN CALCIUM 20 MG/1
20 TABLET, FILM COATED ORAL
Qty: 90 TABLET | Refills: 1 | Status: SHIPPED | OUTPATIENT
Start: 2022-02-25 | End: 2022-07-28

## 2022-02-25 RX ORDER — GABAPENTIN 300 MG/1
300 CAPSULE ORAL 3 TIMES DAILY
Qty: 270 CAPSULE | Refills: 1 | Status: SHIPPED | OUTPATIENT
Start: 2022-02-25 | End: 2022-07-28

## 2022-02-25 RX ORDER — METFORMIN HYDROCHLORIDE 500 MG/1
500 TABLET, EXTENDED RELEASE ORAL
Qty: 90 TABLET | Refills: 1 | Status: SHIPPED | OUTPATIENT
Start: 2022-02-25 | End: 2022-07-28

## 2022-02-25 RX ORDER — AMLODIPINE BESYLATE 2.5 MG/1
5 TABLET ORAL DAILY
Qty: 180 TABLET | Refills: 1 | Status: SHIPPED | OUTPATIENT
Start: 2022-02-25 | End: 2022-07-28

## 2022-02-25 NOTE — TELEPHONE ENCOUNTER
Pt was seen on 12/16/2021  Her  called requesting a refill on the medication estrogen, conjugated,-medroxyprogesterone (PREMPRO) 0 3-1 5 MG per tablet but would like a paper slip if possible due to it not being covered under her insurance  Please advise

## 2022-03-07 ENCOUNTER — TELEPHONE (OUTPATIENT)
Dept: OBGYN CLINIC | Facility: CLINIC | Age: 72
End: 2022-03-07

## 2022-03-07 NOTE — TELEPHONE ENCOUNTER
The patients  stated he is okay with her taking the 0 45mg every 3 days as long as it is okay with you  States no script is needed since he will keep it every 3 days

## 2022-03-07 NOTE — TELEPHONE ENCOUNTER
The patients  called and stated that the incorrect medication may have been called in as she was previously taking 0 3-1 5mg of Prempro every other day and the new presription says 0 45-1 5mg every day  Would like to know the correct way to take medication  Please advise

## 2022-04-25 ENCOUNTER — OFFICE VISIT (OUTPATIENT)
Dept: FAMILY MEDICINE CLINIC | Facility: CLINIC | Age: 72
End: 2022-04-25
Payer: MEDICARE

## 2022-04-25 VITALS
HEIGHT: 64 IN | BODY MASS INDEX: 32.47 KG/M2 | TEMPERATURE: 98 F | HEART RATE: 66 BPM | OXYGEN SATURATION: 96 % | DIASTOLIC BLOOD PRESSURE: 72 MMHG | SYSTOLIC BLOOD PRESSURE: 122 MMHG | WEIGHT: 190.2 LBS

## 2022-04-25 DIAGNOSIS — I10 PRIMARY HYPERTENSION: ICD-10-CM

## 2022-04-25 DIAGNOSIS — E11.9 ENCOUNTER FOR DIABETIC FOOT EXAM (HCC): ICD-10-CM

## 2022-04-25 DIAGNOSIS — G43.909 MIGRAINE WITHOUT STATUS MIGRAINOSUS, NOT INTRACTABLE, UNSPECIFIED MIGRAINE TYPE: ICD-10-CM

## 2022-04-25 DIAGNOSIS — D43.2 GANGLIOGLIOMA OF BRAIN (HCC): ICD-10-CM

## 2022-04-25 DIAGNOSIS — E78.49 OTHER HYPERLIPIDEMIA: ICD-10-CM

## 2022-04-25 DIAGNOSIS — E11.65 TYPE 2 DIABETES MELLITUS WITH HYPERGLYCEMIA, WITHOUT LONG-TERM CURRENT USE OF INSULIN (HCC): Primary | ICD-10-CM

## 2022-04-25 PROBLEM — G40.909 SEIZURE DISORDER (HCC): Status: ACTIVE | Noted: 2022-04-25

## 2022-04-25 LAB — SL AMB POCT HEMOGLOBIN AIC: 7 (ref ?–6.5)

## 2022-04-25 PROCEDURE — 83036 HEMOGLOBIN GLYCOSYLATED A1C: CPT | Performed by: NURSE PRACTITIONER

## 2022-04-25 PROCEDURE — 99213 OFFICE O/P EST LOW 20 MIN: CPT | Performed by: NURSE PRACTITIONER

## 2022-04-25 RX ORDER — TOPIRAMATE 100 MG/1
150 TABLET, FILM COATED ORAL 2 TIMES DAILY
COMMUNITY
Start: 2022-03-28

## 2022-04-25 NOTE — PROGRESS NOTES
Assessment/Plan:       Diagnoses and all orders for this visit:    Type 2 diabetes mellitus with hyperglycemia, without long-term current use of insulin (HCC)  -     POCT hemoglobin A1c    Primary hypertension    Migraine without status migrainosus, not intractable, unspecified migraine type    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HCC)    BMI 32 0-32 9,adult    Other hyperlipidemia    Encounter for diabetic foot exam ([facility] [address] )    Other orders  -     topiramate (TOPAMAX) 100 mg tablet; Take 150 mg by mouth 2 (two) times a day       Continue with current medication regimen  HA1C continues to remain at 7 0  Subjective:      Patient ID: Vaishali Marcum is a 73 y o  female  Here today for a follow-up - hx of T2DM - controlled with medication  Hx of HLD controlled with medication  Hx of HTN - controlled with medication  She reports no new issues, no headaches  Remains active  The following portions of the patient's history were reviewed and updated as appropriate: allergies, current medications, past family history, past medical history, past social history, past surgical history and problem list     Review of Systems   Constitutional: Negative  HENT: Negative  Respiratory: Negative  Cardiovascular: Negative  Gastrointestinal: Negative  Neurological: Negative  All other systems reviewed and are negative  Objective:      /72 (BP Location: Left arm, Patient Position: Sitting)   Pulse 66   Temp 98 °F (36 7 °C)   Ht 5' 4" (1 626 m)   Wt 86 3 kg (190 lb 3 2 oz)   SpO2 96%   BMI 32 65 kg/m²          Physical Exam  Constitutional:       Appearance: Normal appearance  Cardiovascular:      Rate and Rhythm: Normal rate and regular rhythm  Pulses: no weak pulses          Dorsalis pedis pulses are 2+ on the right side and 2+ on the left side  Pulmonary:      Effort: Pulmonary effort is normal       Breath sounds: Normal breath sounds     Feet:      Right foot:      Skin integrity: No ulcer, skin breakdown, erythema, warmth, callus or dry skin  Left foot:      Skin integrity: No ulcer, skin breakdown, erythema, warmth, callus or dry skin  Neurological:      Mental Status: She is alert  Psychiatric:         Mood and Affect: Mood normal          Behavior: Behavior normal          Thought Content: Thought content normal          Judgment: Judgment normal            BMI Counseling: Body mass index is 32 65 kg/m²  The BMI is above normal  Nutrition recommendations include encouraging healthy choices of fruits and vegetables  Exercise recommendations include moderate physical activity 150 minutes/week  Rationale for BMI follow-up plan is due to patient being overweight or obese  Falls Plan of Care: balance, strength, and gait training instructions were provided  Diabetic Foot Exam    Patient's shoes and socks removed  Right Foot/Ankle   Right Foot Inspection  Skin Exam: skin normal and skin intact  No dry skin, no warmth, no callus, no erythema, no maceration, no abnormal color, no pre-ulcer, no ulcer and no callus  Sensory   Monofilament testing: intact    Vascular  The right DP pulse is 2+  Left Foot/Ankle  Left Foot Inspection  Skin Exam: skin normal and skin intact  No dry skin, no warmth, no erythema, no maceration, normal color, no pre-ulcer, no ulcer and no callus  Sensory   Monofilament testing: intact    Vascular  The left DP pulse is 2+       Assign Risk Category  No deformity present  No loss of protective sensation  No weak pulses  Risk: 0

## 2022-07-12 NOTE — PROGRESS NOTES
Assessment/Plan:    Problem List Items Addressed This Visit        Digestive    Sicca syndrome (HCC)       Nervous and Auditory    Seizure disorder ([facility] [address] )      Other Visit Diagnoses     Hot flashes due to menopause    -  Primary    Relevant Medications    estrogen, conjugated,-medroxyprogesterone (PREMPRO) 0 3-1 5 MG per tablet    Menopause            Patient with longstanding history of hormone replacement  Due to her age and risk factors such as hypertension and hyperlipidemia, we are planning on weaning her off this medication  She is doing well on Prempro 0 45 mg every 4 days  She will continue on this until her medications supply has run out  Ideally, she should be on 0 3 mg every 3 days  She will return in the office in 6 months for medication management, sooner if needed  Subjective   Patient ID: Zelalem Dutta is a 81 y o  female  Patient is here for a follow-up  Chief Complaint   Patient presents with    Follow-up     6 month follow up  Patient states hot flashes have resolved  PREMPRO 0 45 every 3 days  She is not having hot flashes,  She has no vaginal bleeding  Menstrual History:  OB History        1    Para   1    Term   1       0    AB   0    Living   1       SAB   0    IAB   0    Ectopic   0    Multiple   0    Live Births   1                  No LMP recorded   Patient is postmenopausal          Past Medical History:   Diagnosis Date    Cervicalgia     Depression     Diabetes mellitus ([facility] [address] )     Esophageal reflux     Hyperlipidemia     Hypertension     Essential    Migraine     Nonspecific abnormal electrocardiogram (ECG)     Sciatica     Seizure disorder (HCC)     Sjogren's syndrome (HCC)        Past Surgical History:   Procedure Laterality Date    CERVICAL POLYPECTOMY      CRANIOTOMY       Left frontal craniotomy for resection of tumor-Dr [facility] [place]     TUBAL LIGATION         Social History     Tobacco Use    Smoking status: Never Smoker    Smokeless tobacco: Never Used   Vaping Use    Vaping Use: Never used   Substance Use Topics    Alcohol use: Never    Drug use: Never       Allergies   Allergen Reactions    Lamotrigine      Other reaction(s): LAMOTRIGINE (LAMICTAL) rash         Current Outpatient Medications:     amLODIPine (NORVASC) 2 5 mg tablet, Take 2 tablets (5 mg total) by mouth daily, Disp: 180 tablet, Rfl: 1    ASPIRIN 81 PO, Take 81 mg by mouth daily, Disp: , Rfl:     atorvastatin (LIPITOR) 20 mg tablet, Take 1 tablet (20 mg total) by mouth daily at bedtime, Disp: 90 tablet, Rfl: 1    escitalopram (LEXAPRO) 20 mg tablet, Take 1 tablet (20 mg total) by mouth daily, Disp: 90 tablet, Rfl: 1    estrogen, conjugated,-medroxyprogesterone (PREMPRO) 0 3-1 5 MG per tablet, Take 1 tablet by mouth every 3 (three) days, Disp: 45 tablet, Rfl: 1    estrogen, conjugated,-medroxyprogesterone (PREMPRO) 0 45-1 5 MG per tablet, Take 1 tablet by mouth daily, Disp: 30 tablet, Rfl: 3    gabapentin (NEURONTIN) 300 mg capsule, Take 1 capsule (300 mg total) by mouth 3 (three) times a day, Disp: 270 capsule, Rfl: 1    lansoprazole (PREVACID) 15 mg capsule, Take 1 capsule (15 mg total) by mouth daily, Disp: 90 capsule, Rfl: 1    levETIRAcetam (KEPPRA) 750 mg tablet, Take 750 mg by mouth 2 (two) times a day 1 2 pill in am, 1 pill in pm, Disp: , Rfl:     metFORMIN (GLUCOPHAGE-XR) 500 mg 24 hr tablet, Take 1 tablet (500 mg total) by mouth daily with breakfast, Disp: 90 tablet, Rfl: 1    Multiple Vitamins-Minerals (MULTIVITAMIN WITH MINERALS) tablet, Take 1 tablet by mouth daily, Disp: , Rfl:     topiramate (TOPAMAX) 100 mg tablet, Take 150 mg by mouth 2 (two) times a day  , Disp: , Rfl:     topiramate (TOPAMAX) 200 MG tablet, Take 150 mg by mouth 2 (two) times a day, Disp: , Rfl:     traZODone (DESYREL) 100 mg tablet, Take 1 tablet (100 mg total) by mouth daily at bedtime, Disp: 90 tablet, Rfl: 1    venlafaxine (EFFEXOR-XR) 75 mg 24 hr capsule, Take 1 capsule (75 mg total) by mouth daily, Disp: 90 capsule, Rfl: 1      Review of Systems   Constitutional: Negative  HENT: Negative  Eyes: Negative  Respiratory: Negative  Cardiovascular: Negative  Gastrointestinal: Negative  Endocrine: Negative  Genitourinary:        As noted in HPI   Musculoskeletal: Negative  Skin: Negative  Allergic/Immunologic: Negative  Neurological: Negative  Hematological: Negative  Psychiatric/Behavioral: Negative  /60 (BP Location: Right arm, Patient Position: Sitting, Cuff Size: Adult)   Pulse 73   Temp (!) 97 2 °F (36 2 °C) (Tympanic)   Ht 5' 4" (1 626 m)   Wt 87 kg (191 lb 12 8 oz)   BMI 32 92 kg/m²       Physical Exam  Constitutional:       General: She is not in acute distress  Appearance: She is well-developed  Cardiovascular:      Rate and Rhythm: Normal rate  Pulses: Normal pulses  Neurological:      Mental Status: She is alert and oriented to person, place, and time  Skin:     General: Skin is warm and dry  Psychiatric:         Attention and Perception: Attention normal          Mood and Affect: Mood normal          Speech: Speech normal          Behavior: Behavior normal  Behavior is cooperative  The remainder of her physical examination was deferred as she was here today for consultation and discussion  I have spent 20 minutes on day of encounter, time spent involved pre-charting, review of previous records, face to face encounter, counseling and post visit documentation

## 2022-07-14 ENCOUNTER — OFFICE VISIT (OUTPATIENT)
Dept: OBGYN CLINIC | Facility: CLINIC | Age: 72
End: 2022-07-14
Payer: MEDICARE

## 2022-07-14 VITALS
HEART RATE: 73 BPM | HEIGHT: 64 IN | SYSTOLIC BLOOD PRESSURE: 132 MMHG | BODY MASS INDEX: 32.74 KG/M2 | TEMPERATURE: 97.2 F | WEIGHT: 191.8 LBS | DIASTOLIC BLOOD PRESSURE: 60 MMHG

## 2022-07-14 DIAGNOSIS — M35.00 SICCA SYNDROME (HCC): ICD-10-CM

## 2022-07-14 DIAGNOSIS — G40.909 SEIZURE DISORDER (HCC): ICD-10-CM

## 2022-07-14 DIAGNOSIS — Z78.0 MENOPAUSE: ICD-10-CM

## 2022-07-14 DIAGNOSIS — N95.1 HOT FLASHES DUE TO MENOPAUSE: Primary | ICD-10-CM

## 2022-07-14 PROCEDURE — 99213 OFFICE O/P EST LOW 20 MIN: CPT | Performed by: OBSTETRICS & GYNECOLOGY

## 2022-07-21 ENCOUNTER — OFFICE VISIT (OUTPATIENT)
Dept: FAMILY MEDICINE CLINIC | Facility: CLINIC | Age: 72
End: 2022-07-21
Payer: MEDICARE

## 2022-07-21 VITALS
DIASTOLIC BLOOD PRESSURE: 70 MMHG | WEIGHT: 191.2 LBS | BODY MASS INDEX: 32.64 KG/M2 | TEMPERATURE: 98.2 F | HEIGHT: 64 IN | HEART RATE: 60 BPM | OXYGEN SATURATION: 96 % | SYSTOLIC BLOOD PRESSURE: 120 MMHG

## 2022-07-21 DIAGNOSIS — K21.9 GASTROESOPHAGEAL REFLUX DISEASE WITHOUT ESOPHAGITIS: ICD-10-CM

## 2022-07-21 DIAGNOSIS — Z00.00 MEDICARE ANNUAL WELLNESS VISIT, SUBSEQUENT: Primary | ICD-10-CM

## 2022-07-21 DIAGNOSIS — N18.30 STAGE 3 CHRONIC KIDNEY DISEASE, UNSPECIFIED WHETHER STAGE 3A OR 3B CKD (HCC): ICD-10-CM

## 2022-07-21 DIAGNOSIS — G89.29 CHRONIC PAIN OF LEFT KNEE: ICD-10-CM

## 2022-07-21 DIAGNOSIS — M25.562 CHRONIC PAIN OF LEFT KNEE: ICD-10-CM

## 2022-07-21 DIAGNOSIS — I10 PRIMARY HYPERTENSION: ICD-10-CM

## 2022-07-21 DIAGNOSIS — L91.8 SKIN TAG: ICD-10-CM

## 2022-07-21 DIAGNOSIS — E78.49 OTHER HYPERLIPIDEMIA: ICD-10-CM

## 2022-07-21 DIAGNOSIS — E11.65 TYPE 2 DIABETES MELLITUS WITH HYPERGLYCEMIA, WITHOUT LONG-TERM CURRENT USE OF INSULIN (HCC): ICD-10-CM

## 2022-07-21 DIAGNOSIS — M25.462 EFFUSION OF BURSA OF LEFT KNEE: ICD-10-CM

## 2022-07-21 DIAGNOSIS — G43.909 MIGRAINE WITHOUT STATUS MIGRAINOSUS, NOT INTRACTABLE, UNSPECIFIED MIGRAINE TYPE: ICD-10-CM

## 2022-07-21 LAB — SL AMB POCT HEMOGLOBIN AIC: 7.1 (ref ?–6.5)

## 2022-07-21 PROCEDURE — 99213 OFFICE O/P EST LOW 20 MIN: CPT | Performed by: NURSE PRACTITIONER

## 2022-07-21 PROCEDURE — G0438 PPPS, INITIAL VISIT: HCPCS | Performed by: NURSE PRACTITIONER

## 2022-07-21 PROCEDURE — 83036 HEMOGLOBIN GLYCOSYLATED A1C: CPT | Performed by: NURSE PRACTITIONER

## 2022-07-21 RX ORDER — METHYLPREDNISOLONE 4 MG/1
TABLET ORAL
Qty: 21 EACH | Refills: 0 | Status: SHIPPED | OUTPATIENT
Start: 2022-07-21 | End: 2022-10-24

## 2022-07-21 NOTE — PROGRESS NOTES
Assessment and Plan:     Problem List Items Addressed This Visit        Digestive    Esophageal reflux       Endocrine    Type 2 diabetes mellitus with hyperglycemia, without long-term current use of insulin ([facility])    Relevant Orders    POCT hemoglobin A1c       Cardiovascular and Mediastinum    Migraine    Hypertension       Genitourinary    Stage 3 chronic kidney disease, unspecified whether stage 3a or 3b CKD ([facility] [address] )       Other    Hyperlipidemia      Other Visit Diagnoses     Medicare annual wellness visit, subsequent    -  Primary    Effusion of bursa of left knee        Relevant Medications    methylPREDNISolone 4 MG tablet therapy pack    Chronic pain of left knee        Relevant Medications    methylPREDNISolone 4 MG tablet therapy pack    Skin tag        Relevant Orders    Ambulatory Referral to Dermatology           Preventive health issues were discussed with patient, and age appropriate screening tests were ordered as noted in patient's After Visit Summary  Personalized health advice and appropriate referrals for health education or preventive services given if needed, as noted in patient's After Visit Summary  Discussed options of care for her left knee - reviewed xray vs ortho vs medicinal   She would like to try a course of steroid at this time first      Reviewed HA1C - slightly elevated at 7 1 - continue with current medication regimen  History of Present Illness:     Patient presents for a Medicare Wellness Visit    Reports onset of left knee pain, swelling - was told many years ago of arthritis in her knees  Notes that she has been very active lately in her yard  Noted swelling of left knee  Patient Care Team:  Cynthia Rivera as PCP - General (Family Medicine)     Review of Systems:     Review of Systems   Constitutional: Negative  HENT: Negative  Respiratory: Negative  Cardiovascular: Negative  Gastrointestinal: Negative      Musculoskeletal: See HPI   Neurological: Negative  All other systems reviewed and are negative         Problem List:     Patient Active Problem List   Diagnosis    Esophageal reflux    Type 2 diabetes mellitus with hyperglycemia, without long-term current use of insulin (HCC)    Migraine    Hypertension    Sciatica    Sjogren's syndrome ([facility] [address] )    Hyperlipidemia    Depression    Sicca syndrome (HCC)    Severe sepsis (HCC)    S/P craniotomy    Mild episode of recurrent major depressive disorder (HCC)    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HCC)    Seizure disorder ([facility] [address] )    Stage 3 chronic kidney disease, unspecified whether stage 3a or 3b [facility]      Past Medical and Surgical History:     Past Medical History:   Diagnosis Date    Cervicalgia     Depression     Diabetes mellitus ([facility] [address] )     Esophageal reflux     Hyperlipidemia     Hypertension     Essential    Migraine     Nonspecific abnormal electrocardiogram (ECG)     Sciatica     Seizure disorder (HCC)     [facility]'s [facility]      Past Surgical History:   Procedure Laterality Date    CERVICAL POLYPECTOMY      CRANIOTOMY       Left frontal craniotomy for resection of tumor-  [address]     TUBAL LIGATION        Family History:     Family History   Problem Relation Age of Onset    Heart disease Brother     Heart disease Father     Diabetes Maternal Aunt     Diabetes Maternal Grandmother     Aneurysm Mother     Lupus Sister     Thyroid cancer Sister         age unknown    No Known Problems Maternal Grandfather     No Known Problems Paternal Grandmother     No Known Problems Paternal Grandfather     No Known Problems Sister     No Known Problems Sister     No Known Problems Maternal Aunt     No Known Problems Maternal Aunt     No Known Problems Paternal Aunt       Social History:     Social History     Socioeconomic History    Marital status: /Civil Union     Spouse name: None    Number of children: None    Years of education: None    Highest education level: None   Occupational History    None   Tobacco Use    Smoking status: Never Smoker    Smokeless tobacco: Never Used   Vaping Use    Vaping Use: Never used   Substance and Sexual Activity    Alcohol use: Never    Drug use: Never    Sexual activity: Not Currently   Other Topics Concern    None   Social History Narrative    None     Social Determinants of Health     Financial Resource Strain: Not on file   Food Insecurity: Not on file   Transportation Needs: Not on file   Physical Activity: Not on file   Stress: Not on file   Social Connections: Not on file   Intimate Partner Violence: Not on file   Housing Stability: Not on file      Medications and Allergies:     Current Outpatient Medications   Medication Sig Dispense Refill    amLODIPine (NORVASC) 2 5 mg tablet Take 2 tablets (5 mg total) by mouth daily 180 tablet 1    ASPIRIN 81 PO Take 81 mg by mouth daily      atorvastatin (LIPITOR) 20 mg tablet Take 1 tablet (20 mg total) by mouth daily at bedtime 90 tablet 1    estrogen, conjugated,-medroxyprogesterone (PREMPRO) 0 3-1 5 MG per tablet Take 1 tablet by mouth every 3 (three) days 45 tablet 1    estrogen, conjugated,-medroxyprogesterone (PREMPRO) 0 45-1 5 MG per tablet Take 1 tablet by mouth daily (Patient taking differently: Take 1 tablet by mouth Take 1 tablet every four days) 30 tablet 3    gabapentin (NEURONTIN) 300 mg capsule Take 1 capsule (300 mg total) by mouth 3 (three) times a day 270 capsule 1    levETIRAcetam (KEPPRA) 750 mg tablet Take 750 mg by mouth 2 (two) times a day 1 2 pill in am, 1 pill in pm      metFORMIN (GLUCOPHAGE-XR) 500 mg 24 hr tablet Take 1 tablet (500 mg total) by mouth daily with breakfast 90 tablet 1    methylPREDNISolone 4 MG tablet therapy pack Use as directed on package 21 each 0    Multiple Vitamins-Minerals (MULTIVITAMIN WITH MINERALS) tablet Take 1 tablet by mouth daily      topiramate (TOPAMAX) 100 mg tablet Take 150 mg by mouth 2 (two) times a day        topiramate (TOPAMAX) 200 MG tablet Take 150 mg by mouth 2 (two) times a day      traZODone (DESYREL) 100 mg tablet Take 1 tablet (100 mg total) by mouth daily at bedtime 90 tablet 1    venlafaxine (EFFEXOR-XR) 75 mg 24 hr capsule Take 1 capsule (75 mg total) by mouth daily 90 capsule 1    lansoprazole (PREVACID) 15 mg capsule Take 1 capsule (15 mg total) by mouth daily 90 capsule 1     No current facility-administered medications for this visit       Allergies   Allergen Reactions    Lamotrigine      Other reaction(s): LAMOTRIGINE (LAMICTAL) rash      Immunizations:     Immunization History   Administered Date(s) Administered    COVID-19 MODERNA VACC 0 5 ML IM 03/19/2021, 04/19/2021, 11/01/2021    INFLUENZA 11/02/2005, 10/08/2008, 09/22/2009, 10/07/2010, 10/10/2012, 09/24/2013, 10/07/2016, 10/12/2016, 09/26/2017, 09/17/2018, 09/08/2020    Influenza Quadrivalent Preservative Free 3 years and older IM 10/12/2016    Influenza Split High Dose Preservative Free IM 09/26/2017, 09/17/2018    Influenza, high dose seasonal 0 7 mL 10/04/2019    Influenza, seasonal, injectable, preservative free 10/04/2015    Pneumococcal Conjugate 13-Valent 09/26/2017    Pneumococcal Polysaccharide PPV23 12/23/2020    Zoster Vaccine Recombinant 12/23/2020, 05/03/2021      Health Maintenance:         Topic Date Due    Breast Cancer Screening: Mammogram  07/23/2022    DXA SCAN  09/30/2022    Colorectal Cancer Screening  10/12/2023    Cervical Cancer Screening  12/16/2024    Hepatitis C Screening  Completed         Topic Date Due    Influenza Vaccine (1) 09/01/2022      Medicare Screening Tests and Risk Assessments:         Advance Care Planning:   Living will: No    Durable POA for healthcare: No    Advanced directive: Yes    Advanced directive counseling given: No    Five wishes given: No    Patient declined ACP directive: No    End of Life Decisions reviewed with patient: No Provider agrees with end of life decisions: Yes      Cognitive Screening:   Provider or family/friend/caregiver concerned regarding cognition?: No    PREVENTIVE SCREENINGS      Cardiovascular Screening:    General: Screening Not Indicated and History Lipid Disorder      Diabetes Screening:     General: Screening Not Indicated and History Diabetes      Colorectal Cancer Screening:     General: Screening Current      Breast Cancer Screening:     General: Screening Current      Cervical Cancer Screening:    General: Screening Not Indicated      Osteoporosis Screening:    General: Screening Current      Abdominal Aortic Aneurysm (AAA) Screening:        General: Screening Not Indicated      Lung Cancer Screening:     General: Screening Not Indicated      Hepatitis C Screening:    General: Screening Current    No exam data present     Physical Exam:     /70 (BP Location: Left arm, Patient Position: Sitting)   Pulse 60   Temp 98 2 °F (36 8 °C)   Ht 5' 4" (1 626 m)   Wt 86 7 kg (191 lb 3 2 oz)   SpO2 96%   BMI 32 82 kg/m²     Physical Exam  Constitutional:       Appearance: Normal appearance  Cardiovascular:      Rate and Rhythm: Normal rate and regular rhythm  Pulmonary:      Effort: Pulmonary effort is normal       Breath sounds: Normal breath sounds  Musculoskeletal:      Left knee: Swelling present  Decreased range of motion  Legs:    Neurological:      General: No focal deficit present  Mental Status: She is alert and oriented to person, place, and time  Mental status is at baseline  Psychiatric:         Mood and Affect: Mood normal          Behavior: Behavior normal          Thought Content:  Thought content normal          Judgment: Judgment normal           JENI Sanchez

## 2022-07-21 NOTE — PROGRESS NOTES
Shannon Hallman is here for her Subsequent Wellness visit  Last Medicare Wellness visit information reviewed, patient interviewed and updates made to the record today  Health Risk Assessment:   Patient rates overall health as very good  Patient feels that their physical health rating is same  Patient is satisfied with their life  Eyesight was rated as same  Hearing was rated as same  Patient feels that their emotional and mental health rating is same  Patients states they are sometimes angry  Patient states they are sometimes unusually tired/fatigued  Pain experienced in the last 7 days has been some  Patient's pain rating has been 3/10  Patient states that she has experienced no weight loss or gain in last 6 months  Fall Risk Screening: In the past year, patient has experienced: no history of falling in past year      Urinary Incontinence Screening:   Patient has not leaked urine accidently in the last six months  Home Safety:  Patient does not have trouble with stairs inside or outside of their home  Patient has working smoke alarms and has working carbon monoxide detector  Home safety hazards include: none  Nutrition:   Current diet is Regular  Medications:   Patient is not currently taking any over-the-counter supplements  Patient is able to manage medications  Activities of Daily Living (ADLs)/Instrumental Activities of Daily Living (IADLs):   Walk and transfer into and out of bed and chair?: Yes  Dress and groom yourself?: Yes    Bathe or shower yourself?: Yes    Feed yourself?  Yes  Do your laundry/housekeeping?: Yes  Manage your money, pay your bills and track your expenses?: Yes  Make your own meals?: Yes    Do your own shopping?: Yes    Previous Hospitalizations:   Any hospitalizations or ED visits within the last 12 months?: No      PREVENTIVE SCREENINGS      Cardiovascular Screening:    General: Screening Not Indicated and History Lipid Disorder      Diabetes Screening:     General: Screening Not Indicated and History Diabetes      Colorectal Cancer Screening:     General: Screening Current      Breast Cancer Screening:     General: Screening Current      Cervical Cancer Screening:    General: Screening Not Indicated      Osteoporosis Screening:    General: Screening Current      Lung Cancer Screening:     General: Screening Not Indicated      Hepatitis C Screening:    General: Screening Current    Screening, Brief Intervention, and Referral to Treatment (SBIRT)    Screening  Typical number of drinks in a day: 0  Typical number of drinks in a week: 0  Interpretation: Low risk drinking behavior      Single Item Drug Screening:  How often have you used an illegal drug (including marijuana) or a prescription medication for non-medical reasons in the past year? never    Single Item Drug Screen Score: 0  Interpretation: Negative screen for possible drug use disorder

## 2022-07-21 NOTE — PATIENT INSTRUCTIONS
Medicare Preventive Visit Patient Instructions  Thank you for completing your Welcome to Medicare Visit or Medicare Annual Wellness Visit today  Your next wellness visit will be due in one year (7/22/2023)  The screening/preventive services that you may require over the next 5-10 years are detailed below  Some tests may not apply to you based off risk factors and/or age  Screening tests ordered at today's visit but not completed yet may show as past due  Also, please note that scanned in results may not display below  Preventive Screenings:  Service Recommendations Previous Testing/Comments   Colorectal Cancer Screening  * Colonoscopy    * Fecal Occult Blood Test (FOBT)/Fecal Immunochemical Test (FIT)  * Fecal DNA/Cologuard Test  * Flexible Sigmoidoscopy Age: 54-65 years old   Colonoscopy: every 10 years (may be performed more frequently if at higher risk)  OR  FOBT/FIT: every 1 year  OR  Cologuard: every 3 years  OR  Sigmoidoscopy: every 5 years  Screening may be recommended earlier than age 48 if at higher risk for colorectal cancer  Also, an individualized decision between you and your healthcare provider will decide whether screening between the ages of 74-80 would be appropriate  Colonoscopy: 10/08/2019  FOBT/FIT: 10/08/2019  Cologuard: 10/12/2020  Sigmoidoscopy: Not on file    Screening Current     Breast Cancer Screening Age: 36 years old  Frequency: every 1-2 years  Not required if history of left and right mastectomy Mammogram: 07/23/2021    Screening Current   Cervical Cancer Screening Between the ages of 21-29, pap smear recommended once every 3 years  Between the ages of 33-67, can perform pap smear with HPV co-testing every 5 years     Recommendations may differ for women with a history of total hysterectomy, cervical cancer, or abnormal pap smears in past  Pap Smear: 12/16/2021    Screening Not Indicated   Hepatitis C Screening Once for adults born between 1945 and 1965  More frequently in patients at high risk for Hepatitis C Hep C Antibody: 01/08/2020    Screening Current   Diabetes Screening 1-2 times per year if you're at risk for diabetes or have pre-diabetes Fasting glucose: 130 mg/dL   A1C: 7 0    Screening Not Indicated  History Diabetes   Cholesterol Screening Once every 5 years if you don't have a lipid disorder  May order more often based on risk factors  Lipid panel: 07/27/2021    Screening Not Indicated  History Lipid Disorder     Other Preventive Screenings Covered by Medicare:  1  Abdominal Aortic Aneurysm (AAA) Screening: covered once if your at risk  You're considered to be at risk if you have a family history of AAA  2  Lung Cancer Screening: covers low dose CT scan once per year if you meet all of the following conditions: (1) Age 50-69; (2) No signs or symptoms of lung cancer; (3) Current smoker or have quit smoking within the last 15 years; (4) You have a tobacco smoking history of at least 30 pack years (packs per day multiplied by number of years you smoked); (5) You get a written order from a healthcare provider  3  Glaucoma Screening: covered annually if you're considered high risk: (1) You have diabetes OR (2) Family history of glaucoma OR (3)  aged 48 and older OR (3)  American aged 72 and older  3  Osteoporosis Screening: covered every 2 years if you meet one of the following conditions: (1) You're estrogen deficient and at risk for osteoporosis based off medical history and other findings; (2) Have a vertebral abnormality; (3) On glucocorticoid therapy for more than 3 months; (4) Have primary hyperparathyroidism; (5) On osteoporosis medications and need to assess response to drug therapy  · Last bone density test (DXA Scan): 09/30/2020   5  HIV Screening: covered annually if you're between the age of 15-65  Also covered annually if you are younger than 13 and older than 72 with risk factors for HIV infection   For pregnant patients, it is covered up to 3 times per pregnancy  Immunizations:  Immunization Recommendations   Influenza Vaccine Annual influenza vaccination during flu season is recommended for all persons aged >= 6 months who do not have contraindications   Pneumococcal Vaccine (Prevnar and Pneumovax)  * Prevnar = PCV13  * Pneumovax = PPSV23   Adults 25-60 years old: 1-3 doses may be recommended based on certain risk factors  Adults 72 years old: Prevnar (PCV13) vaccine recommended followed by Pneumovax (PPSV23) vaccine  If already received PPSV23 since turning 65, then PCV13 recommended at least one year after PPSV23 dose  Hepatitis B Vaccine 3 dose series if at intermediate or high risk (ex: diabetes, end stage renal disease, liver disease)   Tetanus (Td) Vaccine - COST NOT COVERED BY MEDICARE PART B Following completion of primary series, a booster dose should be given every 10 years to maintain immunity against tetanus  Td may also be given as tetanus wound prophylaxis  Tdap Vaccine - COST NOT COVERED BY MEDICARE PART B Recommended at least once for all adults  For pregnant patients, recommended with each pregnancy  Shingles Vaccine (Shingrix) - COST NOT COVERED BY MEDICARE PART B  2 shot series recommended in those aged 48 and above     Health Maintenance Due:      Topic Date Due    Breast Cancer Screening: Mammogram  07/23/2022    DXA SCAN  09/30/2022    Colorectal Cancer Screening  10/12/2023    Cervical Cancer Screening  12/16/2024    Hepatitis C Screening  Completed     Immunizations Due:      Topic Date Due    Influenza Vaccine (1) 09/01/2022     Advance Directives   What are advance directives? Advance directives are legal documents that state your wishes and plans for medical care  These plans are made ahead of time in case you lose your ability to make decisions for yourself  Advance directives can apply to any medical decision, such as the treatments you want, and if you want to donate organs     What are the types of advance directives? There are many types of advance directives, and each state has rules about how to use them  You may choose a combination of any of the following:  · Living will: This is a written record of the treatment you want  You can also choose which treatments you do not want, which to limit, and which to stop at a certain time  This includes surgery, medicine, IV fluid, and tube feedings  · Durable power of  for healthcare [facility]): This is a written record that states who you want to make healthcare choices for you when you are unable to make them for yourself  This person, called a proxy, is usually a family member or a friend  You may choose more than 1 proxy  · Do not resuscitate (DNR) order:  A DNR order is used in case your heart stops beating or you stop breathing  It is a request not to have certain forms of treatment, such as CPR  A DNR order may be included in other types of advance directives  · Medical directive: This covers the care that you want if you are in a coma, near death, or unable to make decisions for yourself  You can list the treatments you want for each condition  Treatment may include pain medicine, surgery, blood transfusions, dialysis, IV or tube feedings, and a ventilator (breathing machine)  · Values history: This document has questions about your views, beliefs, and how you feel and think about life  This information can help others choose the care that you would choose  Why are advance directives important? An advance directive helps you control your care  Although spoken wishes may be used, it is better to have your wishes written down  Spoken wishes can be misunderstood, or not followed  Treatments may be given even if you do not want them  An advance directive may make it easier for your family to make difficult choices about your care     Weight Management   Why it is important to manage your weight:  Being overweight increases your risk of health conditions such as heart disease, high blood pressure, type 2 diabetes, and certain types of cancer  It can also increase your risk for osteoarthritis, sleep apnea, and other respiratory problems  Aim for a slow, steady weight loss  Even a small amount of weight loss can lower your risk of health problems  How to lose weight safely:  A safe and healthy way to lose weight is to eat fewer calories and get regular exercise  You can lose up about 1 pound a week by decreasing the number of calories you eat by 500 calories each day  Healthy meal plan for weight management:  A healthy meal plan includes a variety of foods, contains fewer calories, and helps you stay healthy  A healthy meal plan includes the following:  · Eat whole-grain foods more often  A healthy meal plan should contain fiber  Fiber is the part of grains, fruits, and vegetables that is not broken down by your body  Whole-grain foods are healthy and provide extra fiber in your diet  Some examples of whole-grain foods are whole-wheat breads and pastas, oatmeal, brown rice, and bulgur  · Eat a variety of vegetables every day  Include dark, leafy greens such as spinach, kale, chong greens, and mustard greens  Eat yellow and orange vegetables such as carrots, sweet potatoes, and winter squash  · Eat a variety of fruits every day  Choose fresh or canned fruit (canned in its own juice or light syrup) instead of juice  Fruit juice has very little or no fiber  · Eat low-fat dairy foods  Drink fat-free (skim) milk or 1% milk  Eat fat-free yogurt and low-fat cottage cheese  Try low-fat cheeses such as mozzarella and other reduced-fat cheeses  · Choose meat and other protein foods that are low in fat  Choose beans or other legumes such as split peas or lentils  Choose fish, skinless poultry (chicken or turkey), or lean cuts of red meat (beef or pork)  Before you cook meat or poultry, cut off any visible fat  · Use less fat and oil    Try baking foods instead of frying them  Add less fat, such as margarine, sour cream, regular salad dressing and mayonnaise to foods  Eat fewer high-fat foods  Some examples of high-fat foods include french fries, doughnuts, ice cream, and cakes  · Eat fewer sweets  Limit foods and drinks that are high in sugar  This includes candy, cookies, regular soda, and sweetened drinks  Exercise:  Exercise at least 30 minutes per day on most days of the week  Some examples of exercise include walking, biking, dancing, and swimming  You can also fit in more physical activity by taking the stairs instead of the elevator or parking farther away from stores  Ask your healthcare provider about the best exercise plan for you  © Copyright [employer] 2018 Information is for End User's use only and may not be sold, redistributed or otherwise used for commercial purposes   All illustrations and images included in CareNotes® are the copyrighted property of A D [employer] M , [employer]  or [address] [place] [address]

## 2022-07-28 DIAGNOSIS — B02.29 POST HERPETIC NEURALGIA: ICD-10-CM

## 2022-07-28 DIAGNOSIS — E78.49 OTHER HYPERLIPIDEMIA: ICD-10-CM

## 2022-07-28 DIAGNOSIS — I10 ESSENTIAL HYPERTENSION: ICD-10-CM

## 2022-07-28 DIAGNOSIS — E11.65 TYPE 2 DIABETES MELLITUS WITH HYPERGLYCEMIA, WITHOUT LONG-TERM CURRENT USE OF INSULIN (HCC): ICD-10-CM

## 2022-07-28 RX ORDER — ATORVASTATIN CALCIUM 20 MG/1
TABLET, FILM COATED ORAL
Qty: 90 TABLET | Refills: 1 | Status: SHIPPED | OUTPATIENT
Start: 2022-07-28

## 2022-07-28 RX ORDER — METFORMIN HYDROCHLORIDE 500 MG/1
TABLET, EXTENDED RELEASE ORAL
Qty: 90 TABLET | Refills: 1 | Status: SHIPPED | OUTPATIENT
Start: 2022-07-28

## 2022-07-28 RX ORDER — GABAPENTIN 300 MG/1
CAPSULE ORAL
Qty: 270 CAPSULE | Refills: 1 | Status: SHIPPED | OUTPATIENT
Start: 2022-07-28

## 2022-07-28 RX ORDER — AMLODIPINE BESYLATE 2.5 MG/1
TABLET ORAL
Qty: 180 TABLET | Refills: 1 | Status: SHIPPED | OUTPATIENT
Start: 2022-07-28

## 2022-08-09 ENCOUNTER — HOSPITAL ENCOUNTER (OUTPATIENT)
Dept: RADIOLOGY | Facility: CLINIC | Age: 72
Discharge: HOME | End: 2022-08-09
Payer: MEDICARE

## 2022-08-09 VITALS — WEIGHT: 191.2 LBS | HEIGHT: 64 IN | BODY MASS INDEX: 32.64 KG/M2

## 2022-08-09 DIAGNOSIS — Z12.31 ENCOUNTER FOR SCREENING MAMMOGRAM FOR MALIGNANT NEOPLASM OF BREAST: ICD-10-CM

## 2022-08-09 PROCEDURE — 77063 BREAST TOMOSYNTHESIS BI: CPT

## 2022-08-09 PROCEDURE — 77067 SCR MAMMO BI INCL CAD: CPT

## 2022-08-22 ENCOUNTER — OFFICE VISIT (OUTPATIENT)
Dept: FAMILY MEDICINE CLINIC | Facility: CLINIC | Age: 72
End: 2022-08-22
Payer: MEDICARE

## 2022-08-22 ENCOUNTER — APPOINTMENT (OUTPATIENT)
Dept: RADIOLOGY | Facility: CLINIC | Age: 72
End: 2022-08-22
Payer: MEDICARE

## 2022-08-22 VITALS
HEIGHT: 64 IN | OXYGEN SATURATION: 98 % | BODY MASS INDEX: 32.88 KG/M2 | TEMPERATURE: 98 F | DIASTOLIC BLOOD PRESSURE: 60 MMHG | SYSTOLIC BLOOD PRESSURE: 120 MMHG | WEIGHT: 192.6 LBS | HEART RATE: 77 BPM

## 2022-08-22 DIAGNOSIS — R15.9 INCONTINENCE OF FECES, UNSPECIFIED FECAL INCONTINENCE TYPE: ICD-10-CM

## 2022-08-22 DIAGNOSIS — K59.09 OTHER CONSTIPATION: ICD-10-CM

## 2022-08-22 DIAGNOSIS — K59.09 OTHER CONSTIPATION: Primary | ICD-10-CM

## 2022-08-22 PROCEDURE — 99213 OFFICE O/P EST LOW 20 MIN: CPT | Performed by: NURSE PRACTITIONER

## 2022-08-22 PROCEDURE — 74018 RADEX ABDOMEN 1 VIEW: CPT

## 2022-08-22 RX ORDER — ESCITALOPRAM OXALATE 20 MG/1
TABLET ORAL
COMMUNITY
Start: 2022-07-28

## 2022-08-22 NOTE — PROGRESS NOTES
Assessment/Plan:       Diagnoses and all orders for this visit:    Other constipation  -     XR abdomen 1 view kub; Future    Incontinence of feces, unspecified fecal incontinence type  -     XR abdomen 1 view kub; Future    Other orders  -     escitalopram (LEXAPRO) 20 mg tablet      Differential:  Constipation and fecal incontinence due to pressure from fecal load vs loss of rectal tone  Subjective:      Patient ID: Chelita Ferrari is a 81 y o  female  Here today with constipation and rectal issues  Reports a history of constipation -  Notes that she can go a week with no BM  - she then takes a stool softener/laxative with results  Reports she then has issues in which she has fecal incontinence where she goes a quarter size of stool in her underwear which she does not know occurs  Constipation        The following portions of the patient's history were reviewed and updated as appropriate: allergies, current medications, past family history, past medical history, past social history, past surgical history and problem list     Review of Systems   Gastrointestinal: Positive for constipation  See HPI   All other systems reviewed and are negative  Objective:      /60 (BP Location: Left arm, Patient Position: Sitting, Cuff Size: Standard)   Pulse 77   Temp 98 °F (36 7 °C)   Ht 5' 4" (1 626 m)   Wt 87 4 kg (192 lb 9 6 oz)   SpO2 98%   BMI 33 06 kg/m²          Physical Exam  Abdominal:      General: Bowel sounds are normal  There is no distension  Palpations: Abdomen is soft  Tenderness: There is no abdominal tenderness  Neurological:      Mental Status: She is alert and oriented to person, place, and time

## 2022-08-23 ENCOUNTER — TELEPHONE (OUTPATIENT)
Dept: FAMILY MEDICINE CLINIC | Facility: CLINIC | Age: 72
End: 2022-08-23

## 2022-08-23 NOTE — TELEPHONE ENCOUNTER
Can we please pass on to Neida Ahn that I reviewed her xray images with Dr Shantel Metz - there is a large amount of stool visible throughout her colon  Despite her taking a laxative at times she is still not cleaning herself out  I would recommend to begin to clean out the stool:  Miralax daily, stool softener (without the laxative) daily, and a probiotic daily for at least 1 week or until results are evident  If she has questions, please let me know

## 2022-10-03 ENCOUNTER — HOSPITAL ENCOUNTER (OUTPATIENT)
Dept: RADIOLOGY | Facility: CLINIC | Age: 72
Discharge: HOME | End: 2022-10-03
Payer: MEDICARE

## 2022-10-03 DIAGNOSIS — M85.852 OSTEOPENIA OF NECK OF LEFT FEMUR: ICD-10-CM

## 2022-10-03 PROCEDURE — 77080 DXA BONE DENSITY AXIAL: CPT

## 2022-10-24 ENCOUNTER — OFFICE VISIT (OUTPATIENT)
Dept: FAMILY MEDICINE CLINIC | Facility: CLINIC | Age: 72
End: 2022-10-24
Payer: MEDICARE

## 2022-10-24 VITALS
WEIGHT: 193.8 LBS | SYSTOLIC BLOOD PRESSURE: 110 MMHG | BODY MASS INDEX: 33.09 KG/M2 | HEART RATE: 75 BPM | DIASTOLIC BLOOD PRESSURE: 70 MMHG | TEMPERATURE: 97.6 F | OXYGEN SATURATION: 97 % | HEIGHT: 64 IN

## 2022-10-24 DIAGNOSIS — Z23 NEEDS FLU SHOT: ICD-10-CM

## 2022-10-24 DIAGNOSIS — F33.0 MILD EPISODE OF RECURRENT MAJOR DEPRESSIVE DISORDER (HCC): ICD-10-CM

## 2022-10-24 DIAGNOSIS — I10 PRIMARY HYPERTENSION: ICD-10-CM

## 2022-10-24 DIAGNOSIS — G43.909 MIGRAINE WITHOUT STATUS MIGRAINOSUS, NOT INTRACTABLE, UNSPECIFIED MIGRAINE TYPE: ICD-10-CM

## 2022-10-24 DIAGNOSIS — R63.1 POLYDIPSIA: ICD-10-CM

## 2022-10-24 DIAGNOSIS — E11.65 TYPE 2 DIABETES MELLITUS WITH HYPERGLYCEMIA, WITHOUT LONG-TERM CURRENT USE OF INSULIN (HCC): Primary | ICD-10-CM

## 2022-10-24 LAB
CREAT UR-MCNC: 100 MG/DL
MICROALBUMIN UR-MCNC: 14.6 MG/L (ref 0–20)
MICROALBUMIN/CREAT 24H UR: 15 MG/G CREATININE (ref 0–30)
SL AMB POCT HEMOGLOBIN AIC: 8.1 (ref ?–6.5)

## 2022-10-24 PROCEDURE — 99214 OFFICE O/P EST MOD 30 MIN: CPT | Performed by: NURSE PRACTITIONER

## 2022-10-24 PROCEDURE — 82043 UR ALBUMIN QUANTITATIVE: CPT | Performed by: NURSE PRACTITIONER

## 2022-10-24 PROCEDURE — 82570 ASSAY OF URINE CREATININE: CPT | Performed by: NURSE PRACTITIONER

## 2022-10-24 PROCEDURE — 83036 HEMOGLOBIN GLYCOSYLATED A1C: CPT | Performed by: NURSE PRACTITIONER

## 2022-10-24 RX ORDER — CALCIUM CARBONATE 200(500)MG
1 TABLET,CHEWABLE ORAL DAILY
COMMUNITY

## 2022-10-24 NOTE — PROGRESS NOTES
Name: Mabel Trejo      : 1950      MRN: [id number]  Encounter Provider: JENI Miller  Encounter Date: 10/24/2022   Encounter department: [address] [place] [address] PRIMARY CARE    Assessment & Plan     1  Type 2 diabetes mellitus with hyperglycemia, without long-term current use of insulin (HCC)  -     POCT hemoglobin A1c  -     Microalbumin / creatinine urine ratio ([facility]); Future  -     Microalbumin / creatinine urine ratio ([facility])    2  Polydipsia    3  Mild episode of recurrent major depressive disorder ([address] )  Comments:  Stable  4  Primary hypertension  Comments:  Within normal parameters today  5  Migraine without status migrainosus, not intractable, unspecified migraine type  Comments:  Controlled  6  Needs flu shot  -     influenza vaccine, high-dose, PF 0 7 mL (FLUZONE HIGH-DOSE)    Will have her double her current dose of metformin as she just picked up her 90 day supply  With her refill in 45 days will have her back down to 750 mg XR dose  Suspect increased thirst was due to elevation in blood sugar  She will also work on changing her dietary habits - will have her decrease her consumption of sweets  Discussed walking for exercise - inside or outside  Exercises also provided to her with her AVS          Subjective      Here today for a follow-up  Hx of T2DM - recent increase in HA1C from 7 1 to 8 1  Reports that she has been eating more ice cream and sweets like cinnamon buns  Notes that ice cream "is her weakness "    Hx of HTN  - controlled with medication  Hx of migraines, also controlled  Asking about weight bearing exercise due to having osteoporosis  Review of Systems   Constitutional: Negative  HENT: Negative  Respiratory: Negative  Cardiovascular: Negative  Gastrointestinal: Negative  Endocrine: Positive for polydipsia (Reports recent increase in thirst)  Neurological: Negative      All other Occupational Therapy   Treatment    Name: Katt Mcneal  MRN: [id number]  Admitting Diagnosis:  Intertrochanteric fracture of right hip  2 Days Post-Op    Recommendations:     Discharge Recommendations: nursing facility, skilled  Discharge Equipment Recommendations:   (TBD per SNF.)  Barriers to discharge:   (Pt is not at PLOF and is at risk for falls, readmission and morbidity if d/c home at this time.)    Assessment:     Katt Mcneal is a 82 y.o. female with a medical diagnosis of Intertrochanteric fracture of right hip. Performance deficits affecting function are weakness,impaired endurance,impaired self care skills,impaired functional mobilty,gait instability,impaired balance,decreased upper extremity function,decreased lower extremity function,decreased safety awareness,decreased coordination,decreased ROM,orthopedic precautions,impaired cognition,impaired cardiopulmonary response to activity.     Pt very pleasant and willing to participate in tx session this date w/ min encouragement. Pt was able to stand and laterally ambulate along bed w/ min A and use of RW; pt requiring cueing for bearing weight as tolerated through RLE but demo'd progress with this date as compared to day of eval. Pt will continue to benefit from skilled acute OT services to maximize functional capacity for safe performance w/ ADLs and functional mobility.     Rehab Prognosis:  Good; patient would benefit from acute skilled OT services to address these deficits and reach maximum level of function.       Plan:     Patient to be seen 5 x/week,6 x/week to address the above listed problems via self-care/home management,therapeutic activities,therapeutic exercises  · Plan of Care Expires: 01/23/22  · Plan of Care Reviewed with: patient    Subjective     Pain/Comfort:  · Pain Rating 1:  (Pt did not rate.)  · Location - Side 1: Right  · Location 1: hip  · Pain Addressed 1: Pre-medicate for activity,Reposition,Cessation of  Activity    Objective:     Communicated with: nurse (Homa) prior to session.  Patient found HOB elevated with bed alarm,telemetry,oxygen,peripheral IV upon OT entry to room.    General Precautions: Standard, fall,airborne,droplet,contact   Orthopedic Precautions:RLE weight bearing as tolerated   Braces: N/A  Respiratory Status: Nasal cannula, flow 3 L/min     Occupational Performance:     Bed Mobility:    · Patient completed Scooting hips to EOB with contact guard assistance  · Patient completed Supine to Sit with minimum assistance for managing RLE  · Patient completed Sit to Supine with moderate assistance for managing RLE     Functional Mobility/Transfers:  · Patient completed Sit <> Stand Transfer x 2 trials with contact guard assistance and minimum assistance  with  rolling walker   · Functional Mobility: Pt was able to laterally ambulate along bed w/ CGA-min A and use of RW. Pt noted w/ initially being hesitant for bearing weight through RLE but became more comfortable w/ time.     Activities of Daily Living:  · Upper Body Dressing: minimum assistance for donning gown over back     [facility] 6 Click ADL: 17    Treatment & Education:  -Pt performed ADLs and functional mobility ad noted above.   -Pt educated on safe functional mobility and ADL performance.  -Pt performed BUE TE w/ use of yellow theraband for increasing UB strength and endurance for safe performance w/ ADLs and functional mobility :    -shoulder abd/add   -shoulder IR/ER   -elbow flex   -diagonal shoulder raises    -tricep ext   -Pt encourage to perform at least 3x/day for 10-15 reps each.   -Questions and concerns addressed within OT scope.     Patient left HOB elevated with all lines intact, call button in reach, bed alarm on and BLEs elevated Education:      GOALS:   Multidisciplinary Problems     Occupational Therapy Goals        Problem: Occupational Therapy Goal    Goal Priority Disciplines Outcome Interventions   Occupational Therapy Goal     systems reviewed and are negative  Current Outpatient Medications on File Prior to Visit   Medication Sig   • amLODIPine (NORVASC) 2 5 mg tablet TAKE 2 TABLETS EVERY DAY   • ASPIRIN 81 PO Take 81 mg by mouth daily   • atorvastatin (LIPITOR) 20 mg tablet TAKE 1 TABLET AT BEDTIME   • calcium carbonate (TUMS) 500 mg chewable tablet Chew 1 tablet daily   • escitalopram (LEXAPRO) 20 mg tablet    • estrogen, conjugated,-medroxyprogesterone (PREMPRO) 0 3-1 5 MG per tablet Take 1 tablet by mouth every 3 (three) days   • gabapentin (NEURONTIN) 300 mg capsule TAKE 1 CAPSULE THREE TIMES DAILY   • levETIRAcetam (KEPPRA) 750 mg tablet Take 750 mg by mouth 2 (two) times a day 1 2 pill in am, 1 pill in pm   • metFORMIN (GLUCOPHAGE-XR) 500 mg 24 hr tablet TAKE 1 TABLET EVERY DAY WITH BREAKFAST   • Multiple Vitamins-Minerals (MULTIVITAMIN WITH MINERALS) tablet Take 1 tablet by mouth daily   • topiramate (TOPAMAX) 100 mg tablet Take 150 mg by mouth 2 (two) times a day     • topiramate (TOPAMAX) 200 MG tablet Take 150 mg by mouth 2 (two) times a day   • traZODone (DESYREL) 100 mg tablet Take 1 tablet (100 mg total) by mouth daily at bedtime   • venlafaxine (EFFEXOR-XR) 75 mg 24 hr capsule Take 1 capsule (75 mg total) by mouth daily   • Diclofenac Sodium (VOLTAREN) 1 %    • lansoprazole (PREVACID) 15 mg capsule Take 1 capsule (15 mg total) by mouth daily   • [DISCONTINUED] methylPREDNISolone 4 MG tablet therapy pack Use as directed on package (Patient not taking: No sig reported)       Objective     /70 (BP Location: Left arm, Patient Position: Sitting, Cuff Size: Standard)   Pulse 75   Temp 97 6 °F (36 4 °C)   Ht 5' 4" (1 626 m)   Wt 87 9 kg (193 lb 12 8 oz)   SpO2 97%   BMI 33 27 kg/m²     Physical Exam  Constitutional:       Appearance: Normal appearance  Cardiovascular:      Rate and Rhythm: Normal rate and regular rhythm     Pulmonary:      Effort: Pulmonary effort is normal       Breath sounds: Normal breath  OT, PT/OT Ongoing, Progressing    Description: Goals to be met by: 1/23/22     Patient will increase functional independence with ADLs by performing:    LE Dressing with Stand-by Assistance.  Grooming while standing at sink with Stand-by Assistance.  Toileting from bedside commode with Stand-by Assistance for hygiene and clothing management.   Supine to sit with Contact Guard Assistance.  Step transfer with Stand-by Assistance  Toilet transfer to bedside commode with Stand-by Assistance.  Upper extremity exercise program x15 reps per handout, with independence.                     Time Tracking:     OT Date of Treatment: 01/10/22  OT Start Time: 1630  OT Stop Time: 1649  OT Total Time (min): 19 min    Billable Minutes:Therapeutic Exercise 19  Total Time 19    OT/JEREMIAH: OT          1/10/2022     sounds  Neurological:      Mental Status: She is alert  Psychiatric:         Mood and Affect: Mood normal          Behavior: Behavior normal          Thought Content:  Thought content normal          Judgment: Judgment normal        JENI Horner

## 2022-11-14 ENCOUNTER — TELEPHONE (OUTPATIENT)
Dept: FAMILY MEDICINE CLINIC | Facility: CLINIC | Age: 72
End: 2022-11-14

## 2022-11-14 DIAGNOSIS — E11.65 TYPE 2 DIABETES MELLITUS WITH HYPERGLYCEMIA, WITHOUT LONG-TERM CURRENT USE OF INSULIN (HCC): Primary | ICD-10-CM

## 2022-11-14 RX ORDER — METFORMIN HYDROCHLORIDE 750 MG/1
750 TABLET, EXTENDED RELEASE ORAL
Qty: 90 TABLET | Refills: 1 | Status: SHIPPED | OUTPATIENT
Start: 2022-11-14

## 2022-11-14 NOTE — TELEPHONE ENCOUNTER
called  Pt will need a refill on Metformin now that she is taking two tablets daily to equal Metformin 1000mg  He is asking if you were planning on prescribing something different or a different dose tablet? She needs a refill sent to [facility]

## 2022-12-22 ENCOUNTER — OFFICE VISIT (OUTPATIENT)
Dept: OBGYN CLINIC | Facility: CLINIC | Age: 72
End: 2022-12-22

## 2022-12-22 VITALS
HEART RATE: 86 BPM | SYSTOLIC BLOOD PRESSURE: 126 MMHG | WEIGHT: 183 LBS | HEIGHT: 64 IN | BODY MASS INDEX: 31.24 KG/M2 | DIASTOLIC BLOOD PRESSURE: 86 MMHG

## 2022-12-22 DIAGNOSIS — Z79.890 HORMONE REPLACEMENT THERAPY: ICD-10-CM

## 2022-12-22 DIAGNOSIS — N95.1 HOT FLASHES DUE TO MENOPAUSE: Primary | ICD-10-CM

## 2022-12-22 NOTE — PROGRESS NOTES
Assessment/Plan:    Problem List Items Addressed This Visit    None  Visit Diagnoses     Hot flashes due to menopause    -  Primary    Relevant Medications    estrogen, conjugated,-medroxyprogesterone (PREMPRO) 0 3-1 5 MG per tablet    Hormone replacement therapy          Patient was advised to continue to taper off hormones and with the goal of being off hormones by next year  She was advised to continue to reduce her dosage by 1 pill every 3 months  Goal is to discontinue Prempro by next year  She is doing well with tapering off and has no rebound hot flashes or symptoms  Patient instructions      PREMPRO 0 3 mg 1 tablet every 5 days - start in January    PREMPRO 0 3 mg 1 tablet every 6 days - start in April      PREMPRO 0 3 mg 1 tablet every week - start in July    Goal: off hormones by next year       Subjective   Patient ID: Zane Garcia is a 81 y o  female  Patient is here for a follow-up  Chief Complaint   Patient presents with   • Follow-up     Med check     She has been on PREMPRO 0 3 mg every 4 days for the last 2 months  Patient with long standing h/p HRT   For hot flashes  She was previously on Prempro 0 45 mg every 3 days  She is not having vaginal bleeding  She is not sexually active  She has had no hot flashes during tapering      Menstrual History:  OB History        1    Para   1    Term   1       0    AB   0    Living   1       SAB   0    IAB   0    Ectopic   0    Multiple   0    Live Births   1                Menarche age: 17  No LMP recorded   Patient is postmenopausal    Menopause: 50      Past Medical History:   Diagnosis Date   • Cervicalgia    • Depression    • Diabetes mellitus ([facility] [address] )    • Esophageal reflux    • Hyperlipidemia    • Hypertension     Essential   • Migraine    • Nonspecific abnormal electrocardiogram (ECG)    • Sciatica    • Seizure disorder (HCC)    • Sjogren's syndrome (HCC)        Past Surgical History:   Procedure Laterality Date   • CERVICAL POLYPECTOMY     • CRANIOTOMY       Left frontal craniotomy for resection of tumor-  [facility]    • TUBAL LIGATION         Social History     Tobacco Use   • Smoking status: Never   • Smokeless tobacco: Never   Vaping Use   • Vaping Use: Never used   Substance Use Topics   • Alcohol use: Never   • Drug use: Never       Allergies   Allergen Reactions   • Lamotrigine      Other reaction(s): LAMOTRIGINE (LAMICTAL) rash         Current Outpatient Medications:   •  amLODIPine (NORVASC) 2 5 mg tablet, TAKE 2 TABLETS EVERY DAY, Disp: 180 tablet, Rfl: 1  •  ASPIRIN 81 PO, Take 81 mg by mouth daily, Disp: , Rfl:   •  atorvastatin (LIPITOR) 20 mg tablet, TAKE 1 TABLET AT BEDTIME, Disp: 90 tablet, Rfl: 1  •  calcium carbonate (TUMS) 500 mg chewable tablet, Chew 1 tablet daily, Disp: , Rfl:   •  Diclofenac Sodium (VOLTAREN) 1 %, , Disp: , Rfl:   •  escitalopram (LEXAPRO) 20 mg tablet, , Disp: , Rfl:   •  estrogen, conjugated,-medroxyprogesterone (PREMPRO) 0 3-1 5 MG per tablet, Take 1 tablet by mouth every 4(four) days, Disp: 22 tablet, Rfl: 3  •  gabapentin (NEURONTIN) 300 mg capsule, TAKE 1 CAPSULE THREE TIMES DAILY, Disp: 270 capsule, Rfl: 1  •  levETIRAcetam (KEPPRA) 750 mg tablet, Take 750 mg by mouth 2 (two) times a day 1 2 pill in am, 1 pill in pm, Disp: , Rfl:   •  metFORMIN (GLUCOPHAGE-XR) 750 mg 24 hr tablet, Take 1 tablet (750 mg total) by mouth daily with breakfast, Disp: 90 tablet, Rfl: 1  •  Multiple Vitamins-Minerals (MULTIVITAMIN WITH MINERALS) tablet, Take 1 tablet by mouth daily, Disp: , Rfl:   •  topiramate (TOPAMAX) 100 mg tablet, Take 150 mg by mouth 2 (two) times a day  , Disp: , Rfl:   •  topiramate (TOPAMAX) 200 MG tablet, Take 150 mg by mouth 2 (two) times a day, Disp: , Rfl:   •  traZODone (DESYREL) 100 mg tablet, Take 1 tablet (100 mg total) by mouth daily at bedtime, Disp: 90 tablet, Rfl: 1  •  venlafaxine (EFFEXOR-XR) 75 mg 24 hr capsule, Take 1 capsule (75 mg total) by mouth daily, Disp: 90 capsule, Rfl: 1  •  lansoprazole (PREVACID) 15 mg capsule, Take 1 capsule (15 mg total) by mouth daily, Disp: 90 capsule, Rfl: 1      Review of Systems   Constitutional: Negative  HENT: Negative  Eyes: Negative  Respiratory: Negative  Cardiovascular: Negative  Gastrointestinal: Negative  Endocrine: Negative  Genitourinary:        As noted in HPI   Musculoskeletal: Negative  Skin: Negative  Allergic/Immunologic: Negative  Neurological: Negative  Hematological: Negative  Psychiatric/Behavioral: Negative  /86 (BP Location: Left arm, Patient Position: Sitting, Cuff Size: Adult)   Pulse 86   Ht 5' 4" (1 626 m)   Wt 83 kg (183 lb)   BMI 31 41 kg/m²       Physical Exam  Constitutional:       General: She is not in acute distress  Appearance: She is well-developed  Cardiovascular:      Rate and Rhythm: Normal rate  Pulses: Normal pulses  Neurological:      Mental Status: She is alert and oriented to person, place, and time  Skin:     General: Skin is warm and dry  Psychiatric:         Attention and Perception: Attention normal          Mood and Affect: Mood normal          Speech: Speech normal          Behavior: Behavior normal  Behavior is cooperative                   Future Appointments   Date Time Provider Ela Diamond   1/24/2023  8:00 AM JENI Davis [facility]   8/15/2023  9:30 AM [facility]

## 2023-01-17 ENCOUNTER — RA CDI HCC (OUTPATIENT)
Dept: OTHER | Facility: HOSPITAL | Age: 73
End: 2023-01-17

## 2023-01-17 NOTE — PROGRESS NOTES
[id number]  [facility]  coding opportunities          Chart Reviewed number of suggestions sent to Provider: 1     Patients Insurance     Medicare Insurance: [employer]

## 2023-01-24 ENCOUNTER — APPOINTMENT (OUTPATIENT)
Dept: LAB | Facility: CLINIC | Age: 73
End: 2023-01-24

## 2023-01-24 ENCOUNTER — OFFICE VISIT (OUTPATIENT)
Dept: FAMILY MEDICINE CLINIC | Facility: CLINIC | Age: 73
End: 2023-01-24

## 2023-01-24 VITALS
WEIGHT: 177.8 LBS | HEIGHT: 64 IN | SYSTOLIC BLOOD PRESSURE: 128 MMHG | HEART RATE: 63 BPM | BODY MASS INDEX: 30.35 KG/M2 | TEMPERATURE: 97.3 F | DIASTOLIC BLOOD PRESSURE: 74 MMHG | OXYGEN SATURATION: 97 %

## 2023-01-24 DIAGNOSIS — E78.49 OTHER HYPERLIPIDEMIA: ICD-10-CM

## 2023-01-24 DIAGNOSIS — E11.65 TYPE 2 DIABETES MELLITUS WITH HYPERGLYCEMIA, WITHOUT LONG-TERM CURRENT USE OF INSULIN (HCC): Primary | ICD-10-CM

## 2023-01-24 DIAGNOSIS — N18.30 STAGE 3 CHRONIC KIDNEY DISEASE, UNSPECIFIED WHETHER STAGE 3A OR 3B CKD (HCC): ICD-10-CM

## 2023-01-24 DIAGNOSIS — G43.909 MIGRAINE WITHOUT STATUS MIGRAINOSUS, NOT INTRACTABLE, UNSPECIFIED MIGRAINE TYPE: ICD-10-CM

## 2023-01-24 DIAGNOSIS — I10 PRIMARY HYPERTENSION: ICD-10-CM

## 2023-01-24 DIAGNOSIS — D43.2 GANGLIOGLIOMA OF BRAIN (HCC): ICD-10-CM

## 2023-01-24 DIAGNOSIS — K21.9 GASTROESOPHAGEAL REFLUX DISEASE WITHOUT ESOPHAGITIS: ICD-10-CM

## 2023-01-24 DIAGNOSIS — M35.00 SICCA SYNDROME (HCC): ICD-10-CM

## 2023-01-24 DIAGNOSIS — G40.909 SEIZURE DISORDER (HCC): ICD-10-CM

## 2023-01-24 DIAGNOSIS — F33.0 MILD EPISODE OF RECURRENT MAJOR DEPRESSIVE DISORDER (HCC): ICD-10-CM

## 2023-01-24 PROBLEM — M54.30 SCIATICA: Status: RESOLVED | Noted: 2019-03-25 | Resolved: 2023-01-24

## 2023-01-24 PROBLEM — A41.9 SEVERE SEPSIS (HCC): Status: RESOLVED | Noted: 2020-01-20 | Resolved: 2023-01-24

## 2023-01-24 PROBLEM — R65.20 SEVERE SEPSIS (HCC): Status: RESOLVED | Noted: 2020-01-20 | Resolved: 2023-01-24

## 2023-01-24 LAB
ALBUMIN SERPL BCP-MCNC: 3.6 G/DL (ref 3.5–5)
ALP SERPL-CCNC: 63 U/L (ref 46–116)
ALT SERPL W P-5'-P-CCNC: 18 U/L (ref 12–78)
ANION GAP SERPL CALCULATED.3IONS-SCNC: 3 MMOL/L (ref 4–13)
AST SERPL W P-5'-P-CCNC: 14 U/L (ref 5–45)
BILIRUB SERPL-MCNC: 0.32 MG/DL (ref 0.2–1)
BUN SERPL-MCNC: 16 MG/DL (ref 5–25)
CALCIUM SERPL-MCNC: 9.6 MG/DL (ref 8.3–10.1)
CHLORIDE SERPL-SCNC: 109 MMOL/L (ref 96–108)
CHOLEST SERPL-MCNC: 107 MG/DL
CO2 SERPL-SCNC: 28 MMOL/L (ref 21–32)
CREAT SERPL-MCNC: 1.05 MG/DL (ref 0.6–1.3)
GFR SERPL CREATININE-BSD FRML MDRD: 53 ML/MIN/1.73SQ M
GLUCOSE P FAST SERPL-MCNC: 107 MG/DL (ref 65–99)
HDLC SERPL-MCNC: 40 MG/DL
LDLC SERPL CALC-MCNC: 38 MG/DL (ref 0–100)
NONHDLC SERPL-MCNC: 67 MG/DL
POTASSIUM SERPL-SCNC: 3.9 MMOL/L (ref 3.5–5.3)
PROT SERPL-MCNC: 7.5 G/DL (ref 6.4–8.4)
SL AMB POCT HEMOGLOBIN AIC: 6.3 (ref ?–6.5)
SODIUM SERPL-SCNC: 140 MMOL/L (ref 135–147)
TRIGL SERPL-MCNC: 146 MG/DL

## 2023-01-24 NOTE — PROGRESS NOTES
Name: Eddie Ambriz      : 1950      MRN: [id number]  Encounter Provider: JENI Barker  Encounter Date: 2023   Encounter department: [address] [place] [address] PRIMARY CARE    Assessment & Plan     1  Type 2 diabetes mellitus with hyperglycemia, without long-term current use of insulin (HCC)  -     POCT hemoglobin A1c    2  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[facility] )  Comments:  Stable, no recurrence  3  Stage 3 chronic kidney disease, unspecified whether stage 3a or 3b CKD ([facility])  Comments:  CMP ordered today, will assess current GFR level, stable in the past     Orders:  -     Comprehensive metabolic panel; Future    4  Mild episode of recurrent major depressive disorder ([facility] )  Comments:  Stable, remains active  5  Other hyperlipidemia  Comments:  Due for lipid panel  Orders:  -     Lipid panel; Future    6  Gastroesophageal reflux disease without esophagitis  Comments:  Controlled, continue medication regimen    7  Sicca syndrome (HCC)  Comments:  stable     8  Primary hypertension  Comments:  within parameters    9  Migraine without status migrainosus, not intractable, unspecified migraine type  Comments:  Controlled  10  Seizure disorder ([facility] )  Comments:  Stable, no recent events  Repeat HA1C today with much improvement to 6 3  BMI Counseling: Body mass index is 30 52 kg/m²  The BMI is above normal  Nutrition recommendations include encouraging healthy choices of fruits and vegetables  Exercise recommendations include moderate physical activity 150 minutes/week  Rationale for BMI follow-up plan is due to patient being overweight or obese  Recent dietary changes, has stopped with ice cream and larger consumption of sweet desserts  Discussed cutting back her metformin after next HA1C in 3 months if it remains at 6 3 or lower  Subjective      Here today for a follow-up  Hx of T2DM - last HA1C was 8 1 - currently on metformin    Reported at that time increased consumption of sweet foods  Notes she has scaled back and has been eating healthier  Hx of migraines/seizure/ganglioma with no recurrence  Denies any issues/concerns with her feet  Does not go barefoot  Review of Systems   Constitutional: Negative  HENT: Negative  Respiratory: Negative  Cardiovascular: Negative  Gastrointestinal: Negative  Neurological: Negative  All other systems reviewed and are negative        Current Outpatient Medications on File Prior to Visit   Medication Sig   • amLODIPine (NORVASC) 2 5 mg tablet TAKE 2 TABLETS EVERY DAY   • ASPIRIN 81 PO Take 81 mg by mouth daily   • atorvastatin (LIPITOR) 20 mg tablet TAKE 1 TABLET AT BEDTIME   • calcium carbonate (TUMS) 500 mg chewable tablet Chew 1 tablet daily   • escitalopram (LEXAPRO) 20 mg tablet    • estrogen, conjugated,-medroxyprogesterone (PREMPRO) 0 3-1 5 MG per tablet Take 1 tablet by mouth every 4(four) days (Patient taking differently: Take 1 tablet by mouth every 4(four) days Taking one every five days)   • gabapentin (NEURONTIN) 300 mg capsule TAKE 1 CAPSULE THREE TIMES DAILY   • lansoprazole (PREVACID) 15 mg capsule Take 1 capsule (15 mg total) by mouth daily   • levETIRAcetam (KEPPRA) 750 mg tablet Take 750 mg by mouth 2 (two) times a day 1 2 pill in am, 1 pill in pm   • metFORMIN (GLUCOPHAGE-XR) 750 mg 24 hr tablet Take 1 tablet (750 mg total) by mouth daily with breakfast   • Multiple Vitamins-Minerals (MULTIVITAMIN WITH MINERALS) tablet Take 1 tablet by mouth daily   • topiramate (TOPAMAX) 100 mg tablet Take 150 mg by mouth 2 (two) times a day     • topiramate (TOPAMAX) 200 MG tablet Take 150 mg by mouth 2 (two) times a day   • traZODone (DESYREL) 100 mg tablet Take 1 tablet (100 mg total) by mouth daily at bedtime   • venlafaxine (EFFEXOR-XR) 75 mg 24 hr capsule Take 1 capsule (75 mg total) by mouth daily   • [DISCONTINUED] Diclofenac Sodium (VOLTAREN) 1 %  (Patient not taking: Reported on 1/24/2023)       Objective /74 (BP Location: Left arm, Patient Position: Sitting, Cuff Size: Standard)   Pulse 63   Temp (!) 97 3 °F (36 3 °C)   Ht 5' 4" (1 626 m)   Wt 80 6 kg (177 lb 12 8 oz)   SpO2 97%   BMI 30 52 kg/m²     Physical Exam  Constitutional:       Appearance: Normal appearance  Cardiovascular:      Rate and Rhythm: Normal rate and regular rhythm  Pulmonary:      Effort: Pulmonary effort is normal       Breath sounds: Normal breath sounds  Neurological:      Mental Status: She is alert  Psychiatric:         Mood and Affect: Mood normal          Behavior: Behavior normal          Thought Content:  Thought content normal          Judgment: Judgment normal        JENI Xie

## 2023-03-20 DIAGNOSIS — I10 ESSENTIAL HYPERTENSION: ICD-10-CM

## 2023-03-20 DIAGNOSIS — E11.65 TYPE 2 DIABETES MELLITUS WITH HYPERGLYCEMIA, WITHOUT LONG-TERM CURRENT USE OF INSULIN (HCC): ICD-10-CM

## 2023-03-20 DIAGNOSIS — E78.49 OTHER HYPERLIPIDEMIA: ICD-10-CM

## 2023-03-20 RX ORDER — METFORMIN HYDROCHLORIDE 750 MG/1
750 TABLET, EXTENDED RELEASE ORAL
Qty: 90 TABLET | Refills: 1 | Status: SHIPPED | OUTPATIENT
Start: 2023-03-20

## 2023-03-20 RX ORDER — ATORVASTATIN CALCIUM 20 MG/1
20 TABLET, FILM COATED ORAL
Qty: 90 TABLET | Refills: 1 | Status: SHIPPED | OUTPATIENT
Start: 2023-03-20

## 2023-03-20 RX ORDER — AMLODIPINE BESYLATE 2.5 MG/1
5 TABLET ORAL DAILY
Qty: 180 TABLET | Refills: 1 | Status: SHIPPED | OUTPATIENT
Start: 2023-03-20

## 2023-04-23 NOTE — PROGRESS NOTES
Name: Art Gramajo      : 1950      MRN: [id number]  Encounter Provider: JENI Anthony  Encounter Date: 2023   Encounter department: [address] [place] [address] PRIMARY CARE    Assessment & Plan     1  Type 2 diabetes mellitus with hyperglycemia, without long-term current use of insulin [facility])  Assessment & Plan:    Lab Results   Component Value Date    HGBA1C 6 2 2023   Improving from previous  Orders:  -     POCT hemoglobin A1c    2  Post herpetic neuralgia  Assessment & Plan:  Remains above right eye - takes Gabapentin 300 mg TID - renal function remains stable at Level 3 a  May take an additional 300 mg at bedtime - reports that is when it is the worse  3  Gastroesophageal reflux disease without esophagitis  Assessment & Plan:  Controlled with famotidine  4  Migraine without status migrainosus, not intractable, unspecified migraine type  Assessment & Plan:  No overt migraine issues at this time  5  Primary hypertension  Assessment & Plan:  BP in acceptable range today  6  Sicca syndrome ([facility] )  Assessment & Plan:  Stable  7  Mild episode of recurrent major depressive disorder (HCC)  Assessment & Plan:  Stable        8  Other hyperlipidemia  Assessment & Plan:  Remains on statin - no issues  9  Sjogren's syndrome, with unspecified organ involvement ([facility] [address] )  Assessment & Plan:  Stable, no concerns  10  Encounter for diabetic foot exam ([facility] )  Current HA1C: 6 2    Last HA1C: 6 3     Last GFR:  gfr 53 (2023)    Urine Microalbumin: Done    Last Foot Exam: Done today    Neuropathy symptoms: None but takes gabapentin for post herpetic neuralgia    Last Mammogram: 2022     Next Mammogram Scheduled: 2023    Has GYN: NO    Last PAP: No     Past DEXA: 10/03/2022    Colorectal Cancer Screening: 10/2020 - Cologuard due soon         Subjective      Here Today with her     Hx of T2DM - HA1C continues to improve with Metformin - has been eating healthy, avoiding excessive sweets  Hx of post herpatic neuralgia on right upper forehead - controlled with gabapentin - takes TID  Also places lidocaine cream on area at night before bed  Review of Systems   Constitutional: Negative  HENT: Negative  Respiratory: Negative  Cardiovascular: Negative  Gastrointestinal: Negative  Neurological: Negative  See HPI   All other systems reviewed and are negative        Current Outpatient Medications on File Prior to Visit   Medication Sig   • amLODIPine (NORVASC) 2 5 mg tablet Take 2 tablets (5 mg total) by mouth daily   • ASPIRIN 81 PO Take 81 mg by mouth daily   • atorvastatin (LIPITOR) 20 mg tablet Take 1 tablet (20 mg total) by mouth daily at bedtime   • calcium carbonate (TUMS) 500 mg chewable tablet Chew 1 tablet daily   • escitalopram (LEXAPRO) 20 mg tablet    • estrogen, conjugated,-medroxyprogesterone (PREMPRO) 0 3-1 5 MG per tablet Take 1 tablet by mouth every 4(four) days (Patient taking differently: Take 1 tablet by mouth every 4(four) days Taking one every five days)   • gabapentin (NEURONTIN) 300 mg capsule TAKE 1 CAPSULE THREE TIMES DAILY   • lansoprazole (PREVACID) 15 mg capsule Take 1 capsule (15 mg total) by mouth daily   • levETIRAcetam (KEPPRA) 750 mg tablet Take 750 mg by mouth 2 (two) times a day 1 2 pill in am, 1 pill in pm   • metFORMIN (GLUCOPHAGE-XR) 750 mg 24 hr tablet Take 1 tablet (750 mg total) by mouth daily with breakfast   • Multiple Vitamins-Minerals (MULTIVITAMIN WITH MINERALS) tablet Take 1 tablet by mouth daily   • topiramate (TOPAMAX) 100 mg tablet Take 150 mg by mouth 2 (two) times a day     • topiramate (TOPAMAX) 200 MG tablet Take 150 mg by mouth 2 (two) times a day   • traZODone (DESYREL) 100 mg tablet Take 1 tablet (100 mg total) by mouth daily at bedtime   • venlafaxine (EFFEXOR-XR) 75 mg 24 hr capsule Take 1 capsule (75 mg total) by mouth daily       Objective     /64 (BP "Location: Left arm, Patient Position: Sitting, Cuff Size: Standard)   Pulse 62   Temp 97 6 °F (36 4 °C)   Ht 5' 4\" (1 626 m)   Wt 79 5 kg (175 lb 3 2 oz)   SpO2 99%   BMI 30 07 kg/m²     Physical Exam  Constitutional:       Appearance: Normal appearance  Cardiovascular:      Rate and Rhythm: Normal rate and regular rhythm  Pulses: no weak pulses          Dorsalis pedis pulses are 2+ on the right side and 2+ on the left side  Pulmonary:      Effort: Pulmonary effort is normal       Breath sounds: Normal breath sounds  Feet:      Right foot:      Skin integrity: No ulcer, skin breakdown, erythema, warmth, callus or dry skin  Left foot:      Skin integrity: No ulcer, skin breakdown, erythema, warmth, callus or dry skin  Neurological:      Mental Status: She is alert  Psychiatric:         Mood and Affect: Mood normal          Behavior: Behavior normal          Thought Content: Thought content normal          Judgment: Judgment normal          Diabetic Foot Exam    Patient's shoes and socks removed  Right Foot/Ankle   Right Foot Inspection  Skin Exam: skin normal and skin intact  No dry skin, no warmth, no callus, no erythema, no maceration, no abnormal color, no pre-ulcer, no ulcer and no callus  Sensory   Monofilament testing: intact    Vascular  The right DP pulse is 2+  Left Foot/Ankle  Left Foot Inspection  Skin Exam: skin normal and skin intact  No dry skin, no warmth, no erythema, no maceration, normal color, no pre-ulcer, no ulcer and no callus  Sensory   Monofilament testing: intact    Vascular  The left DP pulse is 2+       Assign Risk Category  No deformity present  No loss of protective sensation  No weak pulses  Risk: 0    JENI Cruz  "

## 2023-04-24 ENCOUNTER — OFFICE VISIT (OUTPATIENT)
Dept: FAMILY MEDICINE CLINIC | Facility: CLINIC | Age: 73
End: 2023-04-24

## 2023-04-24 VITALS
BODY MASS INDEX: 29.91 KG/M2 | HEIGHT: 64 IN | HEART RATE: 62 BPM | WEIGHT: 175.2 LBS | DIASTOLIC BLOOD PRESSURE: 64 MMHG | OXYGEN SATURATION: 99 % | TEMPERATURE: 97.6 F | SYSTOLIC BLOOD PRESSURE: 133 MMHG

## 2023-04-24 DIAGNOSIS — E78.49 OTHER HYPERLIPIDEMIA: ICD-10-CM

## 2023-04-24 DIAGNOSIS — F33.0 MILD EPISODE OF RECURRENT MAJOR DEPRESSIVE DISORDER (HCC): ICD-10-CM

## 2023-04-24 DIAGNOSIS — M35.00 SJOGREN'S SYNDROME, WITH UNSPECIFIED ORGAN INVOLVEMENT (HCC): ICD-10-CM

## 2023-04-24 DIAGNOSIS — E11.65 TYPE 2 DIABETES MELLITUS WITH HYPERGLYCEMIA, WITHOUT LONG-TERM CURRENT USE OF INSULIN (HCC): Primary | ICD-10-CM

## 2023-04-24 DIAGNOSIS — E11.9 ENCOUNTER FOR DIABETIC FOOT EXAM (HCC): ICD-10-CM

## 2023-04-24 DIAGNOSIS — I10 PRIMARY HYPERTENSION: ICD-10-CM

## 2023-04-24 DIAGNOSIS — M35.00 SICCA SYNDROME (HCC): ICD-10-CM

## 2023-04-24 DIAGNOSIS — K21.9 GASTROESOPHAGEAL REFLUX DISEASE WITHOUT ESOPHAGITIS: ICD-10-CM

## 2023-04-24 DIAGNOSIS — B02.29 POST HERPETIC NEURALGIA: ICD-10-CM

## 2023-04-24 DIAGNOSIS — G43.909 MIGRAINE WITHOUT STATUS MIGRAINOSUS, NOT INTRACTABLE, UNSPECIFIED MIGRAINE TYPE: ICD-10-CM

## 2023-04-24 PROBLEM — Z98.890 S/P CRANIOTOMY: Status: RESOLVED | Noted: 2020-01-20 | Resolved: 2023-04-24

## 2023-04-24 PROBLEM — F32.A DEPRESSION: Status: RESOLVED | Noted: 2019-03-26 | Resolved: 2023-04-24

## 2023-04-24 LAB — SL AMB POCT HEMOGLOBIN AIC: 6.2 (ref ?–6.5)

## 2023-04-24 NOTE — ASSESSMENT & PLAN NOTE
Remains above right eye - takes Gabapentin 300 mg TID - renal function remains stable at Level 3 a  May take an additional 300 mg at bedtime - reports that is when it is the worse

## 2023-05-08 DIAGNOSIS — N95.1 HOT FLASHES DUE TO MENOPAUSE: ICD-10-CM

## 2023-05-11 ENCOUNTER — TELEPHONE (OUTPATIENT)
Dept: OBGYN CLINIC | Facility: CLINIC | Age: 73
End: 2023-05-11

## 2023-05-11 NOTE — TELEPHONE ENCOUNTER
Pt's , Carl Coates, states the Prempro is not covered by their insurance  Is there a similar medication that you can send in as an alternative? Prescription would go to [facility]

## 2023-05-12 NOTE — TELEPHONE ENCOUNTER
Pts , Ilya Moraes, is aware and will plan to discontinue the medication after she no longer has any left

## 2023-06-30 DIAGNOSIS — B02.29 POST HERPETIC NEURALGIA: ICD-10-CM

## 2023-06-30 RX ORDER — GABAPENTIN 300 MG/1
300 CAPSULE ORAL 3 TIMES DAILY
Qty: 270 CAPSULE | Refills: 1 | Status: SHIPPED | OUTPATIENT
Start: 2023-06-30

## 2023-07-17 ENCOUNTER — RA CDI HCC (OUTPATIENT)
Dept: OTHER | Facility: HOSPITAL | Age: 73
End: 2023-07-17

## 2023-07-17 NOTE — PROGRESS NOTES
[address] [facility] coding opportunities          Chart Reviewed number of suggestions sent to Provider: 1     Patients Insurance     Medicare Insurance: [employer]

## 2023-07-24 ENCOUNTER — OFFICE VISIT (OUTPATIENT)
Dept: FAMILY MEDICINE CLINIC | Facility: CLINIC | Age: 73
End: 2023-07-24
Payer: MEDICARE

## 2023-07-24 VITALS
TEMPERATURE: 97.5 F | HEART RATE: 65 BPM | SYSTOLIC BLOOD PRESSURE: 140 MMHG | OXYGEN SATURATION: 99 % | HEIGHT: 64 IN | BODY MASS INDEX: 30.73 KG/M2 | WEIGHT: 180 LBS | DIASTOLIC BLOOD PRESSURE: 65 MMHG

## 2023-07-24 DIAGNOSIS — F33.0 MILD EPISODE OF RECURRENT MAJOR DEPRESSIVE DISORDER (HCC): ICD-10-CM

## 2023-07-24 DIAGNOSIS — I10 ESSENTIAL HYPERTENSION: ICD-10-CM

## 2023-07-24 DIAGNOSIS — D43.2 GANGLIOGLIOMA OF BRAIN (HCC): ICD-10-CM

## 2023-07-24 DIAGNOSIS — E78.49 OTHER HYPERLIPIDEMIA: ICD-10-CM

## 2023-07-24 DIAGNOSIS — I10 PRIMARY HYPERTENSION: ICD-10-CM

## 2023-07-24 DIAGNOSIS — Z00.00 MEDICARE ANNUAL WELLNESS VISIT, SUBSEQUENT: Primary | ICD-10-CM

## 2023-07-24 DIAGNOSIS — G43.909 MIGRAINE WITHOUT STATUS MIGRAINOSUS, NOT INTRACTABLE, UNSPECIFIED MIGRAINE TYPE: ICD-10-CM

## 2023-07-24 DIAGNOSIS — N18.30 STAGE 3 CHRONIC KIDNEY DISEASE, UNSPECIFIED WHETHER STAGE 3A OR 3B CKD (HCC): ICD-10-CM

## 2023-07-24 DIAGNOSIS — M25.572 ACUTE LEFT ANKLE PAIN: ICD-10-CM

## 2023-07-24 DIAGNOSIS — G40.909 SEIZURE DISORDER (HCC): ICD-10-CM

## 2023-07-24 DIAGNOSIS — M35.00 SJOGREN'S SYNDROME, WITH UNSPECIFIED ORGAN INVOLVEMENT (HCC): ICD-10-CM

## 2023-07-24 DIAGNOSIS — Z12.31 ENCOUNTER FOR SCREENING MAMMOGRAM FOR BREAST CANCER: ICD-10-CM

## 2023-07-24 DIAGNOSIS — E11.65 TYPE 2 DIABETES MELLITUS WITH HYPERGLYCEMIA, WITHOUT LONG-TERM CURRENT USE OF INSULIN (HCC): ICD-10-CM

## 2023-07-24 LAB — SL AMB POCT HEMOGLOBIN AIC: 6.6 (ref ?–6.5)

## 2023-07-24 PROCEDURE — 99213 OFFICE O/P EST LOW 20 MIN: CPT | Performed by: NURSE PRACTITIONER

## 2023-07-24 PROCEDURE — 83036 HEMOGLOBIN GLYCOSYLATED A1C: CPT | Performed by: NURSE PRACTITIONER

## 2023-07-24 PROCEDURE — G0439 PPPS, SUBSEQ VISIT: HCPCS | Performed by: NURSE PRACTITIONER

## 2023-07-24 RX ORDER — ATORVASTATIN CALCIUM 20 MG/1
20 TABLET, FILM COATED ORAL
Qty: 90 TABLET | Refills: 1 | Status: SHIPPED | OUTPATIENT
Start: 2023-07-24

## 2023-07-24 RX ORDER — METFORMIN HYDROCHLORIDE 750 MG/1
750 TABLET, EXTENDED RELEASE ORAL
Qty: 90 TABLET | Refills: 1 | Status: SHIPPED | OUTPATIENT
Start: 2023-07-24

## 2023-07-24 RX ORDER — AMLODIPINE BESYLATE 2.5 MG/1
5 TABLET ORAL DAILY
Qty: 180 TABLET | Refills: 1 | Status: SHIPPED | OUTPATIENT
Start: 2023-07-24

## 2023-07-24 NOTE — PATIENT INSTRUCTIONS
Medicare Preventive Visit Patient Instructions  Thank you for completing your Welcome to Medicare Visit or Medicare Annual Wellness Visit today. Your next wellness visit will be due in one year (7/24/2024). The screening/preventive services that you may require over the next 5-10 years are detailed below. Some tests may not apply to you based off risk factors and/or age. Screening tests ordered at today's visit but not completed yet may show as past due. Also, please note that scanned in results may not display below. Preventive Screenings:  Service Recommendations Previous Testing/Comments   Colorectal Cancer Screening  * Colonoscopy    * Fecal Occult Blood Test (FOBT)/Fecal Immunochemical Test (FIT)  * Fecal DNA/Cologuard Test  * Flexible Sigmoidoscopy Age: 43-73 years old   Colonoscopy: every 10 years (may be performed more frequently if at higher risk)  OR  FOBT/FIT: every 1 year  OR  Cologuard: every 3 years  OR  Sigmoidoscopy: every 5 years  Screening may be recommended earlier than age 39 if at higher risk for colorectal cancer. Also, an individualized decision between you and your healthcare provider will decide whether screening between the ages of 77-80 would be appropriate. Colonoscopy: 10/12/2020  FOBT/FIT: Not on file  Cologuard: 10/12/2020  Sigmoidoscopy: Not on file          Breast Cancer Screening Age: 36 years old  Frequency: every 1-2 years  Not required if history of left and right mastectomy Mammogram: 08/09/2022        Cervical Cancer Screening Between the ages of 21-29, pap smear recommended once every 3 years. Between the ages of 32-69, can perform pap smear with HPV co-testing every 5 years.    Recommendations may differ for women with a history of total hysterectomy, cervical cancer, or abnormal pap smears in past. Pap Smear: 12/16/2021        Hepatitis C Screening Once for adults born between 1945 and 1965  More frequently in patients at high risk for Hepatitis C Hep C Antibody: 01/08/2020        Diabetes Screening 1-2 times per year if you're at risk for diabetes or have pre-diabetes Fasting glucose: 107 mg/dL (1/24/2023)  A1C: 6.2 (4/24/2023)      Cholesterol Screening Once every 5 years if you don't have a lipid disorder. May order more often based on risk factors. Lipid panel: 01/24/2023          Other Preventive Screenings Covered by Medicare:  1. Abdominal Aortic Aneurysm (AAA) Screening: covered once if your at risk. You're considered to be at risk if you have a family history of AAA. 2. Lung Cancer Screening: covers low dose CT scan once per year if you meet all of the following conditions: (1) Age 48-67; (2) No signs or symptoms of lung cancer; (3) Current smoker or have quit smoking within the last 15 years; (4) You have a tobacco smoking history of at least 20 pack years (packs per day multiplied by number of years you smoked); (5) You get a written order from a healthcare provider. 3. Glaucoma Screening: covered annually if you're considered high risk: (1) You have diabetes OR (2) Family history of glaucoma OR (3)  aged 48 and older OR (3)  American aged 72 and older  3. Osteoporosis Screening: covered every 2 years if you meet one of the following conditions: (1) You're estrogen deficient and at risk for osteoporosis based off medical history and other findings; (2) Have a vertebral abnormality; (3) On glucocorticoid therapy for more than 3 months; (4) Have primary hyperparathyroidism; (5) On osteoporosis medications and need to assess response to drug therapy. · Last bone density test (DXA Scan): 10/03/2022.  5. HIV Screening: covered annually if you're between the age of 15-65. Also covered annually if you are younger than 13 and older than 72 with risk factors for HIV infection. For pregnant patients, it is covered up to 3 times per pregnancy.     Immunizations:  Immunization Recommendations   Influenza Vaccine Annual influenza vaccination during flu season is recommended for all persons aged >= 6 months who do not have contraindications   Pneumococcal Vaccine   * Pneumococcal conjugate vaccine = PCV13 (Prevnar 13), PCV15 (Vaxneuvance), PCV20 (Prevnar 20)  * Pneumococcal polysaccharide vaccine = PPSV23 (Pneumovax) Adults 20-63 years old: 1-3 doses may be recommended based on certain risk factors  Adults 72 years old: 1-2 doses may be recommended based off what pneumonia vaccine you previously received   Hepatitis B Vaccine 3 dose series if at intermediate or high risk (ex: diabetes, end stage renal disease, liver disease)   Tetanus (Td) Vaccine - COST NOT COVERED BY MEDICARE PART B Following completion of primary series, a booster dose should be given every 10 years to maintain immunity against tetanus. Td may also be given as tetanus wound prophylaxis. Tdap Vaccine - COST NOT COVERED BY MEDICARE PART B Recommended at least once for all adults. For pregnant patients, recommended with each pregnancy. Shingles Vaccine (Shingrix) - COST NOT COVERED BY MEDICARE PART B  2 shot series recommended in those aged 48 and above     Health Maintenance Due:      Topic Date Due   • Breast Cancer Screening: Mammogram  08/09/2023   • Colorectal Cancer Screening  10/12/2023   • DXA SCAN  10/03/2024   • Cervical Cancer Screening  12/16/2024   • Hepatitis C Screening  Completed     Immunizations Due:      Topic Date Due   • COVID-19 Vaccine (4 - Moderna series) 12/27/2021   • Influenza Vaccine (1) 09/01/2023     Advance Directives   What are advance directives? Advance directives are legal documents that state your wishes and plans for medical care. These plans are made ahead of time in case you lose your ability to make decisions for yourself. Advance directives can apply to any medical decision, such as the treatments you want, and if you want to donate organs. What are the types of advance directives?   There are many types of advance directives, and each state has rules about how to use them. You may choose a combination of any of the following:  · Living will: This is a written record of the treatment you want. You can also choose which treatments you do not want, which to limit, and which to stop at a certain time. This includes surgery, medicine, IV fluid, and tube feedings. · Durable power of  for healthcare [facility]): This is a written record that states who you want to make healthcare choices for you when you are unable to make them for yourself. This person, called a proxy, is usually a family member or a friend. You may choose more than 1 proxy. · Do not resuscitate (DNR) order:  A DNR order is used in case your heart stops beating or you stop breathing. It is a request not to have certain forms of treatment, such as CPR. A DNR order may be included in other types of advance directives. · Medical directive: This covers the care that you want if you are in a coma, near death, or unable to make decisions for yourself. You can list the treatments you want for each condition. Treatment may include pain medicine, surgery, blood transfusions, dialysis, IV or tube feedings, and a ventilator (breathing machine). · Values history: This document has questions about your views, beliefs, and how you feel and think about life. This information can help others choose the care that you would choose. Why are advance directives important? An advance directive helps you control your care. Although spoken wishes may be used, it is better to have your wishes written down. Spoken wishes can be misunderstood, or not followed. Treatments may be given even if you do not want them. An advance directive may make it easier for your family to make difficult choices about your care.    Weight Management   Why it is important to manage your weight:  Being overweight increases your risk of health conditions such as heart disease, high blood pressure, type 2 diabetes, and certain types of cancer. It can also increase your risk for osteoarthritis, sleep apnea, and other respiratory problems. Aim for a slow, steady weight loss. Even a small amount of weight loss can lower your risk of health problems. How to lose weight safely:  A safe and healthy way to lose weight is to eat fewer calories and get regular exercise. You can lose up about 1 pound a week by decreasing the number of calories you eat by 500 calories each day. Healthy meal plan for weight management:  A healthy meal plan includes a variety of foods, contains fewer calories, and helps you stay healthy. A healthy meal plan includes the following:  · Eat whole-grain foods more often. A healthy meal plan should contain fiber. Fiber is the part of grains, fruits, and vegetables that is not broken down by your body. Whole-grain foods are healthy and provide extra fiber in your diet. Some examples of whole-grain foods are whole-wheat breads and pastas, oatmeal, brown rice, and bulgur. · Eat a variety of vegetables every day. Include dark, leafy greens such as spinach, kale, chong greens, and mustard greens. Eat yellow and orange vegetables such as carrots, sweet potatoes, and winter squash. · Eat a variety of fruits every day. Choose fresh or canned fruit (canned in its own juice or light syrup) instead of juice. Fruit juice has very little or no fiber. · Eat low-fat dairy foods. Drink fat-free (skim) milk or 1% milk. Eat fat-free yogurt and low-fat cottage cheese. Try low-fat cheeses such as mozzarella and other reduced-fat cheeses. · Choose meat and other protein foods that are low in fat. Choose beans or other legumes such as split peas or lentils. Choose fish, skinless poultry (chicken or turkey), or lean cuts of red meat (beef or pork). Before you cook meat or poultry, cut off any visible fat. · Use less fat and oil. Try baking foods instead of frying them.  Add less fat, such as margarine, sour cream, regular salad dressing and mayonnaise to foods. Eat fewer high-fat foods. Some examples of high-fat foods include french fries, doughnuts, ice cream, and cakes. · Eat fewer sweets. Limit foods and drinks that are high in sugar. This includes candy, cookies, regular soda, and sweetened drinks. Exercise:  Exercise at least 30 minutes per day on most days of the week. Some examples of exercise include walking, biking, dancing, and swimming. You can also fit in more physical activity by taking the stairs instead of the elevator or parking farther away from stores. Ask your healthcare provider about the best exercise plan for you. © Copyright [employer] 2018 Information is for End User's use only and may not be sold, redistributed or otherwise used for commercial purposes.  All illustrations and images included in CareNotes® are the copyrighted property of [employer] Inc[employer] or [address] [place] [address]

## 2023-07-24 NOTE — PROGRESS NOTES
Assessment and Plan:     Problem List Items Addressed This Visit        Endocrine    Type 2 diabetes mellitus with hyperglycemia, without long-term current use of insulin ([address])       Lab Results   Component Value Date    HGBA1C 6.6 (A) 07/24/2023   Current HA1C: see above    Last HA1C: 6.2    Last GFR: done in 1/2023 53    Urine Microalbumin: Due in 10/2024  Last Foot Exam: Done    Neuropathy symptoms: none    Eye Exam: done           Relevant Medications    metFORMIN (GLUCOPHAGE-XR) 750 mg 24 hr tablet    Other Relevant Orders    POCT hemoglobin A1c (Completed)       Cardiovascular and Mediastinum    Migraine    Relevant Medications    amLODIPine (NORVASC) 2.5 mg tablet    Hypertension     Controlled at this time. 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 Seizure disorder ([address])     Last seizure over 5 years ago. To see new provider for neurology to continue decreasing seizure medications. Genitourinary    Stage 3 chronic kidney disease, unspecified whether stage 3a or 3b CKD (HCC)     Lab Results   Component Value Date    EGFR 53 01/24/2023    EGFR 51 12/01/2021    EGFR 50 10/11/2021    CREATININE 1.05 01/24/2023    CREATININE 1.10 12/01/2021    CREATININE 1.12 10/11/2021               Other    Sjogren's syndrome ([address])     Stable. Hyperlipidemia    Relevant Medications    atorvastatin (LIPITOR) 20 mg tablet    Mild episode of recurrent major depressive disorder (HCC)     Controlled.           Other Visit Diagnoses     Medicare annual wellness visit, subsequent    -  Primary    Essential hypertension        Relevant Medications    amLODIPine (NORVASC) 2.5 mg tablet    Encounter for screening mammogram for breast cancer        Acute left ankle pain              Will continue to monitor her left ankle - no overt swelling no increased bruising. Health Maintenance    Last Mammogram: 08/09/2022    Next Mammogram Scheduled: 08/15/2023    Past DEXA: 10/3/2022    Colorectal Cancer Screening: fabrd - done 10/12/2020       Preventive health issues were discussed with patient, and age appropriate screening tests were ordered as noted in patient's After Visit Summary. Personalized health advice and appropriate referrals for health education or preventive services given if needed, as noted in patient's After Visit Summary. History of Present Illness:     Patient presents for a Medicare Wellness Visit    Here today for an AWV - hx of T2DM,  HA1C increased to 6.6 but remains below 7. Hx of seizures - followed by neurology  - in the process of decreasing her medications. Recently twisted her left ankle approximately 1.5 weeks ago gardening and fell. Notes continuing left ankle pain but denies any new swelling or bruising. Patient Care Team:  Carlos Ayala as PCP - General (Family Medicine)     Review of Systems:     Review of Systems   Constitutional: Negative. HENT: Negative. Respiratory: Negative. Cardiovascular: Negative. Gastrointestinal: Negative. Musculoskeletal:        See HPI   Neurological: Negative. All other systems reviewed and are negative.        Problem List:     Patient Active Problem List   Diagnosis   • Esophageal reflux   • Type 2 diabetes mellitus with hyperglycemia, without long-term current use of insulin (HCC)   • Migraine   • Hypertension   • Sjogren's syndrome (HCC)   • Hyperlipidemia   • Sicca syndrome (HCC)   • Mild episode of recurrent major depressive disorder (HCC)   • 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HCC)   • Seizure disorder (HCC)   • Stage 3 chronic kidney disease, unspecified whether stage 3a or 3b CKD (HCC)   • Post herpetic neuralgia      Past Medical and Surgical History:     Past Medical History:   Diagnosis Date   • Cervicalgia    • Depression    • Diabetes mellitus ([address])    • Esophageal reflux    • Hyperlipidemia    • Hypertension     Essential   • Migraine    • Nonspecific abnormal electrocardiogram (ECG)    • Sciatica    • Seizure disorder (HCC)    • Sjogren's syndrome (HCC)      Past Surgical History:   Procedure Laterality Date   • CERVICAL POLYPECTOMY     • CRANIOTOMY       Left frontal craniotomy for resection of tumor- [address]    • TUBAL LIGATION        Family History:     Family History   Problem Relation Age of Onset   • Aneurysm Mother    • Heart disease Father    • Lupus Sister    • Thyroid cancer Sister         age unknown   • No Known Problems Sister    • No Known Problems Sister    • Diabetes Maternal Grandmother    • No Known Problems Maternal Grandfather    • No Known Problems Paternal Grandmother    • No Known Problems Paternal Grandfather    • Heart disease Brother    • Diabetes Maternal Aunt    • No Known Problems Maternal Aunt    • No Known Problems Maternal Aunt    • No Known Problems Paternal Aunt    • Breast cancer Neg Hx    • BRCA2 Positive Neg Hx    • BRCA1 Positive Neg Hx    • Endometrial cancer Neg Hx    • Colon cancer Neg Hx    • Ovarian cancer Neg Hx    • BRCA1 Negative Neg Hx    • BRCA2 Negative Neg Hx    • Breast cancer additional onset Neg Hx    • BRCA 1/2 Neg Hx       Social History:     Social History     Socioeconomic History   • Marital status: /Civil Union     Spouse name: None   • Number of children: None   • Years of education: None   • Highest education level: None   Occupational History   • None   Tobacco Use   • Smoking status: Never   • Smokeless tobacco: Never   Vaping Use   • Vaping Use: Never used   Substance and Sexual Activity   • Alcohol use: Never   • Drug use: Never   • Sexual activity: Not Currently   Other Topics Concern   • None   Social History Narrative   • None     Social Determinants of Health     Financial Resource Strain: Low Risk  (7/24/2023)    Overall Financial Resource Strain (CARDIA)    • Difficulty of Paying Living Expenses: Not hard at all   Food Insecurity: Not on file   Transportation Needs: No Transportation Needs (7/24/2023)    PRAPARE - Transportation    • Lack of Transportation (Medical): No    • Lack of Transportation (Non-Medical): No   Physical Activity: Not on file   Stress: Not on file   Social Connections: Not on file   Intimate Partner Violence: Not on file   Housing Stability: Not on file      Medications and Allergies:     Current Outpatient Medications   Medication Sig Dispense Refill   • amLODIPine (NORVASC) 2.5 mg tablet Take 2 tablets (5 mg total) by mouth daily 180 tablet 1   • ASPIRIN 81 PO Take 81 mg by mouth daily     • atorvastatin (LIPITOR) 20 mg tablet Take 1 tablet (20 mg total) by mouth daily at bedtime 90 tablet 1   • calcium carbonate (TUMS) 500 mg chewable tablet Chew 1 tablet daily     • escitalopram (LEXAPRO) 20 mg tablet      • gabapentin (NEURONTIN) 300 mg capsule Take 1 capsule (300 mg total) by mouth 3 (three) times a day 270 capsule 1   • levETIRAcetam (KEPPRA) 750 mg tablet Take 750 mg by mouth 2 (two) times a day 1.2 pill in am, 1 pill in pm     • metFORMIN (GLUCOPHAGE-XR) 750 mg 24 hr tablet Take 1 tablet (750 mg total) by mouth daily with breakfast 90 tablet 1   • Multiple Vitamins-Minerals (MULTIVITAMIN WITH MINERALS) tablet Take 1 tablet by mouth daily     • topiramate (TOPAMAX) 100 mg tablet Take 150 mg by mouth 2 (two) times a day       • traZODone (DESYREL) 100 mg tablet Take 1 tablet (100 mg total) by mouth daily at bedtime 90 tablet 1   • venlafaxine (EFFEXOR-XR) 75 mg 24 hr capsule Take 1 capsule (75 mg total) by mouth daily 90 capsule 1   • lansoprazole (PREVACID) 15 mg capsule Take 1 capsule (15 mg total) by mouth daily 90 capsule 1     No current facility-administered medications for this visit.      Allergies   Allergen Reactions   • Lamotrigine      Other reaction(s): LAMOTRIGINE (LAMICTAL) rash      Immunizations: Immunization History   Administered Date(s) Administered   • COVID-19 MODERNA VACC 0.5 ML IM 03/19/2021, 04/19/2021, 11/01/2021   • INFLUENZA 11/02/2005, 10/08/2008, 09/22/2009, 10/07/2010, 10/10/2012, 09/24/2013, 10/07/2016, 10/12/2016, 09/26/2017, 09/17/2018, 09/08/2020   • Influenza Quadrivalent Preservative Free 3 years and older IM 10/12/2016   • Influenza Split High Dose Preservative Free IM 09/26/2017, 09/17/2018   • Influenza, high dose seasonal 0.7 mL 10/04/2019   • Influenza, seasonal, injectable, preservative free 10/04/2015   • Pneumococcal Conjugate 13-Valent 09/26/2017   • Pneumococcal Polysaccharide PPV23 12/23/2020   • Zoster Vaccine Recombinant 12/23/2020, 05/03/2021      Health Maintenance:         Topic Date Due   • Breast Cancer Screening: Mammogram  08/09/2023   • Colorectal Cancer Screening  10/12/2023   • DXA SCAN  10/03/2024   • Cervical Cancer Screening  12/16/2024   • Hepatitis C Screening  Completed         Topic Date Due   • COVID-19 Vaccine (4 - Valora Axon series) 12/27/2021   • Influenza Vaccine (1) 09/01/2023      Medicare Screening Tests and Risk Assessments:     Niles Cranker is here for her Subsequent Wellness visit. Health Risk Assessment:   Patient rates overall health as very good. Patient feels that their physical health rating is same. Patient is very satisfied with their life. Eyesight was rated as same. Hearing was rated as same. Patient feels that their emotional and mental health rating is same. Patients states they are sometimes angry. Patient states they are sometimes unusually tired/fatigued. Pain experienced in the last 7 days has been none. Patient states that she has experienced no weight loss or gain in last 6 months. Fall Risk Screening: In the past year, patient has experienced: no history of falling in past year      Urinary Incontinence Screening:   Patient has not leaked urine accidently in the last six months.      Home Safety:  Patient does not have trouble with stairs inside or outside of their home. Patient has working smoke alarms and has working carbon monoxide detector. Home safety hazards include: none. Nutrition:   Current diet is Regular. Medications:   Patient is not currently taking any over-the-counter supplements. Patient is able to manage medications. Activities of Daily Living (ADLs)/Instrumental Activities of Daily Living (IADLs):   Walk and transfer into and out of bed and chair?: Yes  Dress and groom yourself?: Yes    Bathe or shower yourself?: Yes    Feed yourself?  Yes  Do your laundry/housekeeping?: Yes  Manage your money, pay your bills and track your expenses?: Yes  Make your own meals?: Yes    Do your own shopping?: Yes    Advance Care Planning:   Living will: No    Durable POA for healthcare: No    Advanced directive: Yes    Advanced directive counseling given: No    Five wishes given: No    Patient declined ACP directive: Yes    End of Life Decisions reviewed with patient: No    Provider agrees with end of life decisions: Yes      Cognitive Screening:   Provider or family/friend/caregiver concerned regarding cognition?: No    PREVENTIVE SCREENINGS      Cardiovascular Screening:    General: Screening Not Indicated and History Lipid Disorder      Diabetes Screening:     General: Screening Not Indicated and History Diabetes      Colorectal Cancer Screening:     General: Screening Current      Breast Cancer Screening:     General: Screening Current      Cervical Cancer Screening:    General: Screening Not Indicated      Osteoporosis Screening:    General: Screening Current      Abdominal Aortic Aneurysm (AAA) Screening:        General: Screening Not Indicated      Lung Cancer Screening:     General: Screening Not Indicated      Hepatitis C Screening:    General: Screening Current    Screening, Brief Intervention, and Referral to Treatment (SBIRT)    Screening  Typical number of drinks in a day: 0  Typical number of drinks in a week: 0  Interpretation: Low risk drinking behavior. Physical Exam:     /65 (BP Location: Left arm, Patient Position: Sitting, Cuff Size: Standard)   Pulse 65   Temp 97.5 °F (36.4 °C)   Ht 5' 4" (1.626 m)   Wt 81.6 kg (180 lb)   SpO2 99%   BMI 30.90 kg/m²     Physical Exam  Cardiovascular:      Rate and Rhythm: Normal rate and regular rhythm. Heart sounds: Normal heart sounds. Pulmonary:      Effort: Pulmonary effort is normal.      Breath sounds: Normal breath sounds. Musculoskeletal:      Left ankle: Ecchymosis present. Legs:    Neurological:      Mental Status: She is alert and oriented to person, place, and time.           Jo Anthony

## 2023-07-24 NOTE — ASSESSMENT & PLAN NOTE
Lab Results   Component Value Date    EGFR 53 01/24/2023    EGFR 51 12/01/2021    EGFR 50 10/11/2021    CREATININE 1.05 01/24/2023    CREATININE 1.10 12/01/2021    CREATININE 1.12 10/11/2021

## 2023-07-24 NOTE — ASSESSMENT & PLAN NOTE
Lab Results   Component Value Date    HGBA1C 6.6 (A) 07/24/2023   Current HA1C: see above    Last HA1C: 6.2    Last GFR: done in 1/2023 53    Urine Microalbumin: Due in 10/2024  Last Foot Exam: Done    Neuropathy symptoms: none    Eye Exam: done

## 2023-07-24 NOTE — ASSESSMENT & PLAN NOTE
Last seizure over 5 years ago. To see new provider for neurology to continue decreasing seizure medications.

## 2023-08-15 ENCOUNTER — HOSPITAL ENCOUNTER (OUTPATIENT)
Dept: RADIOLOGY | Facility: CLINIC | Age: 73
Discharge: HOME | End: 2023-08-15
Payer: MEDICARE

## 2023-08-15 VITALS — HEIGHT: 64 IN | BODY MASS INDEX: 30.56 KG/M2 | WEIGHT: 179 LBS

## 2023-08-15 DIAGNOSIS — Z12.31 ENCOUNTER FOR SCREENING MAMMOGRAM FOR MALIGNANT NEOPLASM OF BREAST: ICD-10-CM

## 2023-08-15 PROCEDURE — 77067 SCR MAMMO BI INCL CAD: CPT

## 2023-08-15 PROCEDURE — 77063 BREAST TOMOSYNTHESIS BI: CPT

## 2023-09-13 ENCOUNTER — OFFICE VISIT (OUTPATIENT)
Dept: FAMILY MEDICINE CLINIC | Facility: CLINIC | Age: 73
End: 2023-09-13
Payer: MEDICARE

## 2023-09-13 VITALS
HEIGHT: 64 IN | DIASTOLIC BLOOD PRESSURE: 61 MMHG | TEMPERATURE: 97.5 F | OXYGEN SATURATION: 97 % | BODY MASS INDEX: 31.07 KG/M2 | WEIGHT: 182 LBS | SYSTOLIC BLOOD PRESSURE: 136 MMHG | HEART RATE: 67 BPM

## 2023-09-13 DIAGNOSIS — R05.1 ACUTE COUGH: ICD-10-CM

## 2023-09-13 DIAGNOSIS — R09.81 NASAL CONGESTION: Primary | ICD-10-CM

## 2023-09-13 DIAGNOSIS — M79.10 MYALGIA: ICD-10-CM

## 2023-09-13 LAB
SARS-COV-2 AG UPPER RESP QL IA: NEGATIVE
VALID CONTROL: NORMAL

## 2023-09-13 PROCEDURE — 87811 SARS-COV-2 COVID19 W/OPTIC: CPT | Performed by: NURSE PRACTITIONER

## 2023-09-13 PROCEDURE — 99213 OFFICE O/P EST LOW 20 MIN: CPT | Performed by: NURSE PRACTITIONER

## 2023-09-13 RX ORDER — AMOXICILLIN 500 MG/1
500 CAPSULE ORAL EVERY 12 HOURS SCHEDULED
Qty: 14 CAPSULE | Refills: 0 | Status: SHIPPED | OUTPATIENT
Start: 2023-09-13 | End: 2023-09-20

## 2023-09-13 NOTE — PROGRESS NOTES
Name: Liberty Traylor      : 1950      MRN: [id number]  Encounter Provider: JENI Mantilla  Encounter Date: 2023   Encounter department: [address] [place] [address] PRIMARY CARE    Assessment & Plan     1. Nasal congestion  -     amoxicillin (AMOXIL) 500 mg capsule; Take 1 capsule (500 mg total) by mouth every 12 (twelve) hours for 7 days  -     POCT Rapid Covid Ag    2. Acute cough  -     amoxicillin (AMOXIL) 500 mg capsule; Take 1 capsule (500 mg total) by mouth every 12 (twelve) hours for 7 days  -     POCT Rapid Covid Ag    3. Myalgia  -     amoxicillin (AMOXIL) 500 mg capsule; Take 1 capsule (500 mg total) by mouth every 12 (twelve) hours for 7 days  -     POCT Rapid Covid Ag    POCT COVID negative. Will treat with course of oral abx - concern regarding her chest congestion and cough. Subjective      Here today for an acute visit. Reports onset of nasal congestion, cough, body aches starting two days ago. Review of Systems   HENT: Positive for congestion, sinus pressure and sinus pain. Respiratory: Positive for cough. Musculoskeletal: Positive for myalgias. All other systems reviewed and are negative.       Current Outpatient Medications on File Prior to Visit   Medication Sig   • amLODIPine (NORVASC) 2.5 mg tablet Take 2 tablets (5 mg total) by mouth daily   • ASPIRIN 81 PO Take 81 mg by mouth daily   • atorvastatin (LIPITOR) 20 mg tablet Take 1 tablet (20 mg total) by mouth daily at bedtime   • calcium carbonate (TUMS) 500 mg chewable tablet Chew 1 tablet daily   • escitalopram (LEXAPRO) 20 mg tablet    • gabapentin (NEURONTIN) 300 mg capsule Take 1 capsule (300 mg total) by mouth 3 (three) times a day   • levETIRAcetam (KEPPRA) 750 mg tablet Take 750 mg by mouth 2 (two) times a day 1.2 pill in am, 1 pill in pm   • metFORMIN (GLUCOPHAGE-XR) 750 mg 24 hr tablet Take 1 tablet (750 mg total) by mouth daily with breakfast   • Multiple Vitamins-Minerals (MULTIVITAMIN WITH MINERALS) tablet Take 1 tablet by mouth daily   • topiramate (TOPAMAX) 100 mg tablet Take 150 mg by mouth 2 (two) times a day     • traZODone (DESYREL) 100 mg tablet Take 1 tablet (100 mg total) by mouth daily at bedtime   • venlafaxine (EFFEXOR-XR) 75 mg 24 hr capsule Take 1 capsule (75 mg total) by mouth daily   • lansoprazole (PREVACID) 15 mg capsule Take 1 capsule (15 mg total) by mouth daily       Objective     /61 (BP Location: Left arm, Patient Position: Sitting, Cuff Size: Standard)   Pulse 67   Temp 97.5 °F (36.4 °C)   Ht 5' 4" (1.626 m)   Wt 82.6 kg (182 lb)   SpO2 97%   BMI 31.24 kg/m²     Physical Exam  Vitals reviewed. Constitutional:       Appearance: Normal appearance. HENT:      Nose: Congestion present. Pulmonary:      Effort: Pulmonary effort is normal.      Breath sounds: Normal breath sounds. Lymphadenopathy:      Cervical: No cervical adenopathy. Neurological:      Mental Status: She is alert and oriented to person, place, and time.        Sandra Ruiz

## 2023-09-29 ENCOUNTER — OFFICE VISIT (OUTPATIENT)
Dept: FAMILY MEDICINE CLINIC | Facility: CLINIC | Age: 73
End: 2023-09-29
Payer: MEDICARE

## 2023-09-29 VITALS
DIASTOLIC BLOOD PRESSURE: 72 MMHG | WEIGHT: 184.3 LBS | SYSTOLIC BLOOD PRESSURE: 128 MMHG | BODY MASS INDEX: 31.47 KG/M2 | OXYGEN SATURATION: 97 % | HEIGHT: 64 IN | HEART RATE: 73 BPM | TEMPERATURE: 97.5 F

## 2023-09-29 DIAGNOSIS — L23.7 POISON IVY DERMATITIS: Primary | ICD-10-CM

## 2023-09-29 PROCEDURE — 99213 OFFICE O/P EST LOW 20 MIN: CPT | Performed by: FAMILY MEDICINE

## 2023-09-29 RX ORDER — PREDNISONE 10 MG/1
TABLET ORAL
Qty: 24 TABLET | Refills: 0 | Status: SHIPPED | OUTPATIENT
Start: 2023-09-29 | End: 2023-10-11

## 2023-09-29 NOTE — PROGRESS NOTES
Assessment/Plan:       1. Poison ivy dermatitis  Comments:  start prednisone  watch sugars  Orders:  -     predniSONE 10 mg tablet; Take 3 tablets (30 mg total) by mouth daily for 4 days, THEN 2 tablets (20 mg total) daily for 4 days, THEN 1 tablet (10 mg total) daily for 4 days. Subjective:      Patient ID: Luisa Nye is a 72 y.o. female. María Elena Nayak was doing some weeding about a week ago now has poison ivy it has spread it is quite itchy it is only improved very minimally over the past week. Poison Ivy  Pertinent negatives include no cough, eye pain, fever, shortness of breath, sore throat or vomiting. The following portions of the patient's history were reviewed and updated as appropriate: allergies, current medications, past family history, past medical history, past social history, past surgical history, and problem list.    Review of Systems   Constitutional: Negative for chills and fever. HENT: Negative for ear pain and sore throat. Eyes: Negative for pain and visual disturbance. Respiratory: Negative. Negative for cough and shortness of breath. Cardiovascular: Negative. Negative for chest pain and palpitations. Gastrointestinal: Negative for abdominal pain and vomiting. Genitourinary: Negative for dysuria and hematuria. Musculoskeletal: Negative for arthralgias and back pain. Skin: Negative for color change and rash. Neurological: Negative for seizures and syncope. All other systems reviewed and are negative. Objective:      /72 (BP Location: Right arm, Patient Position: Sitting, Cuff Size: Large)   Pulse 73   Temp 97.5 °F (36.4 °C)   Ht 5' 4" (1.626 m)   Wt 83.6 kg (184 lb 4.8 oz)   SpO2 97%   BMI 31.64 kg/m²          Physical Exam  Vitals and nursing note reviewed. Constitutional:       Appearance: Normal appearance. HENT:      Head: Normocephalic and atraumatic.       Nose: Nose normal.      Mouth/Throat:      Mouth: Mucous membranes are moist.   Eyes:      Extraocular Movements: Extraocular movements intact. Pupils: Pupils are equal, round, and reactive to light. Cardiovascular:      Rate and Rhythm: Normal rate and regular rhythm. Pulses: Normal pulses. Pulmonary:      Effort: Pulmonary effort is normal.      Breath sounds: Normal breath sounds. Abdominal:      General: Bowel sounds are normal.      Palpations: Abdomen is soft. Musculoskeletal:      Cervical back: Normal range of motion. Skin:     General: Skin is warm and dry. Capillary Refill: Capillary refill takes less than 2 seconds. Comments: Patches of poison ivy on arms and face and legs   Neurological:      General: No focal deficit present. Mental Status: She is alert.    Psychiatric:         Mood and Affect: Mood normal.

## 2023-10-24 ENCOUNTER — OFFICE VISIT (OUTPATIENT)
Dept: FAMILY MEDICINE CLINIC | Facility: CLINIC | Age: 73
End: 2023-10-24
Payer: MEDICARE

## 2023-10-24 VITALS
HEART RATE: 68 BPM | WEIGHT: 187 LBS | OXYGEN SATURATION: 97 % | HEIGHT: 64 IN | DIASTOLIC BLOOD PRESSURE: 82 MMHG | SYSTOLIC BLOOD PRESSURE: 128 MMHG | BODY MASS INDEX: 31.92 KG/M2

## 2023-10-24 DIAGNOSIS — Z23 ENCOUNTER FOR IMMUNIZATION: ICD-10-CM

## 2023-10-24 DIAGNOSIS — F33.0 MILD EPISODE OF RECURRENT MAJOR DEPRESSIVE DISORDER (HCC): ICD-10-CM

## 2023-10-24 DIAGNOSIS — G43.909 MIGRAINE WITHOUT STATUS MIGRAINOSUS, NOT INTRACTABLE, UNSPECIFIED MIGRAINE TYPE: ICD-10-CM

## 2023-10-24 DIAGNOSIS — N18.30 STAGE 3 CHRONIC KIDNEY DISEASE, UNSPECIFIED WHETHER STAGE 3A OR 3B CKD (HCC): ICD-10-CM

## 2023-10-24 DIAGNOSIS — Z12.12 SCREENING FOR COLORECTAL CANCER: ICD-10-CM

## 2023-10-24 DIAGNOSIS — I10 PRIMARY HYPERTENSION: ICD-10-CM

## 2023-10-24 DIAGNOSIS — Z12.11 SCREENING FOR COLORECTAL CANCER: ICD-10-CM

## 2023-10-24 DIAGNOSIS — M35.00 SICCA SYNDROME (HCC): ICD-10-CM

## 2023-10-24 DIAGNOSIS — E11.65 TYPE 2 DIABETES MELLITUS WITH HYPERGLYCEMIA, WITHOUT LONG-TERM CURRENT USE OF INSULIN (HCC): Primary | ICD-10-CM

## 2023-10-24 LAB
CREAT UR-MCNC: 128.8 MG/DL
MICROALBUMIN UR-MCNC: 18.5 MG/L
MICROALBUMIN/CREAT 24H UR: 14 MG/G CREATININE (ref 0–30)
SL AMB POCT HEMOGLOBIN AIC: 7.4 (ref ?–6.5)

## 2023-10-24 PROCEDURE — 82570 ASSAY OF URINE CREATININE: CPT | Performed by: NURSE PRACTITIONER

## 2023-10-24 PROCEDURE — 99214 OFFICE O/P EST MOD 30 MIN: CPT | Performed by: NURSE PRACTITIONER

## 2023-10-24 PROCEDURE — G0009 ADMIN PNEUMOCOCCAL VACCINE: HCPCS | Performed by: NURSE PRACTITIONER

## 2023-10-24 PROCEDURE — 83036 HEMOGLOBIN GLYCOSYLATED A1C: CPT | Performed by: NURSE PRACTITIONER

## 2023-10-24 PROCEDURE — 82043 UR ALBUMIN QUANTITATIVE: CPT | Performed by: NURSE PRACTITIONER

## 2023-10-24 PROCEDURE — 90677 PCV20 VACCINE IM: CPT | Performed by: NURSE PRACTITIONER

## 2023-10-24 NOTE — PROGRESS NOTES
Name: Kathy Coffey      : 1950      MRN: [id number]  Encounter Provider: JENI Munoz  Encounter Date: 10/24/2023   Encounter department: [address] [place] [address] PRIMARY CARE    Assessment & Plan     1. Type 2 diabetes mellitus with hyperglycemia, without long-term current use of insulin (HCC)  Assessment & Plan:    Lab Results   Component Value Date    HGBA1C 7.4 (A) 10/24/2023   Current HA1C:Elevated, suspect due to prednisone use. Will recheck in 3 months. Last HA1C:6.6    Last GFR: 53 (2024)    Urine Microalbumin: Urine obtained today    Last Foot Exam: Done for     Neuropathy symptoms: none    Eye Exam:  will go to [facility]    Orders:  -     POCT hemoglobin A1c  -     Albumin / creatinine urine ratio; Future; Expected date: 10/24/2023  -     Albumin / creatinine urine ratio    2. Sicca syndrome ([address])  Assessment & Plan:  Controlled. 3. Migraine without status migrainosus, not intractable, unspecified migraine type  Assessment & Plan:  Stable, no issue. 4. Primary hypertension  Assessment & Plan:  Controlled. 5. Screening for colorectal cancer  -     Cologuard    6. Encounter for immunization  -     Pneumococcal Conjugate Vaccine 20-valent (Pcv20)    7. Mild episode of recurrent major depressive disorder [facility])  Assessment & Plan:  Controlled. 8. Stage 3 chronic kidney disease, unspecified whether stage 3a or 3b CKD [facility])  Assessment & Plan:  Lab Results   Component Value Date    EGFR 53 2023    EGFR 51 2021    EGFR 50 10/11/2021    CREATININE 1.05 2023    CREATININE 1.10 2021    CREATININE 1.12 10/11/2021           BMI Counseling: Body mass index is 32.1 kg/m². The BMI is above normal. Nutrition recommendations include encouraging healthy choices of fruits and vegetables. Exercise recommendations include moderate physical activity 150 minutes/week.  Rationale for BMI follow-up plan is due to patient being overweight or obese.         Subjective      Here  today for a follow-up. She is with a hx of T2DM. Also reports recent poison ivy for which she was seen for one month ago. She was prescribed prednisone for treatment. Her HA1C is elevated compared to previous one - notes she has been compliant with her diet and has remained active. Hx of HTN - controlled with medication. Review of Systems   Constitutional: Negative. HENT: Negative. Respiratory: Negative. Cardiovascular: Negative. Gastrointestinal: Negative. Neurological: Negative. All other systems reviewed and are negative.       Current Outpatient Medications on File Prior to Visit   Medication Sig    amLODIPine (NORVASC) 2.5 mg tablet Take 2 tablets (5 mg total) by mouth daily    ASPIRIN 81 PO Take 81 mg by mouth daily    atorvastatin (LIPITOR) 20 mg tablet Take 1 tablet (20 mg total) by mouth daily at bedtime    calcium carbonate (TUMS) 500 mg chewable tablet Chew 1 tablet daily    escitalopram (LEXAPRO) 20 mg tablet     gabapentin (NEURONTIN) 300 mg capsule Take 1 capsule (300 mg total) by mouth 3 (three) times a day    lansoprazole (PREVACID) 15 mg capsule Take 1 capsule (15 mg total) by mouth daily    levETIRAcetam (KEPPRA) 750 mg tablet Take 750 mg by mouth 2 (two) times a day 1.2 pill in am, 1 pill in pm    metFORMIN (GLUCOPHAGE-XR) 750 mg 24 hr tablet Take 1 tablet (750 mg total) by mouth daily with breakfast    Multiple Vitamins-Minerals (MULTIVITAMIN WITH MINERALS) tablet Take 1 tablet by mouth daily    topiramate (TOPAMAX) 100 mg tablet Take 150 mg by mouth 2 (two) times a day      traZODone (DESYREL) 100 mg tablet Take 1 tablet (100 mg total) by mouth daily at bedtime    venlafaxine (EFFEXOR-XR) 75 mg 24 hr capsule Take 1 capsule (75 mg total) by mouth daily       Objective     /82   Pulse 68   Ht 5' 4" (1.626 m)   Wt 84.8 kg (187 lb)   SpO2 97%   BMI 32.10 kg/m²     Physical Exam  Constitutional:       Appearance: Normal appearance. Cardiovascular:      Rate and Rhythm: Normal rate and regular rhythm. Pulmonary:      Effort: Pulmonary effort is normal.      Breath sounds: Normal breath sounds. Neurological:      Mental Status: She is alert. Psychiatric:         Mood and Affect: Mood normal.         Behavior: Behavior normal.         Thought Content:  Thought content normal.         Judgment: Judgment normal.       JENI Polo

## 2023-10-24 NOTE — ASSESSMENT & PLAN NOTE
Lab Results   Component Value Date    HGBA1C 7.4 (A) 10/24/2023   Current HA1C:Elevated, suspect due to prednisone use. Will recheck in 3 months.       Last HA1C:6.6    Last GFR: 53 (01/2024)    Urine Microalbumin: Urine obtained today    Last Foot Exam: Done for 2023    Neuropathy symptoms: none    Eye Exam:  will go to [facility]

## 2023-11-09 LAB
COLOGUARD RESULT REPORTABLE: NEGATIVE
LEFT EYE DIABETIC RETINOPATHY: NORMAL
RIGHT EYE DIABETIC RETINOPATHY: NORMAL

## 2023-11-23 DIAGNOSIS — B02.29 POST HERPETIC NEURALGIA: ICD-10-CM

## 2023-11-24 RX ORDER — GABAPENTIN 300 MG/1
300 CAPSULE ORAL 3 TIMES DAILY
Qty: 270 CAPSULE | Refills: 3 | Status: SHIPPED | OUTPATIENT
Start: 2023-11-24

## 2023-12-08 ENCOUNTER — OFFICE VISIT (OUTPATIENT)
Dept: FAMILY MEDICINE CLINIC | Facility: CLINIC | Age: 73
End: 2023-12-08
Payer: MEDICARE

## 2023-12-08 ENCOUNTER — APPOINTMENT (OUTPATIENT)
Dept: RADIOLOGY | Facility: CLINIC | Age: 73
End: 2023-12-08
Payer: MEDICARE

## 2023-12-08 ENCOUNTER — APPOINTMENT (OUTPATIENT)
Dept: LAB | Facility: CLINIC | Age: 73
End: 2023-12-08
Payer: MEDICARE

## 2023-12-08 VITALS
DIASTOLIC BLOOD PRESSURE: 78 MMHG | HEIGHT: 64 IN | TEMPERATURE: 97.5 F | HEART RATE: 71 BPM | SYSTOLIC BLOOD PRESSURE: 130 MMHG | OXYGEN SATURATION: 97 % | WEIGHT: 190.4 LBS | BODY MASS INDEX: 32.5 KG/M2

## 2023-12-08 DIAGNOSIS — M25.562 ACUTE PAIN OF LEFT KNEE: ICD-10-CM

## 2023-12-08 DIAGNOSIS — M35.00 SJOGREN'S SYNDROME, WITH UNSPECIFIED ORGAN INVOLVEMENT (HCC): ICD-10-CM

## 2023-12-08 DIAGNOSIS — M25.562 ACUTE PAIN OF LEFT KNEE: Primary | ICD-10-CM

## 2023-12-08 DIAGNOSIS — Z87.39 HISTORY OF RHEUMATOID ARTHRITIS: ICD-10-CM

## 2023-12-08 LAB
ALBUMIN SERPL BCP-MCNC: 4 G/DL (ref 3.5–5)
ALP SERPL-CCNC: 63 U/L (ref 34–104)
ALT SERPL W P-5'-P-CCNC: 10 U/L (ref 7–52)
ANA SER QL IA: POSITIVE
ANION GAP SERPL CALCULATED.3IONS-SCNC: 7 MMOL/L
AST SERPL W P-5'-P-CCNC: 14 U/L (ref 13–39)
BASOPHILS # BLD AUTO: 0.05 THOUSANDS/ÂΜL (ref 0–0.1)
BASOPHILS NFR BLD AUTO: 1 % (ref 0–1)
BILIRUB SERPL-MCNC: 0.26 MG/DL (ref 0.2–1)
BUN SERPL-MCNC: 17 MG/DL (ref 5–25)
CALCIUM SERPL-MCNC: 10.1 MG/DL (ref 8.4–10.2)
CHLORIDE SERPL-SCNC: 104 MMOL/L (ref 96–108)
CO2 SERPL-SCNC: 29 MMOL/L (ref 21–32)
CREAT SERPL-MCNC: 0.98 MG/DL (ref 0.6–1.3)
CRP SERPL QL: <1 MG/L
EOSINOPHIL # BLD AUTO: 0.48 THOUSAND/ÂΜL (ref 0–0.61)
EOSINOPHIL NFR BLD AUTO: 7 % (ref 0–6)
ERYTHROCYTE [DISTWIDTH] IN BLOOD BY AUTOMATED COUNT: 12.2 % (ref 11.6–15.1)
GFR SERPL CREATININE-BSD FRML MDRD: 58 ML/MIN/1.73SQ M
GLUCOSE P FAST SERPL-MCNC: 197 MG/DL (ref 65–99)
HCT VFR BLD AUTO: 41.8 % (ref 34.8–46.1)
HGB BLD-MCNC: 13.6 G/DL (ref 11.5–15.4)
IMM GRANULOCYTES # BLD AUTO: 0.02 THOUSAND/UL (ref 0–0.2)
IMM GRANULOCYTES NFR BLD AUTO: 0 % (ref 0–2)
LYMPHOCYTES # BLD AUTO: 1.98 THOUSANDS/ÂΜL (ref 0.6–4.47)
LYMPHOCYTES NFR BLD AUTO: 27 % (ref 14–44)
MCH RBC QN AUTO: 31.6 PG (ref 26.8–34.3)
MCHC RBC AUTO-ENTMCNC: 32.5 G/DL (ref 31.4–37.4)
MCV RBC AUTO: 97 FL (ref 82–98)
MONOCYTES # BLD AUTO: 0.37 THOUSAND/ÂΜL (ref 0.17–1.22)
MONOCYTES NFR BLD AUTO: 5 % (ref 4–12)
NEUTROPHILS # BLD AUTO: 4.54 THOUSANDS/ÂΜL (ref 1.85–7.62)
NEUTS SEG NFR BLD AUTO: 60 % (ref 43–75)
NRBC BLD AUTO-RTO: 0 /100 WBCS
PLATELET # BLD AUTO: 247 THOUSANDS/UL (ref 149–390)
PMV BLD AUTO: 10.7 FL (ref 8.9–12.7)
POTASSIUM SERPL-SCNC: 4.3 MMOL/L (ref 3.5–5.3)
PROT SERPL-MCNC: 6.9 G/DL (ref 6.4–8.4)
RBC # BLD AUTO: 4.31 MILLION/UL (ref 3.81–5.12)
SODIUM SERPL-SCNC: 140 MMOL/L (ref 135–147)
WBC # BLD AUTO: 7.44 THOUSAND/UL (ref 4.31–10.16)

## 2023-12-08 PROCEDURE — 36415 COLL VENOUS BLD VENIPUNCTURE: CPT

## 2023-12-08 PROCEDURE — 73562 X-RAY EXAM OF KNEE 3: CPT

## 2023-12-08 PROCEDURE — 86039 ANTINUCLEAR ANTIBODIES (ANA): CPT

## 2023-12-08 PROCEDURE — 86225 DNA ANTIBODY NATIVE: CPT

## 2023-12-08 PROCEDURE — 80053 COMPREHEN METABOLIC PANEL: CPT

## 2023-12-08 PROCEDURE — 86038 ANTINUCLEAR ANTIBODIES: CPT

## 2023-12-08 PROCEDURE — 85025 COMPLETE CBC W/AUTO DIFF WBC: CPT

## 2023-12-08 PROCEDURE — 86140 C-REACTIVE PROTEIN: CPT

## 2023-12-08 PROCEDURE — 86430 RHEUMATOID FACTOR TEST QUAL: CPT

## 2023-12-08 PROCEDURE — 86235 NUCLEAR ANTIGEN ANTIBODY: CPT

## 2023-12-08 PROCEDURE — 99213 OFFICE O/P EST LOW 20 MIN: CPT | Performed by: NURSE PRACTITIONER

## 2023-12-08 RX ORDER — CHLORHEXIDINE GLUCONATE ORAL RINSE 1.2 MG/ML
SOLUTION DENTAL
COMMUNITY
Start: 2023-12-04

## 2023-12-08 RX ORDER — ACETAMINOPHEN AND CODEINE PHOSPHATE 300; 30 MG/1; MG/1
1 TABLET ORAL
COMMUNITY
Start: 2023-12-04

## 2023-12-08 RX ORDER — AMOXICILLIN AND CLAVULANATE POTASSIUM 500; 125 MG/1; MG/1
TABLET, FILM COATED ORAL
COMMUNITY
Start: 2023-12-04

## 2023-12-08 RX ORDER — AMOXICILLIN 500 MG/1
500 TABLET, FILM COATED ORAL 3 TIMES DAILY
COMMUNITY
Start: 2023-11-07

## 2023-12-08 NOTE — PROGRESS NOTES
Name: Jeremy Baron      : 1950      MRN: [id number]  Encounter Provider: JENI Lopez  Encounter Date: 2023   Encounter department: [facility] PRIMARY CARE    Assessment & Plan     1. Acute pain of left knee  -     XR knee 3 vw left non injury; Future; Expected date: 2023  -     RF Screen w/ Reflex to Titer; Future  -     PATRICK Screen w/ Reflex to Titer/Pattern; Future  -     C-reactive protein; Future    2. Sjogren's syndrome, with unspecified organ involvement ([address] )  -     RF Screen w/ Reflex to Titer; Future  -     PATRICK Screen w/ Reflex to Titer/Pattern; Future  -     C-reactive protein; Future  -     CBC and differential; Future  -     Comprehensive metabolic panel; Future    3. History of rheumatoid arthritis  -     RF Screen w/ Reflex to Titer; Future  -     PATRICK Screen w/ Reflex to Titer/Pattern; Future  -     C-reactive protein; Future  -     CBC and differential; Future  -     Comprehensive metabolic panel; Future    She is with the past hx of rheumatoid arthritis that has been in remission - will check antibody levels and will xray left knee. Discussed possible treatment options - ortho vs PT. Subjective      Here today with her . Reports onset of left knee pain starting 4 days ago. She denies any overt injury. Reports pain going up and down the stairs. With going up being worse. Tenderness to front of knee right below the knee cap. She also reports some swelling. She feels as though that knee may give out at times. When she is walking she can feel her left knee "catching."      Her  states that she was diagnosed with rheumatoid arthritis in her 20's but was told she was in remission. She then was diagnosed with sjogren's later on and then was told that was in remission. She denies any other joint pain. Knee Pain       Review of Systems   Musculoskeletal:         See HPI   All other systems reviewed and are negative.       Current Outpatient Medications on File Prior to Visit   Medication Sig   • acetaminophen-codeine (TYLENOL with CODEINE #3) 300-30 MG per tablet Take 1 tablet by mouth every 4 to 6 hours as needed for pain   • amLODIPine (NORVASC) 2.5 mg tablet Take 2 tablets (5 mg total) by mouth daily   • amoxicillin (AMOXIL) 500 MG tablet Take 500 mg by mouth 3 (three) times a day   • amoxicillin-clavulanate (AUGMENTIN) 500-125 mg per tablet TAKE 1 TABLET BY MOUTH TWICE DAILY UNTIL GONE   • ASPIRIN 81 PO Take 81 mg by mouth daily   • atorvastatin (LIPITOR) 20 mg tablet Take 1 tablet (20 mg total) by mouth daily at bedtime   • calcium carbonate (TUMS) 500 mg chewable tablet Chew 1 tablet daily   • chlorhexidine (PERIDEX) 0.12 % solution RINSE WITH 15ML FOR 30 SECONDS AND SPIT, USE TWICE DAILY AFTER BRUSHING AND FLOSSING . • escitalopram (LEXAPRO) 20 mg tablet    • gabapentin (NEURONTIN) 300 mg capsule TAKE 1 CAPSULE THREE TIMES A DAY   • levETIRAcetam (KEPPRA) 750 mg tablet Take 750 mg by mouth 2 (two) times a day 1.2 pill in am, 1 pill in pm   • metFORMIN (GLUCOPHAGE-XR) 750 mg 24 hr tablet Take 1 tablet (750 mg total) by mouth daily with breakfast   • Multiple Vitamins-Minerals (MULTIVITAMIN WITH MINERALS) tablet Take 1 tablet by mouth daily   • topiramate (TOPAMAX) 100 mg tablet Take 150 mg by mouth 2 (two) times a day     • traZODone (DESYREL) 100 mg tablet Take 1 tablet (100 mg total) by mouth daily at bedtime   • venlafaxine (EFFEXOR-XR) 75 mg 24 hr capsule Take 1 capsule (75 mg total) by mouth daily   • lansoprazole (PREVACID) 15 mg capsule Take 1 capsule (15 mg total) by mouth daily       Objective     /78 (BP Location: Right arm, Patient Position: Sitting, Cuff Size: Standard)   Pulse 71   Temp 97.5 °F (36.4 °C)   Ht 5' 4" (1.626 m)   Wt 86.4 kg (190 lb 6.4 oz)   SpO2 97%   BMI 32.68 kg/m²     Physical Exam  Musculoskeletal:      Left knee: Swelling present. Decreased range of motion. Tenderness present. Legs:        Jose Peterson

## 2023-12-09 LAB — RHEUMATOID FACT SER QL LA: NEGATIVE

## 2023-12-11 ENCOUNTER — TELEPHONE (OUTPATIENT)
Dept: FAMILY MEDICINE CLINIC | Facility: CLINIC | Age: 73
End: 2023-12-11

## 2023-12-11 LAB
ANA HOMOGEN SER QL IF: NORMAL
ANA HOMOGEN TITR SER: NORMAL {TITER}
DSDNA AB SER-ACNC: <4 IU/ML
ENA RNP AB SER IA-ACNC: NEGATIVE
ENA SM AB SER IA-ACNC: NEGATIVE
ENA SM+RNP IGG SER-ACNC: NEGATIVE
ENA SS-A AB SER IA-ACNC: POSITIVE
ENA SS-B AB SER IA-ACNC: POSITIVE

## 2023-12-11 NOTE — TELEPHONE ENCOUNTER
----- Message from Madai Hussein, [facility] sent at 12/11/2023  9:54 AM EST -----  Can we please reach out to Eufemia Valencia - her knee xray came back with evidence for mild arthritis throughout the knee. Given this, she may benefit from seeing orthopedics first to see if they recommend an injection or physical therapy. I also reviewed her lab results - there is an increase with the antibodies that are consistent with her Sjogren's but no direct inflammation. I would say that the pain in the knee at this time is due to osteoarthritis only. If she would like a referral to ortho please let me know. We have Dr. Abhijit Sheth at [facility] and also Dr. Jericho Cullen at our [facility] office. Thanks.

## 2023-12-12 ENCOUNTER — TELEPHONE (OUTPATIENT)
Dept: FAMILY MEDICINE CLINIC | Facility: CLINIC | Age: 73
End: 2023-12-12

## 2023-12-12 DIAGNOSIS — M17.12 TRICOMPARTMENT OSTEOARTHRITIS OF LEFT KNEE: ICD-10-CM

## 2023-12-12 DIAGNOSIS — M25.562 ACUTE PAIN OF LEFT KNEE: Primary | ICD-10-CM

## 2023-12-15 ENCOUNTER — OFFICE VISIT (OUTPATIENT)
Dept: OBGYN CLINIC | Facility: CLINIC | Age: 73
End: 2023-12-15
Payer: MEDICARE

## 2023-12-15 VITALS
HEART RATE: 73 BPM | DIASTOLIC BLOOD PRESSURE: 80 MMHG | WEIGHT: 191 LBS | TEMPERATURE: 98 F | BODY MASS INDEX: 32.61 KG/M2 | SYSTOLIC BLOOD PRESSURE: 130 MMHG | HEIGHT: 64 IN

## 2023-12-15 DIAGNOSIS — M17.12 TRICOMPARTMENT OSTEOARTHRITIS OF LEFT KNEE: ICD-10-CM

## 2023-12-15 DIAGNOSIS — M25.562 ACUTE PAIN OF LEFT KNEE: Primary | ICD-10-CM

## 2023-12-15 PROCEDURE — 99203 OFFICE O/P NEW LOW 30 MIN: CPT | Performed by: STUDENT IN AN ORGANIZED HEALTH CARE EDUCATION/TRAINING PROGRAM

## 2023-12-15 RX ORDER — LANOLIN ALCOHOL/MO/W.PET/CERES
1 CREAM (GRAM) TOPICAL 2 TIMES DAILY
COMMUNITY

## 2023-12-15 NOTE — PROGRESS NOTES
"1. Acute pain of left knee  Ambulatory Referral to Orthopedic Surgery      2. Tricompartment osteoarthritis of left knee  Ambulatory Referral to Orthopedic Surgery        No orders of the defined types were placed in this encounter.       Imaging Studies (I personally reviewed images in PACS and report):    X-ray left knee 12/8/2023: Mild tricompartmental osteoarthritic changes.  No acute osseous abnormalities.  No joint effusion.    IMPRESSION:  Acute atraumatic left knee pain  Primary osteoarthritis of the left knee    Other factors:  BMI 33    PLAN:    Clinical exam and radiographic imaging reviewed with patient today, with impression as per above. I have discussed with the patient the pathophysiology of this diagnosis and reviewed how the examination correlates with this diagnosis.    Recent imaging of her left knee reviewed today as noted above.   Treatment options were discussed at length, including risks and benefits; after discussing these treatment options, the patient elected to defer intra-articular cortisone injection of her knee in favor of more conservative treatments.  Thus, I counseled conservative treatments include as needed use of acetaminophen, oral/topical NSAIDs, heat/ice therapy 20 minutes on/off, use of compression knee sleeves during activities.  I also did offer formal physical therapy as well but patient deferred this option.  Patient prefers to follow up PRN if she decides to have injection in the future.    Return if symptoms worsen or fail to improve.    Portions of the record may have been created with voice recognition software. Occasional wrong word or \"sound a like\" substitutions may have occurred due to the inherent limitations of voice recognition software. Read the chart carefully and recognize, using context, where substitutions have occurred.     CHIEF COMPLAINT:  Chief Complaint   Patient presents with    Left Knee - Pain         HPI:  Kimberly Kowalski is a 73 y.o. female  who " presents in regards to referral from JENI Davis, for       Visit 12/15/2023:  Initial evaluation of left knee pain:  Ongoing for the past 2 weeks.  Denies any precipitating injury  She noticed that she was progressively feeling pain around her kneecap and over the medial aspect while ascending and descending stairs frequently.  She describes as a sharp/aching pain of mild to moderate intensity that worsened the longer she was on her feet.  She states the pain can become severe enough that she feels that her knee will give out.  She is unsure of swelling but denies discoloration, N/T of her left lower extremity. Reports knee crepitus with ROM. Reports stiffness of her knee towards the end of the day.  Pain does not wake her up at night.  She does not use any bracing with activities.  Reports use of OTC acetaminophen which does provide some relief.  Has never had an injection of her knee previously.  She has not had prior surgeries of her left knee in the past.  She did see her PCP for this issue and recently had imaging done as noted above.  PCP note reviewed.          Medical, Surgical, Family, and Social History    Past Medical History:   Diagnosis Date    Cervicalgia     Depression     Diabetes mellitus (HCC)     Esophageal reflux     Hyperlipidemia     Hypertension     Essential    Migraine     Nonspecific abnormal electrocardiogram (ECG)     Sciatica     Seizure disorder (HCC)     Sjogren's syndrome (HCC)      Past Surgical History:   Procedure Laterality Date    CERVICAL POLYPECTOMY      CRANIOTOMY       Left frontal craniotomy for resection of tumor-Dr. Jose Luis Murillo [facility]     TUBAL LIGATION       Social History   Social History     Substance and Sexual Activity   Alcohol Use Never     Social History     Substance and Sexual Activity   Drug Use Never     Social History     Tobacco Use   Smoking Status Never   Smokeless Tobacco Never     Family History   Problem Relation Age of  "Onset    Aneurysm Mother     Heart disease Father     Lupus Sister     Thyroid cancer Sister         age unknown    No Known Problems Sister     No Known Problems Sister     Diabetes Maternal Grandmother     No Known Problems Maternal Grandfather     No Known Problems Paternal Grandmother     No Known Problems Paternal Grandfather     Heart disease Brother     Diabetes Maternal Aunt     No Known Problems Maternal Aunt     No Known Problems Maternal Aunt     No Known Problems Paternal Aunt     Breast cancer Neg Hx     BRCA2 Positive Neg Hx     BRCA1 Positive Neg Hx     Endometrial cancer Neg Hx     Colon cancer Neg Hx     Ovarian cancer Neg Hx     BRCA1 Negative Neg Hx     BRCA2 Negative Neg Hx     Breast cancer additional onset Neg Hx     BRCA 1/2 Neg Hx      Allergies   Allergen Reactions    Lamotrigine      Other reaction(s): LAMOTRIGINE (LAMICTAL) rash          Physical Exam  /80   Pulse 73   Temp 98 °F (36.7 °C)   Ht 5' 4\" (1.626 m)   Wt 86.6 kg (191 lb)   BMI 32.79 kg/m²     Constitutional:  see vital signs  Gen: obese, normocephalic/atraumatic, well-groomed  Eyes: No inflammation or discharge of conjunctiva or lids; sclera clear   Pulmonary/Chest: Effort normal. No respiratory distress.     Ortho Exam  Left knee Exam:  Erythema: no  Swelling: no  Increased Warmth: no  Tenderness: +mild over medial patellofemoral joint line, +MJL  ROM: 0-130  Knee flexion strength: 5/5  Knee extension strength: 5/5  Patellar Grind: +  Lachman's: negative  Varus laxity: negative  Valgus laxity: negative  Nicolette: negative     No calf tenderness to palpation     Procedures        "

## 2023-12-31 NOTE — PATIENT INSTRUCTIONS
Bladder Diet      If you have overactive bladder,  how much you drink and what you drink can affect how much you go.      Beverages:  Water - too much water can cause urinary frequency  Caffeine - coffees, has teas, energy drinks and sodas should be avoided  Chocolate  Alcohol   Fruit juices - juices high in acidity such as orange, grapefruit can cause overactive bladder.  Choose from pears, blueberries, carrots and watermelon is more preferable  Carbonated beverages    Foods:  Processed foods  Processed meats such as smoked and deli meats  Canned meats  Tomatoes  Fruits high in acidity  Spicy foods  Vinegar  Horseradish  Onions  Artificial sweeteners    Good foods:   Fruits - blueberries, bananas, cantaloupe, figs, honeydew, raisins, watermelon  Veggies - asparagus, avocados, green beans, beets, broccoli, brussels sprouts, carrots, cauliflower, cucumbers, eggplant, peas, spinach, squash, zucchini, white and sweet potatoes, legumes  Proteins - beef, eggs, chicken, pork, lamb, fish, shellfish, nuts   Carbs - rice, breads, pretzels, popcorn, cookies, pastries, pudding, cakes  Dairy: milk products low in cultures, non-aged cheeses  Bladder Training    Bladder is an important form of behavior therapy that can be effective in treating urinary incontinence.    The goals are to increase the amount of time between emptying your bladder and the amount of fluid your bladder can hold. It can also diminish the sense of urgency or leakage.    Bladder training requires that a  fixed voiding schedule be established, whether or not the urge to urinate is present.  If urge occurs before the assigned interval, urge suppression techniques (such as relaxation and Kegel's exercises) should be used. As success is achieved, the interval is lengthened in 15-30 minutes increments until it is possible to remain comfortable for 3-4 hours between voids. This goal can be individualized to suit each woman's needs and  desires.      Instructions:    Empty your bladder as soon as you get up in the morning, This starts your retraining schedule.  Go to the bathroom at specific times for example, every 2 hours. Wait the full amount of time before you urinate again. Empty your bladder even if you do not have the urge to urinate at this time.  Follow this schedule during waking hours only.  During the nighttime, go to the bathroom only if you weekend and find it necessary.  When the urge to urinate is felt before the next designated time, use urge suppression techniques or try relaxation techniques like deep breathing.  Focus on relaxing all other muscles.  If possible, sit down until the sensation passes.  If the urge is suppressed, adhere to the schedule.  If you cannot suppress the urge, wait 5 minutes then slowly make your way to the bathroom then re-established the schedule.  Repeat this process every time an urge is felt.  When you have accomplished your initial goal, gradually increase the time between emptying her bladder by 15 minutes intervals.  Try to increase her interval each week, but you will be the best  of how quickly you can advanced to the next step.  Increase the time between each urination until your reach a 3-4 hour voiding interval.  It should take between 6-12 weeks to accomplish her ultimate goal.  Don't be discouraged by set-backs.  You may find you might have good days and bad days.  As you continue bladder retraining, you will start to notice more and more good days, so keep practicing.  You will hasten your success by doing your pelvic muscles exercises faithfully every day.    KEGEL EXERCISES      Kegel, or pelvic floor muscle exercises are done to strengthen the muscles, which support the urethra, bladder, uterus and rectum.    The benefits of achieving and maintaining good pelvic floor muscle tone are many and can include prevention of urinary dribbling when coughing, laughing, sneezing or  exercising.    It also may possibly prevent need for surgery later in life for pelvic organ prolapse.    How to do Kegel exercises:    To identify the correct muscle, sit on the toilet with legs spread apart.  Begin to urinate, then contract the muscle necessary to stop the flow of urine, hold and release.    After you determine your hay the correct muscle, do not stop and start and stop the flow of urine except as an occasional check.  We recommended you incorporate Kegel exercises and urine normal bathroom routine.  After you empty your bladder, remain seated on the toilet and complete 10 Kegel exercises.          Wellness Visit for Adults   AMBULATORY CARE:   A wellness visit  is when you see your healthcare provider to get screened for health problems. Your healthcare provider will also give you advice on how to stay healthy. Write down your questions so you remember to ask them. Ask your healthcare provider how often you should have a wellness visit.  What happens at a wellness visit:  Your healthcare provider will ask about your health, and your family history of health problems. This includes high blood pressure, heart disease, and cancer. He or she will ask if you have symptoms that concern you, if you smoke, and about your mood. You may also be asked about your intake of medicines, supplements, food, and alcohol. Any of the following may be done:  Your weight  will be checked. Your height may also be checked so your body mass index (BMI) can be calculated. Your BMI shows if you are at a healthy weight.    Your blood pressure  and heart rate will be checked. Your temperature may also be checked.    Blood and urine tests  may be done. Blood tests may be done to check your cholesterol levels. Abnormal cholesterol levels increase your risk for heart disease and stroke. You may also need a blood or urine test to check for diabetes if you are at increased risk. Urine tests may be done to look for signs of  an infection or kidney disease.    A physical exam  includes checking your heartbeat and lungs with a stethoscope. Your healthcare provider may also check your skin to look for sun damage.    Screening tests  may be recommended. A screening test is done to check for diseases that may not cause symptoms. The screening tests you may need depend on your age, gender, family history, and lifestyle habits. For example, colorectal screening may be recommended if you are 50 years old or older.    Screening tests you need if you are a woman:   A Pap smear  is used to screen for cervical cancer. Pap smears are usually done every 3 to 5 years depending on your age. You may need them more often if you have had abnormal Pap smear test results in the past. Ask your healthcare provider how often you should have a Pap smear.    A mammogram  is an x-ray of your breasts to screen for breast cancer. Experts recommend mammograms every 2 years starting at age 50 years. You may need a mammogram at age 49 years or younger if you have an increased risk for breast cancer. Talk to your healthcare provider about when you should start having mammograms and how often you need them.    Vaccines you may need:   Get an influenza vaccine  every year. The influenza vaccine protects you from the flu. Several types of viruses cause the flu. The viruses change over time, so new vaccines are made each year.    Get a tetanus-diphtheria (Td) booster vaccine  every 10 years. This vaccine protects you against tetanus and diphtheria. Tetanus is a severe infection that may cause painful muscle spasms and lockjaw. Diphtheria is a severe bacterial infection that causes a thick covering in the back of your mouth and throat.    Get a human papillomavirus (HPV) vaccine  if you are female and aged 19 to 26 or male 19 to 21 and never received it. This vaccine protects you from HPV infection. HPV is the most common infection spread by sexual contact. HPV may also  cause vaginal, penile, and anal cancers.    Get a pneumococcal vaccine  if you are aged 65 years or older. The pneumococcal vaccine is an injection given to protect you from pneumococcal disease. Pneumococcal disease is an infection caused by pneumococcal bacteria. The infection may cause pneumonia, meningitis, or an ear infection.    Get a shingles vaccine  if you are 60 or older, even if you have had shingles before. The shingles vaccine is an injection to protect you from the varicella-zoster virus. This is the same virus that causes chickenpox. Shingles is a painful rash that develops in people who had chickenpox or have been exposed to the virus.    How to eat healthy:  My Plate is a model for planning healthy meals. It shows the types and amounts of foods that should go on your plate. Fruits and vegetables make up about half of your plate, and grains and protein make up the other half. A serving of dairy is included on the side of your plate. The amount of calories and serving sizes you need depends on your age, gender, weight, and height. Examples of healthy foods are listed below:  Eat a variety of vegetables  such as dark green, red, and orange vegetables. You can also include canned vegetables low in sodium (salt) and frozen vegetables without added butter or sauces.    Eat a variety of fresh fruits , canned fruit in 100% juice, frozen fruit, and dried fruit.    Include whole grains.  At least half of the grains you eat should be whole grains. Examples include whole-wheat bread, wheat pasta, brown rice, and whole-grain cereals such as oatmeal.    Eat a variety of protein foods such as seafood (fish and shellfish), lean meat, and poultry without skin (turkey and chicken). Examples of lean meats include pork leg, shoulder, or tenderloin, and beef round, sirloin, tenderloin, and extra lean ground beef. Other protein foods include eggs and egg substitutes, beans, peas, soy products, nuts, and seeds.    Choose  low-fat dairy products such as skim or 1% milk or low-fat yogurt, cheese, and cottage cheese.    Limit unhealthy fats  such as butter, hard margarine, and shortening.       Exercise:  Exercise at least 30 minutes per day on most days of the week. Some examples of exercise include walking, biking, dancing, and swimming. You can also fit in more physical activity by taking the stairs instead of the elevator or parking farther away from stores. Include muscle strengthening activities 2 days each week. Regular exercise provides many health benefits. It helps you manage your weight, and decreases your risk for type 2 diabetes, heart disease, stroke, and high blood pressure. Exercise can also help improve your mood. Ask your healthcare provider about the best exercise plan for you.       General health and safety guidelines:   Do not smoke.  Nicotine and other chemicals in cigarettes and cigars can cause lung damage. Ask your healthcare provider for information if you currently smoke and need help to quit. E-cigarettes or smokeless tobacco still contain nicotine. Talk to your healthcare provider before you use these products.    Limit alcohol.  A drink of alcohol is 12 ounces of beer, 5 ounces of wine, or 1½ ounces of liquor.    Lose weight, if needed.  Being overweight increases your risk of certain health conditions. These include heart disease, high blood pressure, type 2 diabetes, and certain types of cancer.    Protect your skin.  Do not sunbathe or use tanning beds. Use sunscreen with a SPF 15 or higher. Apply sunscreen at least 15 minutes before you go outside. Reapply sunscreen every 2 hours. Wear protective clothing, hats, and sunglasses when you are outside.    Drive safely.  Always wear your seatbelt. Make sure everyone in your car wears a seatbelt. A seatbelt can save your life if you are in an accident. Do not use your cell phone when you are driving. This could distract you and cause an accident. Pull over  if you need to make a call or send a text message.    Practice safe sex.  Use latex condoms if are sexually active and have more than one partner. Your healthcare provider may recommend screening tests for sexually transmitted infections (STIs).    Wear helmets, lifejackets, and protective gear.  Always wear a helmet when you ride a bike or motorcycle, go skiing, or play sports that could cause a head injury. Wear protective equipment when you play sports. Wear a lifejacket when you are on a boat or doing water sports.    © Copyright [employer] 2023 Information is for End User's use only and may not be sold, redistributed or otherwise used for commercial purposes.  The above information is an  only. It is not intended as medical advice for individual conditions or treatments. Talk to your doctor, nurse or pharmacist before following any medical regimen to see if it is safe and effective for you.

## 2023-12-31 NOTE — PROGRESS NOTES
Assessment        Diagnoses and all orders for this visit:    Encntr for gyn exam (general) (routine) w abnormal findings    Hot flashes due to menopause    Menopause  -     DXA bone density spine hip and pelvis; Future    Encounter for screening mammogram for breast cancer  -     Mammo screening bilateral w 3d & cad; Future    Osteopenia after menopause  -     DXA bone density spine hip and pelvis; Future    Urinary frequency  -     Urinalysis with microscopic  -     Urine culture; Future             Plan      All questions answered.  Diagnosis explained in detail, including differential.  Discussed healthy lifestyle modifications.  Educational material distributed.  Follow up in 1 year.  Follow up as needed.  Urinalysis.  Urine culture and sensitivity.   PAP deferred  Discussed OAB - urine studies ordered  Discussed Kegels, bladder diet and bladder training - she will try these for now, can consider meds  DEXA        Subjective      Kimberly Kowalski is a 73 y.o. female who presents for annual exam.      Chief Complaint   Patient presents with    Gynecologic Exam     Off estradiol for almost 1 year  She has no flashes and no vaginal bleeding.  She has no problems today  She has urinary frequency.  She has vaginal dryness.  Has nocturia 3-4x. No incontinence.      Last Pap: 21  Last mammogram: 8/15/23  Colorectal cancer screenin cologuard normal  DEXA: 10/3/22 osteopenia    Current contraception: post menopausal status  History of abnormal Pap smear: no  History of abnormal mammogram: no  Family history of uterine or ovarian cancer: no  Family history of breast cancer: no  Family history of colon cancer: no    OB History    Para Term  AB Living   1 1 1 0 0 1   SAB IAB Ectopic Multiple Live Births   0 0 0 0 1      # Outcome Date GA Lbr Wilbur/2nd Weight Sex Delivery Anes PTL Lv   1 Term      Vag-Spont          Menstrual History:  OB History          1    Para   1    Term   1        0    AB   0    Living   1         SAB   0    IAB   0    Ectopic   0    Multiple   0    Live Births   1                Menarche age: 15  No LMP recorded. Patient is postmenopausal.             Past Medical History:   Diagnosis Date    Cervicalgia     Depression     Diabetes mellitus (HCC)     Esophageal reflux     Hyperlipidemia     Hypertension     Essential    Migraine     Nonspecific abnormal electrocardiogram (ECG)     Sciatica     Seizure disorder (HCC)     Sjogren's syndrome (HCC)      Past Surgical History:   Procedure Laterality Date    CERVICAL POLYPECTOMY      CRANIOTOMY       Left frontal craniotomy for resection of tumor-Dr. [facility]     TUBAL LIGATION       Family History   Problem Relation Age of Onset    Aneurysm Mother     Heart disease Father     Lupus Sister     Thyroid cancer Sister         age unknown    No Known Problems Sister     No Known Problems Sister     Diabetes Maternal Grandmother     No Known Problems Maternal Grandfather     No Known Problems Paternal Grandmother     No Known Problems Paternal Grandfather     Heart disease Brother     Diabetes Maternal Aunt     No Known Problems Maternal Aunt     No Known Problems Maternal Aunt     No Known Problems Paternal Aunt     Breast cancer Neg Hx     BRCA2 Positive Neg Hx     BRCA1 Positive Neg Hx     Endometrial cancer Neg Hx     Colon cancer Neg Hx     Ovarian cancer Neg Hx     BRCA1 Negative Neg Hx     BRCA2 Negative Neg Hx     Breast cancer additional onset Neg Hx     BRCA 1/2 Neg Hx        Social History     Tobacco Use    Smoking status: Never    Smokeless tobacco: Never   Vaping Use    Vaping status: Never Used   Substance Use Topics    Alcohol use: Never    Drug use: Never          Current Outpatient Medications:     ASPIRIN 81 PO, Take 81 mg by mouth daily, Disp: , Rfl:     atorvastatin (LIPITOR) 20 mg tablet, Take 1 tablet (20 mg total) by mouth daily at bedtime, Disp: 90 tablet, Rfl: 1    calcium  citrate-vitamin D 315 mg-5 mcg tablet, Take 1 tablet by mouth 2 (two) times a day, Disp: , Rfl:     escitalopram (LEXAPRO) 20 mg tablet, , Disp: , Rfl:     gabapentin (NEURONTIN) 300 mg capsule, TAKE 1 CAPSULE THREE TIMES A DAY, Disp: 270 capsule, Rfl: 3    levETIRAcetam (KEPPRA) 750 mg tablet, Take 750 mg by mouth 2 (two) times a day 1.2 pill in am, 1 pill in pm, Disp: , Rfl:     metFORMIN (GLUCOPHAGE-XR) 750 mg 24 hr tablet, Take 1 tablet (750 mg total) by mouth daily with breakfast, Disp: 90 tablet, Rfl: 1    Multiple Vitamins-Minerals (MULTIVITAMIN WITH MINERALS) tablet, Take 1 tablet by mouth daily, Disp: , Rfl:     topiramate (TOPAMAX) 100 mg tablet, Take 150 mg by mouth 2 (two) times a day  , Disp: , Rfl:     traZODone (DESYREL) 100 mg tablet, Take 1 tablet (100 mg total) by mouth daily at bedtime, Disp: 90 tablet, Rfl: 1    venlafaxine (EFFEXOR-XR) 75 mg 24 hr capsule, Take 1 capsule (75 mg total) by mouth daily, Disp: 90 capsule, Rfl: 1    acetaminophen-codeine (TYLENOL with CODEINE #3) 300-30 MG per tablet, Take 1 tablet by mouth every 4 to 6 hours as needed for pain (Patient not taking: Reported on 12/15/2023), Disp: , Rfl:     amLODIPine (NORVASC) 2.5 mg tablet, Take 2 tablets (5 mg total) by mouth daily, Disp: 180 tablet, Rfl: 1    amoxicillin (AMOXIL) 500 MG tablet, Take 500 mg by mouth 3 (three) times a day (Patient not taking: Reported on 12/15/2023), Disp: , Rfl:     amoxicillin-clavulanate (AUGMENTIN) 500-125 mg per tablet, TAKE 1 TABLET BY MOUTH TWICE DAILY UNTIL GONE (Patient not taking: Reported on 12/15/2023), Disp: , Rfl:     calcium carbonate (TUMS) 500 mg chewable tablet, Chew 1 tablet daily (Patient not taking: Reported on 1/4/2024), Disp: , Rfl:     chlorhexidine (PERIDEX) 0.12 % solution, RINSE WITH 15ML FOR 30 SECONDS AND SPIT, USE TWICE DAILY AFTER BRUSHING AND FLOSSING . (Patient not taking: Reported on 1/4/2024), Disp: , Rfl:     lansoprazole (PREVACID) 15 mg capsule, Take 1 capsule  "(15 mg total) by mouth daily, Disp: 90 capsule, Rfl: 1    Allergies   Allergen Reactions    Lamotrigine      Other reaction(s): LAMOTRIGINE (LAMICTAL) rash           Review of Systems   Constitutional: Negative.    HENT: Negative.     Eyes: Negative.    Respiratory: Negative.     Cardiovascular: Negative.    Gastrointestinal: Negative.    Endocrine: Negative.    Genitourinary:         As noted in HPI   Musculoskeletal: Negative.    Skin: Negative.    Allergic/Immunologic: Negative.    Neurological: Negative.    Hematological: Negative.    Psychiatric/Behavioral: Negative.         /78 (BP Location: Right arm, Patient Position: Sitting, Cuff Size: Large)   Pulse 75   Ht 5' 4\" (1.626 m)   Wt 86.6 kg (191 lb)   BMI 32.79 kg/m²         Physical Exam  Constitutional:       Appearance: She is well-developed.   Genitourinary:      Vulva, bladder and rectum normal.      No lesions in the vagina.      Genitourinary Comments:         Right Labia: No rash, tenderness, lesions, skin changes or Bartholin's cyst.     Left Labia: No tenderness, lesions, skin changes, Bartholin's cyst or rash.     No inguinal adenopathy present in the right or left side.     No vaginal discharge, tenderness or bleeding.      No vaginal prolapse present.     No vaginal atrophy present.       Right Adnexa: not tender, not full and no mass present.     Left Adnexa: not tender, not full and no mass present.     No cervical motion tenderness, friability, lesion or polyp.      Uterus is not enlarged or tender.      Pelvic exam was performed with patient in the lithotomy position.   Rectum:      No external hemorrhoid.   Breasts:     Right: No mass, nipple discharge, skin change or tenderness.      Left: No mass, nipple discharge, skin change or tenderness.   HENT:      Head: Normocephalic.      Nose: Nose normal.   Eyes:      Conjunctiva/sclera: Conjunctivae normal.   Neck:      Thyroid: No thyromegaly.   Cardiovascular:      Rate and Rhythm: " Normal rate and regular rhythm.      Heart sounds: Normal heart sounds. No murmur heard.  Pulmonary:      Effort: Pulmonary effort is normal. No respiratory distress.      Breath sounds: Normal breath sounds. No wheezing or rales.   Abdominal:      General: There is no distension.      Palpations: Abdomen is soft. There is no mass.      Tenderness: There is no abdominal tenderness. There is no guarding or rebound.   Musculoskeletal:         General: No tenderness.      Cervical back: Neck supple. No muscular tenderness.   Lymphadenopathy:      Cervical: No cervical adenopathy.      Lower Body: No right inguinal adenopathy. No left inguinal adenopathy.   Neurological:      Mental Status: She is alert and oriented to person, place, and time.   Skin:     General: Skin is warm and dry.   Psychiatric:         Mood and Affect: Mood normal.         Behavior: Behavior normal.             Future Appointments   Date Time Provider Department Center   1/25/2024  8:30 AM JENI Davis  [facility] [place]   8/16/2024  9:30 AM [facility]

## 2024-01-04 ENCOUNTER — ANNUAL EXAM (OUTPATIENT)
Dept: OBGYN CLINIC | Facility: CLINIC | Age: 74
End: 2024-01-04
Payer: MEDICARE

## 2024-01-04 VITALS
HEART RATE: 75 BPM | HEIGHT: 64 IN | BODY MASS INDEX: 32.61 KG/M2 | WEIGHT: 191 LBS | SYSTOLIC BLOOD PRESSURE: 128 MMHG | DIASTOLIC BLOOD PRESSURE: 78 MMHG

## 2024-01-04 DIAGNOSIS — Z01.411 ENCNTR FOR GYN EXAM (GENERAL) (ROUTINE) W ABNORMAL FINDINGS: Primary | ICD-10-CM

## 2024-01-04 DIAGNOSIS — Z78.0 MENOPAUSE: ICD-10-CM

## 2024-01-04 DIAGNOSIS — N95.1 HOT FLASHES DUE TO MENOPAUSE: ICD-10-CM

## 2024-01-04 DIAGNOSIS — R35.0 URINARY FREQUENCY: ICD-10-CM

## 2024-01-04 DIAGNOSIS — Z12.31 ENCOUNTER FOR SCREENING MAMMOGRAM FOR BREAST CANCER: ICD-10-CM

## 2024-01-04 DIAGNOSIS — Z78.0 OSTEOPENIA AFTER MENOPAUSE: ICD-10-CM

## 2024-01-04 DIAGNOSIS — M85.80 OSTEOPENIA AFTER MENOPAUSE: ICD-10-CM

## 2024-01-04 LAB
BACTERIA UR QL AUTO: ABNORMAL /HPF
BILIRUB UR QL STRIP: NEGATIVE
CAOX CRY URNS QL MICRO: ABNORMAL /HPF
CLARITY UR: CLEAR
COLOR UR: ABNORMAL
GLUCOSE UR STRIP-MCNC: NEGATIVE MG/DL
HGB UR QL STRIP.AUTO: NEGATIVE
HYALINE CASTS #/AREA URNS LPF: ABNORMAL /LPF
KETONES UR STRIP-MCNC: NEGATIVE MG/DL
LEUKOCYTE ESTERASE UR QL STRIP: NEGATIVE
MUCOUS THREADS UR QL AUTO: ABNORMAL
NITRITE UR QL STRIP: NEGATIVE
NON-SQ EPI CELLS URNS QL MICRO: ABNORMAL /HPF
PH UR STRIP.AUTO: 5.5 [PH]
PROT UR STRIP-MCNC: ABNORMAL MG/DL
RBC #/AREA URNS AUTO: ABNORMAL /HPF
RENAL EPI CELLS #/AREA URNS HPF: PRESENT /[HPF]
SP GR UR STRIP.AUTO: 1.02 (ref 1–1.03)
UROBILINOGEN UR STRIP-ACNC: <2 MG/DL
WBC #/AREA URNS AUTO: ABNORMAL /HPF

## 2024-01-04 PROCEDURE — 81001 URINALYSIS AUTO W/SCOPE: CPT | Performed by: OBSTETRICS & GYNECOLOGY

## 2024-01-04 PROCEDURE — 87086 URINE CULTURE/COLONY COUNT: CPT | Performed by: OBSTETRICS & GYNECOLOGY

## 2024-01-04 PROCEDURE — G0101 CA SCREEN;PELVIC/BREAST EXAM: HCPCS | Performed by: OBSTETRICS & GYNECOLOGY

## 2024-01-05 LAB — BACTERIA UR CULT: NORMAL

## 2024-01-25 ENCOUNTER — OFFICE VISIT (OUTPATIENT)
Dept: FAMILY MEDICINE CLINIC | Facility: CLINIC | Age: 74
End: 2024-01-25
Payer: MEDICARE

## 2024-01-25 VITALS
SYSTOLIC BLOOD PRESSURE: 124 MMHG | BODY MASS INDEX: 33.05 KG/M2 | WEIGHT: 193.6 LBS | HEART RATE: 67 BPM | OXYGEN SATURATION: 97 % | HEIGHT: 64 IN | TEMPERATURE: 97.8 F | DIASTOLIC BLOOD PRESSURE: 82 MMHG

## 2024-01-25 DIAGNOSIS — M17.12 PRIMARY OSTEOARTHRITIS OF LEFT KNEE: ICD-10-CM

## 2024-01-25 DIAGNOSIS — E78.49 OTHER HYPERLIPIDEMIA: ICD-10-CM

## 2024-01-25 DIAGNOSIS — F33.0 MILD EPISODE OF RECURRENT MAJOR DEPRESSIVE DISORDER (HCC): ICD-10-CM

## 2024-01-25 DIAGNOSIS — I10 ESSENTIAL HYPERTENSION: ICD-10-CM

## 2024-01-25 DIAGNOSIS — B02.29 POST HERPETIC NEURALGIA: ICD-10-CM

## 2024-01-25 DIAGNOSIS — Z87.39 HISTORY OF RHEUMATOID ARTHRITIS: ICD-10-CM

## 2024-01-25 DIAGNOSIS — M35.00 SJOGREN'S SYNDROME, WITH UNSPECIFIED ORGAN INVOLVEMENT (HCC): ICD-10-CM

## 2024-01-25 DIAGNOSIS — N18.30 STAGE 3 CHRONIC KIDNEY DISEASE, UNSPECIFIED WHETHER STAGE 3A OR 3B CKD (HCC): ICD-10-CM

## 2024-01-25 DIAGNOSIS — M35.00 SICCA SYNDROME (HCC): ICD-10-CM

## 2024-01-25 DIAGNOSIS — G40.909 SEIZURE DISORDER (HCC): ICD-10-CM

## 2024-01-25 DIAGNOSIS — D43.2 GANGLIOGLIOMA OF BRAIN (HCC): ICD-10-CM

## 2024-01-25 DIAGNOSIS — I10 PRIMARY HYPERTENSION: ICD-10-CM

## 2024-01-25 DIAGNOSIS — E11.65 TYPE 2 DIABETES MELLITUS WITH HYPERGLYCEMIA, WITHOUT LONG-TERM CURRENT USE OF INSULIN (HCC): Primary | ICD-10-CM

## 2024-01-25 LAB — SL AMB POCT HEMOGLOBIN AIC: 8.4 (ref ?–6.5)

## 2024-01-25 PROCEDURE — 99214 OFFICE O/P EST MOD 30 MIN: CPT | Performed by: NURSE PRACTITIONER

## 2024-01-25 PROCEDURE — 83036 HEMOGLOBIN GLYCOSYLATED A1C: CPT | Performed by: NURSE PRACTITIONER

## 2024-01-25 RX ORDER — GABAPENTIN 300 MG/1
300 CAPSULE ORAL 3 TIMES DAILY
Qty: 270 CAPSULE | Refills: 3 | Status: SHIPPED | OUTPATIENT
Start: 2024-01-25

## 2024-01-25 RX ORDER — METFORMIN HYDROCHLORIDE 750 MG/1
750 TABLET, EXTENDED RELEASE ORAL
Qty: 90 TABLET | Refills: 1 | Status: SHIPPED | OUTPATIENT
Start: 2024-01-25

## 2024-01-25 RX ORDER — MELATONIN
1000 DAILY
COMMUNITY

## 2024-01-25 RX ORDER — ATORVASTATIN CALCIUM 20 MG/1
20 TABLET, FILM COATED ORAL
Qty: 90 TABLET | Refills: 1 | Status: SHIPPED | OUTPATIENT
Start: 2024-01-25

## 2024-01-25 RX ORDER — AMLODIPINE BESYLATE 2.5 MG/1
5 TABLET ORAL DAILY
Qty: 180 TABLET | Refills: 1 | Status: SHIPPED | OUTPATIENT
Start: 2024-01-25

## 2024-01-25 NOTE — ASSESSMENT & PLAN NOTE
Lab Results   Component Value Date    HGBA1C 8.4 (A) 01/25/2024   Current HA1C: Increased, discussed diet, she has been drinking 2.5 glasses of chocolate milk a day, discussed tapering it down to one glass a day which she will do.      Last HA1C: 7.4    Last GFR: utd    Urine Microalbumin: UTD, due 10/2024    Last Foot Exam: Done today    Neuropathy symptoms: None    Eye Exam: UTD

## 2024-01-25 NOTE — PROGRESS NOTES
Name: Kimberly Kowalski      : 1950      MRN: [id number]  Encounter Provider: JENI Davis  Encounter Date: 2024   [facility]    Assessment & Plan     1. Type 2 diabetes mellitus with hyperglycemia, without long-term current use of insulin (Formerly [facility])  Assessment & Plan:    Lab Results   Component Value Date    HGBA1C 8.4 (A) 2024   Current HA1C: Increased, discussed diet, she has been drinking 2.5 glasses of chocolate milk a day, discussed tapering it down to one glass a day which she will do.      Last HA1C: 7.4    Last GFR: utd    Urine Microalbumin: UTD, due 10/2024    Last Foot Exam: Done today    Neuropathy symptoms: None    Eye Exam: UTD      Orders:  -     POCT hemoglobin A1c  -     metFORMIN (GLUCOPHAGE-XR) 750 mg 24 hr tablet; Take 1 tablet (750 mg total) by mouth daily with breakfast    2. Sjogren's syndrome, with unspecified organ involvement (Formerly [facility] [place])  Assessment & Plan:  Stable.        3. Mild episode of recurrent major depressive disorder (Formerly [facility])    4. Other hyperlipidemia  -     atorvastatin (LIPITOR) 20 mg tablet; Take 1 tablet (20 mg total) by mouth daily at bedtime    5. History of rheumatoid arthritis    6. Stage 3 chronic kidney disease, unspecified whether stage 3a or 3b CKD (Formerly [facility] [place])    7. Primary hypertension  Assessment & Plan:  BP in       8. Seizure disorder (Formerly [facility] [place])  Assessment & Plan:  Followed by neurology - they are weaning her off of her Keppra at this time.        9. 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Formerly [facility])  Assessment & Plan:  Stable, no changes.       10. Essential hypertension  -     amLODIPine (NORVASC) 2.5 mg tablet; Take 2 tablets (5 mg total) by mouth daily    11. Post herpetic neuralgia  Assessment & Plan:  Continues with gabapentin at bedtime.      Orders:  -     gabapentin (NEURONTIN) 300 mg capsule; Take 1 capsule (300 mg total) by mouth 3 (three) times a day    12. Sicca syndrome (Formerly [facility])  Assessment & Plan:  Stable.        13.  Primary osteoarthritis of left knee  Assessment & Plan:  Continue with copper sleeve of the left knee.               Subjective      Here today for a follow-up.  She is with a hx of T2DM, seizures, HTN.  Reports no new concerns or issues.    Is followed by neurology for her seizures and hx of ganglioma of the brain.  They are working on weaning her off of her seizure medication at his time.      Was seen more recently for left knee.  Xray showing mild degenerative changes.  She has been wearing a copper sleeve support over her left knee and has noted a lot of improvement in mobility and pain.        Review of Systems   Constitutional: Negative.    HENT: Negative.     Respiratory: Negative.     Cardiovascular: Negative.    Gastrointestinal: Negative.    Neurological: Negative.    All other systems reviewed and are negative.      Current Outpatient Medications on File Prior to Visit   Medication Sig   • ASPIRIN 81 PO Take 81 mg by mouth daily   • calcium citrate-vitamin D 315 mg-5 mcg tablet Take 1 tablet by mouth 2 (two) times a day   • cholecalciferol (VITAMIN D3) 1,000 units tablet Take 1,000 Units by mouth daily   • escitalopram (LEXAPRO) 20 mg tablet    • levETIRAcetam (KEPPRA) 750 mg tablet Take 750 mg by mouth 2 (two) times a day 1.2 pill in am, 1 pill in pm   • Multiple Vitamins-Minerals (MULTIVITAMIN WITH MINERALS) tablet Take 1 tablet by mouth daily   • topiramate (TOPAMAX) 100 mg tablet Take 150 mg by mouth 2 (two) times a day     • traZODone (DESYREL) 100 mg tablet Take 1 tablet (100 mg total) by mouth daily at bedtime   • venlafaxine (EFFEXOR-XR) 75 mg 24 hr capsule Take 1 capsule (75 mg total) by mouth daily   • [DISCONTINUED] amLODIPine (NORVASC) 2.5 mg tablet Take 2 tablets (5 mg total) by mouth daily   • [DISCONTINUED] atorvastatin (LIPITOR) 20 mg tablet Take 1 tablet (20 mg total) by mouth daily at bedtime   • [DISCONTINUED] gabapentin (NEURONTIN) 300 mg capsule TAKE 1 CAPSULE THREE TIMES A DAY   •  "[DISCONTINUED] metFORMIN (GLUCOPHAGE-XR) 750 mg 24 hr tablet Take 1 tablet (750 mg total) by mouth daily with breakfast   • lansoprazole (PREVACID) 15 mg capsule Take 1 capsule (15 mg total) by mouth daily   • [DISCONTINUED] acetaminophen-codeine (TYLENOL with CODEINE #3) 300-30 MG per tablet Take 1 tablet by mouth every 4 to 6 hours as needed for pain (Patient not taking: Reported on 12/15/2023)   • [DISCONTINUED] amoxicillin (AMOXIL) 500 MG tablet Take 500 mg by mouth 3 (three) times a day (Patient not taking: Reported on 12/15/2023)   • [DISCONTINUED] amoxicillin-clavulanate (AUGMENTIN) 500-125 mg per tablet TAKE 1 TABLET BY MOUTH TWICE DAILY UNTIL GONE (Patient not taking: Reported on 12/15/2023)   • [DISCONTINUED] calcium carbonate (TUMS) 500 mg chewable tablet Chew 1 tablet daily (Patient not taking: Reported on 1/4/2024)   • [DISCONTINUED] chlorhexidine (PERIDEX) 0.12 % solution RINSE WITH 15ML FOR 30 SECONDS AND SPIT, USE TWICE DAILY AFTER BRUSHING AND FLOSSING . (Patient not taking: Reported on 1/4/2024)       Objective     /82 (BP Location: Right arm, Patient Position: Sitting, Cuff Size: Large)   Pulse 67   Temp 97.8 °F (36.6 °C)   Ht 5' 4\" (1.626 m)   Wt 87.8 kg (193 lb 9.6 oz)   SpO2 97%   BMI 33.23 kg/m²     Physical Exam  Constitutional:       Appearance: Normal appearance.   Cardiovascular:      Rate and Rhythm: Normal rate and regular rhythm.   Pulmonary:      Effort: Pulmonary effort is normal.      Breath sounds: Normal breath sounds.   Neurological:      Mental Status: She is alert.   Psychiatric:         Mood and Affect: Mood normal.         Behavior: Behavior normal.         Thought Content: Thought content normal.         Judgment: Judgment normal.       JENI Davis    "

## 2024-03-19 ENCOUNTER — OFFICE VISIT (OUTPATIENT)
Dept: OBGYN CLINIC | Facility: CLINIC | Age: 74
End: 2024-03-19
Payer: MEDICARE

## 2024-03-19 ENCOUNTER — HOSPITAL ENCOUNTER (OUTPATIENT)
Dept: RADIOLOGY | Facility: CLINIC | Age: 74
Discharge: HOME | End: 2024-03-19
Payer: MEDICARE

## 2024-03-19 VITALS
BODY MASS INDEX: 32.95 KG/M2 | DIASTOLIC BLOOD PRESSURE: 78 MMHG | TEMPERATURE: 97.6 F | WEIGHT: 193 LBS | HEART RATE: 78 BPM | SYSTOLIC BLOOD PRESSURE: 120 MMHG | HEIGHT: 64 IN

## 2024-03-19 DIAGNOSIS — M17.0 PRIMARY OSTEOARTHRITIS OF BOTH KNEES: ICD-10-CM

## 2024-03-19 DIAGNOSIS — M25.561 ACUTE PAIN OF BOTH KNEES: Primary | ICD-10-CM

## 2024-03-19 DIAGNOSIS — M25.561 ACUTE PAIN OF BOTH KNEES: ICD-10-CM

## 2024-03-19 DIAGNOSIS — M25.562 ACUTE PAIN OF BOTH KNEES: ICD-10-CM

## 2024-03-19 DIAGNOSIS — M25.562 ACUTE PAIN OF BOTH KNEES: Primary | ICD-10-CM

## 2024-03-19 PROCEDURE — 73562 X-RAY EXAM OF KNEE 3: CPT

## 2024-03-19 PROCEDURE — 99213 OFFICE O/P EST LOW 20 MIN: CPT | Performed by: STUDENT IN AN ORGANIZED HEALTH CARE EDUCATION/TRAINING PROGRAM

## 2024-03-19 PROCEDURE — 20610 DRAIN/INJ JOINT/BURSA W/O US: CPT | Performed by: STUDENT IN AN ORGANIZED HEALTH CARE EDUCATION/TRAINING PROGRAM

## 2024-03-19 RX ORDER — BUPIVACAINE HYDROCHLORIDE 2.5 MG/ML
2 INJECTION, SOLUTION INFILTRATION; PERINEURAL
Status: COMPLETED | OUTPATIENT
Start: 2024-03-19 | End: 2024-03-19

## 2024-03-19 RX ORDER — TRIAMCINOLONE ACETONIDE 40 MG/ML
40 INJECTION, SUSPENSION INTRA-ARTICULAR; INTRAMUSCULAR
Status: COMPLETED | OUTPATIENT
Start: 2024-03-19 | End: 2024-03-19

## 2024-03-19 RX ADMIN — BUPIVACAINE HYDROCHLORIDE 2 ML: 2.5 INJECTION, SOLUTION INFILTRATION; PERINEURAL at 10:30

## 2024-03-19 RX ADMIN — TRIAMCINOLONE ACETONIDE 40 MG: 40 INJECTION, SUSPENSION INTRA-ARTICULAR; INTRAMUSCULAR at 10:30

## 2024-03-19 NOTE — PROGRESS NOTES
1. Acute pain of both knees  XR knee 3 vw right non injury    Ambulatory Referral to Physical Therapy    Large joint arthrocentesis: bilateral knee      2. Primary osteoarthritis of both knees  XR knee 3 vw right non injury    Ambulatory Referral to Physical Therapy    Large joint arthrocentesis: bilateral knee          Orders Placed This Encounter   Procedures    Large joint arthrocentesis: bilateral knee    XR knee 3 vw right non injury    Ambulatory Referral to Physical Therapy        Imaging Studies (I personally reviewed images in PACS and report):    X-ray right knee 3/19/2024: Moderate patellofemoral degenerative changes, medial tibiofemoral degenerative changes.  No acute osseous abnormalities.  No joint effusion  X-ray left knee 12/8/2023: Mild tricompartmental osteoarthritic changes.  No acute osseous abnormalities.  No joint effusion.    IMPRESSION:  Acute atraumatic bilateral knee pain, left worse than right  Gait difficulty especially with ascending stairs  Primary osteoarthritis of the left knee, patellofemoral pain    Other factors:  BMI 33  CKD 3  Type 2 diabetes  Reported history of rheumatoid arthritis  Sjogren's syndrome    PLAN:    Clinical exam and radiographic imaging reviewed with patient today, with impression as per above. I have discussed with the patient the pathophysiology of this diagnosis and reviewed how the examination correlates with this diagnosis.    Prior imaging of her left knee reviewed.  I placed a new order for today as noted above.  I will follow-up official radiology interpretation.  Treatment options were discussed at length, including risks and benefits; after discussing these treatment options, the patient elected to undergo bilateral intra-articular knee cortisone injections as per procedure note below given her limited response to conservative treatments to Tylenol and compression sleeve use.  Counseled we can repeat the injection every 3 to 4 months as needed in  "regards to pain control.  Alternatively, I could consider her for a viscosupplementation injection if cortisone does not provide sufficient relief.  Furthermore, I did offer referral to formal physical therapy to work on strengthening of her lower extremities as her sense of instability may be due to lower extremity deconditioning or weakness secondary to the pain from her osteoarthritis.  Patient agreeable to referral and this was placed in her chart.  Counseled she can still use acetaminophen, topical NSAIDs, heat/ice therapy while waiting for cortisone to take effect.    Return in about 3 months (around 6/19/2024).    Portions of the record may have been created with voice recognition software. Occasional wrong word or \"sound a like\" substitutions may have occurred due to the inherent limitations of voice recognition software. Read the chart carefully and recognize, using context, where substitutions have occurred.     CHIEF COMPLAINT:  Chief Complaint   Patient presents with    Follow-up         HPI:  Kimberly Kowalski is a 73 y.o. female  who presents with her significant other for      Visit 3/19/2024:  Follow-up evaluation of left knee pain/osteoarthritis  Patient last seen on 12/15/2023 in which patient deferred an intra-articular cortisone injection of her knee in favor of more conservative treatments.  Patient reports today that she is actually experiencing pain of her bilateral knees, left worse than right  Ongoing issue for the past month without a precipitating injury  Describes pain as sharp/aching and of moderate to severe intensity.  She states it is worse with prolonged walking, standing and especially when ascending stairs.  She states she has a sense of instability while walking upstairs due to pain and fatigue of her legs.  She reports swelling of her knees as well as stiffness of her knees towards the end of the day.  She denies any discoloration of her knees, N/T.  In regards to pain control she has " been using Tylenol as well as using compression sleeves for her knees during activities which she feels provides mild relief.  She states this pain does not wake her up at night.  She reports crepitus of her knees with range of motion movements.    She notes today that she would be more open to an intra-articular cortisone injection of her knee if warranted.            Medical, Surgical, Family, and Social History    Past Medical History:   Diagnosis Date    Cervicalgia     Depression     Diabetes mellitus (HCC)     Esophageal reflux     Hyperlipidemia     Hypertension     Essential    Migraine     Nonspecific abnormal electrocardiogram (ECG)     Sciatica     Seizure disorder (HCC)     Sjogren's syndrome (HCC)      Past Surgical History:   Procedure Laterality Date    CERVICAL POLYPECTOMY      CRANIOTOMY       Left frontal craniotomy for resection of tumor-Dr. Jose Luis [facility]     TUBAL LIGATION       Social History   Social History     Substance and Sexual Activity   Alcohol Use Never     Social History     Substance and Sexual Activity   Drug Use Never     Social History     Tobacco Use   Smoking Status Never    Passive exposure: Never   Smokeless Tobacco Never     Family History   Problem Relation Age of Onset    Aneurysm Mother     Heart disease Father     Lupus Sister     Thyroid cancer Sister         age unknown    No Known Problems Sister     No Known Problems Sister     Diabetes Maternal Grandmother     No Known Problems Maternal Grandfather     No Known Problems Paternal Grandmother     No Known Problems Paternal Grandfather     Heart disease Brother     Diabetes Maternal Aunt     No Known Problems Maternal Aunt     No Known Problems Maternal Aunt     No Known Problems Paternal Aunt     Breast cancer Neg Hx     BRCA2 Positive Neg Hx     BRCA1 Positive Neg Hx     Endometrial cancer Neg Hx     Colon cancer Neg Hx     Ovarian cancer Neg Hx     BRCA1 Negative Neg Hx     BRCA2 Negative Neg Hx   "   Breast cancer additional onset Neg Hx     BRCA 1/2 Neg Hx      Allergies   Allergen Reactions    Lamotrigine      Other reaction(s): LAMOTRIGINE (LAMICTAL) rash          Physical Exam  /78   Pulse 78   Temp 97.6 °F (36.4 °C) (Temporal)   Ht 5' 4\" (1.626 m)   Wt 87.5 kg (193 lb)   BMI 33.13 kg/m²     Constitutional:  see vital signs  Gen: obese, normocephalic/atraumatic, well-groomed  Eyes: No inflammation or discharge of conjunctiva or lids; sclera clear   Pulmonary/Chest: Effort normal. No respiratory distress.     Ortho Exam  Left knee Exam:  Erythema: no  Swelling: no  Increased Warmth: no  Tenderness: +mild over medial patellofemoral joint line, +MJL  ROM: 0-130  Knee flexion strength: 5/5  Knee extension strength: 5/5  Patellar Grind: +  Lachman's: negative  Varus laxity: negative  Valgus laxity: negative  [facility]: negative     Right knee Exam:  Erythema: no  Swelling: no  Increased Warmth: no  Tenderness: +MJL, superomedial aspect of patella  ROM: 0-130  Knee flexion strength: 5/5  Knee extension strength: 5/5  Patellar Grind: +  Lachman's: negative  Varus laxity: negative  Valgus laxity: negative  Nicolette: negative     No calf tenderness to palpation b/l    Large joint arthrocentesis: bilateral knee  Universal Protocol:  Consent: Verbal consent obtained.  Risks and benefits: risks, benefits and alternatives were discussed  Consent given by: patient  Patient understanding: patient states understanding of the procedure being performed  Site marked: the operative site was marked  Radiology Images displayed and confirmed. If images not available, report reviewed: imaging studies available  Patient identity confirmed: verbally with patient  Supporting Documentation  Indications: pain   Procedure Details  Location: knee - bilateral knee  Preparation: Patient was prepped and draped in the usual sterile fashion  Needle size: 22 G  Ultrasound guidance: no  Approach: anterolateral    Medications " (Right): 2 mL bupivacaine 0.25 %; 40 mg triamcinolone acetonide 40 mg/mLMedications (Left): 2 mL bupivacaine 0.25 %; 40 mg triamcinolone acetonide 40 mg/mL   Patient tolerance: patient tolerated the procedure well with no immediate complications  Dressing:  Sterile dressing applied

## 2024-03-26 ENCOUNTER — EVALUATION (OUTPATIENT)
Dept: PHYSICAL THERAPY | Facility: CLINIC | Age: 74
End: 2024-03-26
Payer: MEDICARE

## 2024-03-26 DIAGNOSIS — M17.0 PRIMARY OSTEOARTHRITIS OF BOTH KNEES: ICD-10-CM

## 2024-03-26 DIAGNOSIS — M25.562 ACUTE PAIN OF BOTH KNEES: ICD-10-CM

## 2024-03-26 DIAGNOSIS — M25.561 ACUTE PAIN OF BOTH KNEES: ICD-10-CM

## 2024-03-26 PROCEDURE — 97161 PT EVAL LOW COMPLEX 20 MIN: CPT | Performed by: PHYSICAL THERAPIST

## 2024-03-26 PROCEDURE — 97110 THERAPEUTIC EXERCISES: CPT | Performed by: PHYSICAL THERAPIST

## 2024-03-26 NOTE — PROGRESS NOTES
PT Evaluation     Today's date: 3/26/2024  Patient name: Kimberly Kowalski  : 1950  MRN: [id number]  Referring provider: Jose L Jacobson MD  Dx:   Encounter Diagnosis     ICD-10-CM    1. Acute pain of both knees  M25.561 Ambulatory Referral to Physical Therapy    M25.562       2. Primary osteoarthritis of both knees  M17.0 Ambulatory Referral to Physical Therapy                     Assessment  Assessment details: Pt presents to PT with signs and symptoms of arthritic knee pain. Patello-femoral component seems main . Functionally has pain with walking at times, difficulty going up stairs and pain descending stairs. Objectively she has mild fluid in both knees, missing terminal R knee ext slightly, and would benefit from proximal strength. Skilled PT warranted to address findings and progress towards outlined goals. Pt in agreement with current POC.      Symptom irritability: moderateUnderstanding of Dx/Px/POC: good   Prognosis: fair    Goals  St weeks  Independent with HEP focused on proximal LE and trunk strength  Independent with knee ext mobility work  Reduce pain with descending stairs by 50%  Minimal to no pain with transferring out of chair    LTG: independent with HEP    Plan  Plan details: TE, NMR, TA, MT, self education, and modalities as needed in order to progress through skilled PT focused on  ROM, strength, balance, coordination       Patient would benefit from: skilled physical therapy  Planned modality interventions: cryotherapy and thermotherapy: hydrocollator packs  Planned therapy interventions: manual therapy, neuromuscular re-education, self care, therapeutic activities, therapeutic exercise and home exercise program  Frequency: 2x week  Duration in weeks: 6  Plan of Care beginning date: 3/26/2024  Plan of Care expiration date: 2024  Treatment plan discussed with: patient        Subjective Evaluation    History of Present Illness  Mechanism of injury: 73 year old female  "presenting to PT with chronic bilateral knee pain. L is worse than R. Has difficulty going down stairs with left, pain with walking, getting out of chair. R knee is not very painful but is difficulty to lead with going up stairs.     Denies any prior hx of surgery/injury to the knees or hips.     2 weeks ago got injections in both knees which has helped reduce pain but still present.   Patient Goals  Patient goals for therapy: decreased pain, increased motion, increased strength, improved balance, independence with ADLs/IADLs and return to sport/leisure activities  Patient goal: go up the stairs with less pain  Pain  At best pain ratin  At worst pain ratin (since the injections)    Treatments  Current treatment: injection treatment        Objective  Gait  normal      ROM  R 3-125  L 0-125      Passive motion  Terminal knee ext: slight stiffness R compared to L      Strength R/L  Hip flex: 4-/4  Hip abd: 4/3+  Knee ext: 4+/4+  Knee flex: 4/4        Hip screen  Wfl, pain free      Circumference/girth  Mild swelling b/l - slightly more R    Palpation: tender through L patella     Precautions:     Daily Treatment Diary:      Initial Evaluation Date: 24  POC Expiration:   Compliance 24                     Visit Number 1                    Re-Eval  IE                 MC   Foto Captured Y                              Manuals 3/26        Joint work         -hip         -knee         ST work         Flexibility/PROM                           Neuro Re-Ed         balance         SLS nv        Tandem AE nv        Step-ups fwd         Step-ups lat                  lunge         Ther Ex         NuStep/bike nv        Hamstring stretch Strap 3x30\" ea                 SLR program Flex 2x12        clamshells NV        bridges 2x10        Standing hip abd 2x10        Standing march NV        Standing HR NV        Hamstring strength         Quad strength         education Provided written HEP and reviewed      "   Ther Activity         Stair negotiation                  Gait Training                           Modalities                             Access Code: [email or web address]  URL: [email or web address]  Date: 03/26/2024  Prepared by: Jaziel Peralta    Exercises  - Supine Bridge  - 1 x daily - 7 x weekly - 3 sets - 10 reps  - Supine Active Straight Leg Raise  - 1 x daily - 7 x weekly - 3 sets - 10 reps  - Standing Hip Abduction with Counter Support  - 1 x daily - 7 x weekly - 3 sets - 10 reps  - Supine Hamstring Stretch with Strap  - 1 x daily - 7 x weekly - 3 sets - 10 reps

## 2024-04-01 ENCOUNTER — OFFICE VISIT (OUTPATIENT)
Dept: PHYSICAL THERAPY | Facility: CLINIC | Age: 74
End: 2024-04-01
Payer: MEDICARE

## 2024-04-01 DIAGNOSIS — M25.561 ACUTE PAIN OF BOTH KNEES: Primary | ICD-10-CM

## 2024-04-01 DIAGNOSIS — M17.0 PRIMARY OSTEOARTHRITIS OF BOTH KNEES: ICD-10-CM

## 2024-04-01 DIAGNOSIS — M25.562 ACUTE PAIN OF BOTH KNEES: Primary | ICD-10-CM

## 2024-04-01 PROCEDURE — 97112 NEUROMUSCULAR REEDUCATION: CPT | Performed by: PHYSICAL THERAPIST

## 2024-04-01 PROCEDURE — 97110 THERAPEUTIC EXERCISES: CPT | Performed by: PHYSICAL THERAPIST

## 2024-04-01 NOTE — PROGRESS NOTES
"Daily Note     Today's date: 2024  Patient name: Kimberly Kowalski  : 1950  MRN: [id number]  Referring provider: Jose L Jacobson MD  Dx:   Encounter Diagnosis     ICD-10-CM    1. Acute pain of both knees  M25.561     M25.562       2. Primary osteoarthritis of both knees  M17.0                      Subjective: no changes noted      Objective: See treatment diary below      Assessment: patient required cueing for proper completion of SLR as she had notable lag - was able to complete correctly once done. Tolerated treatment well. Patient would benefit from continued PT      Plan: Continue per plan of care.      Precautions:     Daily Treatment Diary:      Initial Evaluation Date: 24  POC Expiration:   Compliance 24                   Visit Number 1 2                   Re-Eval  IE                 MC   Foto Captured Y                              Manuals 3/26 4/1       Joint work         -hip         -knee         ST work         Flexibility/PROM                           Neuro Re-Ed         balance         SLS nv 8x10\" ea w/ instruction       Tandem AE nv Static and W/ walk        Step-ups fwd         Step-ups lat                  lunge         Ther Ex         NuStep/bike nv Srb 5'       Hamstring stretch Strap 3x30\" ea Manual 4x30\" ea                SLR program Flex 2x12 Flex 3x10 ea       clamshells NV S/l 3x10 gtb       bridges 2x10 2x10       Standing hip abd 2x10 2x10       Standing march NV nv       Standing HR NV 2x12       Hamstring strength         Quad strength         education Provided written HEP and reviewed        Ther Activity         Stair negotiation                  Gait Training                           Modalities                             Access Code: [email or web address]  URL: [email or web address]  Date: 2024  Prepared by: Jaziel Peralta    Exercises  - Supine Bridge  - 1 x daily - 7 x weekly - 3 sets - 10 reps  - Supine Active Straight Leg Raise  - 1 x daily " - 7 x weekly - 3 sets - 10 reps  - Standing Hip Abduction with Counter Support  - 1 x daily - 7 x weekly - 3 sets - 10 reps  - Supine Hamstring Stretch with Strap  - 1 x daily - 7 x weekly - 3 sets - 10 reps

## 2024-04-03 ENCOUNTER — OFFICE VISIT (OUTPATIENT)
Dept: PHYSICAL THERAPY | Facility: CLINIC | Age: 74
End: 2024-04-03
Payer: MEDICARE

## 2024-04-03 DIAGNOSIS — M25.561 ACUTE PAIN OF BOTH KNEES: Primary | ICD-10-CM

## 2024-04-03 DIAGNOSIS — M17.0 PRIMARY OSTEOARTHRITIS OF BOTH KNEES: ICD-10-CM

## 2024-04-03 DIAGNOSIS — M25.562 ACUTE PAIN OF BOTH KNEES: Primary | ICD-10-CM

## 2024-04-03 PROCEDURE — 97110 THERAPEUTIC EXERCISES: CPT

## 2024-04-03 PROCEDURE — 97112 NEUROMUSCULAR REEDUCATION: CPT

## 2024-04-03 NOTE — PROGRESS NOTES
"Daily Note     Today's date: 4/3/2024  Patient name: Kimberly Kowalski  : 1950  MRN: [id number]  Referring provider: Jose L Jacobson MD  Dx:   Encounter Diagnosis     ICD-10-CM    1. Acute pain of both knees  M25.561     M25.562       2. Primary osteoarthritis of both knees  M17.0                      Subjective: had some buttock soreness, felt more like muscle soreness      Objective: See treatment diary below      Assessment: Kimberly Kowalski tolerated treatmetn well. Appears appropriately challenged by exercises. Continued treatment should benefit.       Plan: Continue per plan of care.      Precautions:     Daily Treatment Diary:      Initial Evaluation Date: 24  POC Expiration:   Compliance 03/26/24  4/1  4/3                 Visit Number 1 2  3                 Re-Eval  IE                 MARISA   Foto Captured Y                              Manuals 3/26 4/1 4/3      Joint work         -hip         -knee         ST work         Flexibility/PROM                           Neuro Re-Ed         balance                  SLS nv 8x10\" ea w/ instruction 8x10\" ea w/ instruction      Tandem AE nv Static and W/ walk  Static and W/ walk       Step-ups fwd         Step-ups lat                  lunge         Ther Ex         NuStep/bike nv Srb 5' Srb 5'      Hamstring stretch Strap 3x30\" ea Manual 4x30\" ea Manual 4x30\" ea               SLR program Flex 2x12 Flex 3x10 ea Flex 3x10 1#      clamshells NV S/l 3x10 gtb S/l 3x10 gtb      bridges 2x10 2x10 2x10      Standing hip abd 2x10 2x10 3x10      Standing march NV nv x20      Standing HR NV 2x12 2x12      Hamstring strength         Quad strength         education Provided written HEP and reviewed        Ther Activity         Stair negotiation                  Gait Training                           Modalities                             Access Code: [email or web address]  URL: [email or web address]  Date: 2024  Prepared by: Jaziel Peralta    Exercises  - Supine " Bridge  - 1 x daily - 7 x weekly - 3 sets - 10 reps  - Supine Active Straight Leg Raise  - 1 x daily - 7 x weekly - 3 sets - 10 reps  - Standing Hip Abduction with Counter Support  - 1 x daily - 7 x weekly - 3 sets - 10 reps  - Supine Hamstring Stretch with Strap  - 1 x daily - 7 x weekly - 3 sets - 10 reps

## 2024-04-08 ENCOUNTER — OFFICE VISIT (OUTPATIENT)
Dept: PHYSICAL THERAPY | Facility: CLINIC | Age: 74
End: 2024-04-08
Payer: MEDICARE

## 2024-04-08 DIAGNOSIS — M25.561 ACUTE PAIN OF BOTH KNEES: Primary | ICD-10-CM

## 2024-04-08 DIAGNOSIS — M17.0 PRIMARY OSTEOARTHRITIS OF BOTH KNEES: ICD-10-CM

## 2024-04-08 DIAGNOSIS — M25.562 ACUTE PAIN OF BOTH KNEES: Primary | ICD-10-CM

## 2024-04-08 PROCEDURE — 97140 MANUAL THERAPY 1/> REGIONS: CPT | Performed by: PHYSICAL THERAPIST

## 2024-04-08 PROCEDURE — 97112 NEUROMUSCULAR REEDUCATION: CPT | Performed by: PHYSICAL THERAPIST

## 2024-04-08 PROCEDURE — 97110 THERAPEUTIC EXERCISES: CPT | Performed by: PHYSICAL THERAPIST

## 2024-04-08 NOTE — PROGRESS NOTES
"Daily Note     Today's date: 2024  Patient name: Kimberly Kowalski  : 1950  MRN: [id number]  Referring provider: Jose L Jacobson MD  Dx:   Encounter Diagnosis     ICD-10-CM    1. Acute pain of both knees  M25.561     M25.562       2. Primary osteoarthritis of both knees  M17.0                      Subjective: did a lot of yard work this weekend. Felt tired by end of day and had left knee soreness.      Objective: See treatment diary below      Assessment: small amount of swelling medially. Worked retrograde and ST through hamstring distally. Required cueing for proper standing hip abd to encourage more focus on stance leg and avoiding compensation. Able to complete properly after education.  Tolerated treatment well. Patient would benefit from continued PT      Plan: Continue per plan of care.      Precautions:     Daily Treatment Diary:      Initial Evaluation Date: 24  POC Expiration:   Compliance 03/26/24  4/1  4/3  4/8               Visit Number 1 2  3  4               Re-Eval  IE                    Foto Captured Y                              Manuals 3/26 4/1 4/3 4/8     Joint work         -hip         -knee         ST work    Retrograde and ST through medial knee/hamstring     Flexibility/PROM                           Neuro Re-Ed         balance                  SLS nv 8x10\" ea w/ instruction 8x10\" ea w/ instruction 5x10\" ea     Tandem AE nv Static and W/ walk  Static and W/ walk  Floor static 3x20\" ea, AE static 3x20\" ea     Step-ups fwd         Step-ups lat                  lunge         Ther Ex         NuStep/bike nv Srb 5' Srb 5' Srb 6'     Hamstring stretch Strap 3x30\" ea Manual 4x30\" ea Manual 4x30\" ea -              SLR program Flex 2x12 Flex 3x10 ea Flex 3x10 1# Flex 3x10 1# ea     clamshells NV S/l 3x10 gtb S/l 3x10 gtb -     bridges 2x10 2x10 2x10 2x10     Standing hip abd 2x10 2x10 3x10 2x15 1# ea     Standing march NV nv x20      Standing HR NV 2x12 2x12 2x15     Hamstring " strength         Sit-stand    W/ blue med ball press for trunk work 3x5     education Provided written HEP and reviewed        Ther Activity         Stair negotiation                  Gait Training                           Modalities                             Access Code: [email or web address]  URL: [email or web address]  Date: 03/26/2024  Prepared by: Jaziel Peralta    Exercises  - Supine Bridge  - 1 x daily - 7 x weekly - 3 sets - 10 reps  - Supine Active Straight Leg Raise  - 1 x daily - 7 x weekly - 3 sets - 10 reps  - Standing Hip Abduction with Counter Support  - 1 x daily - 7 x weekly - 3 sets - 10 reps  - Supine Hamstring Stretch with Strap  - 1 x daily - 7 x weekly - 3 sets - 10 reps

## 2024-04-10 ENCOUNTER — OFFICE VISIT (OUTPATIENT)
Dept: PHYSICAL THERAPY | Facility: CLINIC | Age: 74
End: 2024-04-10
Payer: MEDICARE

## 2024-04-10 DIAGNOSIS — M25.562 ACUTE PAIN OF BOTH KNEES: Primary | ICD-10-CM

## 2024-04-10 DIAGNOSIS — M17.0 PRIMARY OSTEOARTHRITIS OF BOTH KNEES: ICD-10-CM

## 2024-04-10 DIAGNOSIS — M25.561 ACUTE PAIN OF BOTH KNEES: Primary | ICD-10-CM

## 2024-04-10 PROCEDURE — 97140 MANUAL THERAPY 1/> REGIONS: CPT | Performed by: PHYSICAL THERAPIST

## 2024-04-10 PROCEDURE — 97110 THERAPEUTIC EXERCISES: CPT | Performed by: PHYSICAL THERAPIST

## 2024-04-10 NOTE — PROGRESS NOTES
"Daily Note     Today's date: 4/10/2024  Patient name: Kimberly Kowalski  : 1950  MRN: [id number]  Referring provider: Jose L Jacobson MD  Dx:   Encounter Diagnosis     ICD-10-CM    1. Acute pain of both knees  M25.561     M25.562       2. Primary osteoarthritis of both knees  M17.0                      Subjective: left knee is sore medially and on top. Was outside doing yard work all day yesterday      Objective: See treatment diary below      Assessment: some swelling remains medial knee. Cueing required to prevent lag for SLR. Tolerated treatment well. Patient would benefit from continued PT      Plan: Continue per plan of care.      Precautions:     Daily Treatment Diary:      Initial Evaluation Date: 24  POC Expiration:   Compliance 03/26/24  4/1  4/3  4/8  4/10             Visit Number 1 2  3  4  5             Re-Eval  IE                 MC   Foto Captured Y                              Manuals 3/26 4/1 4/3 4/8 4/10    Joint work         -hip         -knee         ST work    Retrograde and ST through medial knee/hamstring Retrograde and ST thru medial knee/ham    Flexibility/PROM                           Neuro Re-Ed         balance                  SLS nv 8x10\" ea w/ instruction 8x10\" ea w/ instruction 5x10\" ea     Tandem AE nv Static and W/ walk  Static and W/ walk  Floor static 3x20\" ea, AE static 3x20\" ea 4x12\" ea, NBOS AE w/ reaching on demand    Step-ups fwd         Step-ups lat                  lunge         Ther Ex         NuStep/bike nv Srb 5' Srb 5' Srb 6' NS 10'     Hamstring stretch Strap 3x30\" ea Manual 4x30\" ea Manual 4x30\" ea -              SLR program Flex 2x12 Flex 3x10 ea Flex 3x10 1# Flex 3x10 1# ea Flex 3x10 1# ea    clamshells NV S/l 3x10 gtb S/l 3x10 gtb - Isometric hookying gtb 2x15 ea    bridges 2x10 2x10 2x10 2x10 2x10    Standing hip abd 2x10 2x10 3x10 2x15 1# ea 2x15 1#    Standing march NV nv x20      Standing HR NV 2x12 2x12 2x15 2x15    Hamstring strength       "   Sit-stand    W/ blue med ball press for trunk work 3x5     education Provided written HEP and reviewed        Ther Activity         Stair negotiation                  Gait Training                           Modalities                             Access Code: [email or web address]  URL: [email or web address]  Date: 03/26/2024  Prepared by: Jaziel Peralta    Exercises  - Supine Bridge  - 1 x daily - 7 x weekly - 3 sets - 10 reps  - Supine Active Straight Leg Raise  - 1 x daily - 7 x weekly - 3 sets - 10 reps  - Standing Hip Abduction with Counter Support  - 1 x daily - 7 x weekly - 3 sets - 10 reps  - Supine Hamstring Stretch with Strap  - 1 x daily - 7 x weekly - 3 sets - 10 reps

## 2024-04-15 ENCOUNTER — OFFICE VISIT (OUTPATIENT)
Dept: PHYSICAL THERAPY | Facility: CLINIC | Age: 74
End: 2024-04-15
Payer: MEDICARE

## 2024-04-15 DIAGNOSIS — M25.562 ACUTE PAIN OF BOTH KNEES: Primary | ICD-10-CM

## 2024-04-15 DIAGNOSIS — M17.0 PRIMARY OSTEOARTHRITIS OF BOTH KNEES: ICD-10-CM

## 2024-04-15 DIAGNOSIS — M25.561 ACUTE PAIN OF BOTH KNEES: Primary | ICD-10-CM

## 2024-04-15 PROCEDURE — 97140 MANUAL THERAPY 1/> REGIONS: CPT

## 2024-04-15 PROCEDURE — 97110 THERAPEUTIC EXERCISES: CPT

## 2024-04-15 NOTE — PROGRESS NOTES
"Daily Note     Today's date: 4/15/2024  Patient name: Kimberly Kowalski  : 1950  MRN: [id number]  Referring provider: Jose L Jacobson MD  Dx:   Encounter Diagnosis     ICD-10-CM    1. Acute pain of both knees  M25.561     M25.562       2. Primary osteoarthritis of both knees  M17.0                      Subjective: Patient reports doing better after each visit.       Objective: See treatment diary below      Assessment: Kimberly Kowalski tolerated treatment well. Challenged by sit to stand exercise. She requires verbal cueing for proper technique of exercises. Continued treatment indicated.       Plan: Continue per plan of care.      Precautions:     Daily Treatment Diary:      Initial Evaluation Date: 24  POC Expiration:   Compliance 03/26/24  4/1  4/3  4/8  4/10  4/15           Visit Number 1 2  3  4  5  6           Re-Eval  IE                 MC   Foto Captured Y                              Manuals 3/26 4/1 4/3 4/8 4/10 4/15   Joint work         -hip         -knee         ST work    Retrograde and ST through medial knee/hamstring Retrograde and ST thru medial knee/ham Retrograde and ST thru medial knee/ham   Flexibility/PROM                           Neuro Re-Ed         balance                  SLS nv 8x10\" ea w/ instruction 8x10\" ea w/ instruction 5x10\" ea  5x10\" ea   Tandem AE nv Static and W/ walk  Static and W/ walk  Floor static 3x20\" ea, AE static 3x20\" ea 4x12\" ea, NBOS AE w/ reaching on demand 4x12\" ea, NBOS AE w/ reaching on demand   Step-ups fwd         Step-ups lat                  lunge         Ther Ex         NuStep/bike nv Srb 5' Srb 5' Srb 6' NS 10'  NS 10'    Hamstring stretch Strap 3x30\" ea Manual 4x30\" ea Manual 4x30\" ea -              SLR program Flex 2x12 Flex 3x10 ea Flex 3x10 1# Flex 3x10 1# ea Flex 3x10 1# ea Flex 3x10 1# ea   clamshells NV S/l 3x10 gtb S/l 3x10 gtb - Isometric hookying gtb 2x15 ea Isometric hookying gtb 2x15 ea   bridges 2x10 2x10 2x10 2x10 2x10    Standing hip " abd 2x10 2x10 3x10 2x15 1# ea 2x15 1# 2x15 1#   Standing march NV nv x20      Standing HR NV 2x12 2x12 2x15 2x15 2x15   Hamstring strength         Sit-stand    W/ blue med ball press for trunk work 3x5  W/ blue med ball press for trunk work 3x5   education Provided written HEP and reviewed        Ther Activity         Stair negotiation                  Gait Training                           Modalities                             Access Code: [email or web address]  URL: [email or web address]  Date: 03/26/2024  Prepared by: Jaziel Peralta    Exercises  - Supine Bridge  - 1 x daily - 7 x weekly - 3 sets - 10 reps  - Supine Active Straight Leg Raise  - 1 x daily - 7 x weekly - 3 sets - 10 reps  - Standing Hip Abduction with Counter Support  - 1 x daily - 7 x weekly - 3 sets - 10 reps  - Supine Hamstring Stretch with Strap  - 1 x daily - 7 x weekly - 3 sets - 10 reps

## 2024-04-17 ENCOUNTER — OFFICE VISIT (OUTPATIENT)
Dept: PHYSICAL THERAPY | Facility: CLINIC | Age: 74
End: 2024-04-17
Payer: MEDICARE

## 2024-04-17 DIAGNOSIS — M17.0 PRIMARY OSTEOARTHRITIS OF BOTH KNEES: ICD-10-CM

## 2024-04-17 DIAGNOSIS — M25.561 ACUTE PAIN OF BOTH KNEES: Primary | ICD-10-CM

## 2024-04-17 DIAGNOSIS — M25.562 ACUTE PAIN OF BOTH KNEES: Primary | ICD-10-CM

## 2024-04-17 PROCEDURE — 97110 THERAPEUTIC EXERCISES: CPT | Performed by: PHYSICAL THERAPIST

## 2024-04-17 PROCEDURE — 97140 MANUAL THERAPY 1/> REGIONS: CPT | Performed by: PHYSICAL THERAPIST

## 2024-04-17 NOTE — PROGRESS NOTES
"Daily Note     Today's date: 2024  Patient name: Kimberly Kowalski  : 1950  MRN: [id number]  Referring provider: Jose L Jacobson MD  Dx:   Encounter Diagnosis     ICD-10-CM    1. Acute pain of both knees  M25.561     M25.562       2. Primary osteoarthritis of both knees  M17.0                      Subjective: no major changes in knee      Objective: See treatment diary below      Assessment: slight stiffness with L hip IR in supine today compared to R. Added hip work with goal of improving mobility and reducing knee pain. Cueing required for proper TE form. Tolerated treatment well. Patient would benefit from continued PT      Plan: Continue per plan of care.      Precautions:     Daily Treatment Diary:      Initial Evaluation Date: 24  POC Expiration:   Compliance 03/26/24  4/1  4/3  4/8  4/10  4/15  4/17         Visit Number 1 2  3  4  5  6  7         Re-Eval  IE                 MC   Foto Captured Y                              Manuals 4/17   4/8 4/10 4/15   Joint work         -hip Lat distraction and hip IR MWM - Left        -knee         ST work Medial hamstring/jt line L knee   Retrograde and ST through medial knee/hamstring Retrograde and ST thru medial knee/ham Retrograde and ST thru medial knee/ham   Flexibility/PROM                           Neuro Re-Ed         balance                  SLS    5x10\" ea  5x10\" ea   Tandem AE    Floor static 3x20\" ea, AE static 3x20\" ea 4x12\" ea, NBOS AE w/ reaching on demand 4x12\" ea, NBOS AE w/ reaching on demand   Step-ups fwd         Step-ups lat                  lunge         Ther Ex         NuStep/bike NS 8' L2   Srb 6' NS 10'  NS 10'    Hamstring stretch    -              SLR program Flex 3x10 1#   Flex 3x10 1# ea Flex 3x10 1# ea Flex 3x10 1# ea   clamshells S//l gtb 3xfatigue each side   - Isometric hookying gtb 2x15 ea Isometric hookying gtb 2x15 ea   bridges 3x10   2x10 2x10    Standing hip abd 2x15 1# ea   2x15 1# ea 2x15 1# 2x15 1#   Plinth hip " ext 3x10 1# ea        Standing HR 2x15   2x15 2x15 2x15   Hamstring strength         Sit-stand    W/ blue med ball press for trunk work 3x5  W/ blue med ball press for trunk work 3x5   education         Ther Activity         Stair negotiation                  Gait Training                           Modalities                             Access Code: [email or web address]  URL: [email or web address]  Date: 03/26/2024  Prepared by: Jaziel Peratla    Exercises  - Supine Bridge  - 1 x daily - 7 x weekly - 3 sets - 10 reps  - Supine Active Straight Leg Raise  - 1 x daily - 7 x weekly - 3 sets - 10 reps  - Standing Hip Abduction with Counter Support  - 1 x daily - 7 x weekly - 3 sets - 10 reps  - Supine Hamstring Stretch with Strap  - 1 x daily - 7 x weekly - 3 sets - 10 reps

## 2024-04-22 ENCOUNTER — OFFICE VISIT (OUTPATIENT)
Dept: PHYSICAL THERAPY | Facility: CLINIC | Age: 74
End: 2024-04-22
Payer: MEDICARE

## 2024-04-22 DIAGNOSIS — M25.561 ACUTE PAIN OF BOTH KNEES: Primary | ICD-10-CM

## 2024-04-22 DIAGNOSIS — M17.0 PRIMARY OSTEOARTHRITIS OF BOTH KNEES: ICD-10-CM

## 2024-04-22 DIAGNOSIS — M25.562 ACUTE PAIN OF BOTH KNEES: Primary | ICD-10-CM

## 2024-04-22 PROCEDURE — 97140 MANUAL THERAPY 1/> REGIONS: CPT

## 2024-04-22 PROCEDURE — 97110 THERAPEUTIC EXERCISES: CPT

## 2024-04-22 NOTE — PROGRESS NOTES
"Daily Note     Today's date: 2024  Patient name: Kimberly Kowalski  : 1950  MRN: [id number]  Referring provider: oJse L Jacobson MD  Dx:   Encounter Diagnosis     ICD-10-CM    1. Acute pain of both knees  M25.561     M25.562       2. Primary osteoarthritis of both knees  M17.0                      Subjective: Patient reports knees are holding up okay.       Objective: See treatment diary below      Assessment: Kimberly Kowalski tolerated treatment well. Tolerated increased resistances well. Continued treatment indicated to improve functional mobility.       Plan: Continue per plan of care.      Precautions:     Daily Treatment Diary:      Initial Evaluation Date: 24  POC Expiration:   Compliance 03/26/24  4/1  4/3  4/8  4/10  4/15  4/17  4/22       Visit Number 1 2  3  4  5  6  7  8       Re-Eval  IE                 MC   Foto Captured Y                              Manuals 4/17 4/22  4/8 4/10 4/15   Joint work         -hip Lat distraction and hip IR MWM - Left Lat distraction and hip IR MWM - Left       -knee         ST work Medial hamstring/jt line L knee Medial hamstring/jt line L knee  Retrograde and ST through medial knee/hamstring Retrograde and ST thru medial knee/ham Retrograde and ST thru medial knee/ham   Flexibility/PROM                           Neuro Re-Ed         balance                  SLS    5x10\" ea  5x10\" ea   Tandem AE    Floor static 3x20\" ea, AE static 3x20\" ea 4x12\" ea, NBOS AE w/ reaching on demand 4x12\" ea, NBOS AE w/ reaching on demand   Step-ups fwd         Step-ups lat                  lunge         Ther Ex         NuStep/bike NS 8' L2 NS 8' L2  Srb 6' NS 10'  NS 10'    Hamstring stretch    -              SLR program Flex 3x10 1# Flex 3x10 1.5#  Flex 3x10 1# ea Flex 3x10 1# ea Flex 3x10 1# ea   clamshells S//l gtb 3xfatigue each side S//l gtb 3xfatigue each side  - Isometric hookying gtb 2x15 ea Isometric hookying gtb 2x15 ea   bridges 3x10 3x10  2x10 2x10    Standing hip " abd 2x15 1# ea 2x15 1.5# ea  2x15 1# ea 2x15 1# 2x15 1#   Plinth hip ext 3x10 1# ea 3x10 1.5# ea       Standing HR 2x15 2x15  2x15 2x15 2x15   Hamstring strength         Sit-stand    W/ blue med ball press for trunk work 3x5  W/ blue med ball press for trunk work 3x5   education         Ther Activity         Stair negotiation                  Gait Training                           Modalities                             Access Code: [email or web address]  URL: [email or web address]  Date: 03/26/2024  Prepared by: Jaziel Peralta    Exercises  - Supine Bridge  - 1 x daily - 7 x weekly - 3 sets - 10 reps  - Supine Active Straight Leg Raise  - 1 x daily - 7 x weekly - 3 sets - 10 reps  - Standing Hip Abduction with Counter Support  - 1 x daily - 7 x weekly - 3 sets - 10 reps  - Supine Hamstring Stretch with Strap  - 1 x daily - 7 x weekly - 3 sets - 10 reps

## 2024-04-23 ENCOUNTER — OFFICE VISIT (OUTPATIENT)
Dept: FAMILY MEDICINE CLINIC | Facility: CLINIC | Age: 74
End: 2024-04-23
Payer: MEDICARE

## 2024-04-23 VITALS
BODY MASS INDEX: 31.76 KG/M2 | DIASTOLIC BLOOD PRESSURE: 60 MMHG | HEIGHT: 64 IN | SYSTOLIC BLOOD PRESSURE: 122 MMHG | WEIGHT: 186 LBS | OXYGEN SATURATION: 97 % | HEART RATE: 67 BPM

## 2024-04-23 DIAGNOSIS — F33.0 MILD EPISODE OF RECURRENT MAJOR DEPRESSIVE DISORDER (HCC): ICD-10-CM

## 2024-04-23 DIAGNOSIS — G40.909 SEIZURE DISORDER (HCC): ICD-10-CM

## 2024-04-23 DIAGNOSIS — M35.00 SJOGREN'S SYNDROME, WITH UNSPECIFIED ORGAN INVOLVEMENT (HCC): ICD-10-CM

## 2024-04-23 DIAGNOSIS — I10 ESSENTIAL HYPERTENSION: ICD-10-CM

## 2024-04-23 DIAGNOSIS — F51.01 PRIMARY INSOMNIA: ICD-10-CM

## 2024-04-23 DIAGNOSIS — E11.65 TYPE 2 DIABETES MELLITUS WITH HYPERGLYCEMIA, WITHOUT LONG-TERM CURRENT USE OF INSULIN (HCC): Primary | ICD-10-CM

## 2024-04-23 DIAGNOSIS — I10 PRIMARY HYPERTENSION: ICD-10-CM

## 2024-04-23 DIAGNOSIS — G43.909 MIGRAINE WITHOUT STATUS MIGRAINOSUS, NOT INTRACTABLE, UNSPECIFIED MIGRAINE TYPE: ICD-10-CM

## 2024-04-23 DIAGNOSIS — M35.00 SICCA SYNDROME (HCC): ICD-10-CM

## 2024-04-23 DIAGNOSIS — K21.9 GASTROESOPHAGEAL REFLUX DISEASE WITHOUT ESOPHAGITIS: ICD-10-CM

## 2024-04-23 DIAGNOSIS — D43.2 GANGLIOGLIOMA OF BRAIN (HCC): ICD-10-CM

## 2024-04-23 DIAGNOSIS — E78.49 OTHER HYPERLIPIDEMIA: ICD-10-CM

## 2024-04-23 PROBLEM — G47.00 INSOMNIA DISORDER: Status: ACTIVE | Noted: 2024-04-19

## 2024-04-23 LAB — SL AMB POCT HEMOGLOBIN AIC: 7.6 (ref ?–6.5)

## 2024-04-23 PROCEDURE — 83036 HEMOGLOBIN GLYCOSYLATED A1C: CPT | Performed by: NURSE PRACTITIONER

## 2024-04-23 PROCEDURE — 99214 OFFICE O/P EST MOD 30 MIN: CPT | Performed by: NURSE PRACTITIONER

## 2024-04-23 RX ORDER — METFORMIN HYDROCHLORIDE 750 MG/1
750 TABLET, EXTENDED RELEASE ORAL
Qty: 90 TABLET | Refills: 3 | Status: SHIPPED | OUTPATIENT
Start: 2024-04-23

## 2024-04-23 RX ORDER — AMLODIPINE BESYLATE 2.5 MG/1
5 TABLET ORAL DAILY
Qty: 180 TABLET | Refills: 3 | Status: SHIPPED | OUTPATIENT
Start: 2024-04-23

## 2024-04-23 RX ORDER — ATORVASTATIN CALCIUM 20 MG/1
20 TABLET, FILM COATED ORAL
Qty: 90 TABLET | Refills: 3 | Status: SHIPPED | OUTPATIENT
Start: 2024-04-23

## 2024-04-23 NOTE — PROGRESS NOTES
Name: Kimberly Kowalski      : 1950      MRN: [id number]  Encounter Provider: JENI Davis  Encounter Date: 2024   Encounter department: [facility]    Assessment & Plan     1. Type 2 diabetes mellitus with hyperglycemia, without long-term current use of insulin (HCC)  Assessment & Plan:    Lab Results   Component Value Date    HGBA1C 7.6 (A) 2024   Improved from previous which was 8.4.  Notes she has not been eating ice cream like she was previously.      Orders:  -     POCT hemoglobin A1c  -     metFORMIN (GLUCOPHAGE-XR) 750 mg 24 hr tablet; Take 1 tablet (750 mg total) by mouth daily with breakfast    2. 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HCC)  Assessment & Plan:  Stable,  no changes, followed by neurology.        3. Seizure disorder (HCC)  Assessment & Plan:  No recent seizure's.  Followed by neurology - they are weaning her off her Keppra.        4. Other hyperlipidemia  -     atorvastatin (LIPITOR) 20 mg tablet; Take 1 tablet (20 mg total) by mouth daily at bedtime    5. Essential hypertension  -     amLODIPine (NORVASC) 2.5 mg tablet; Take 2 tablets (5 mg total) by mouth daily    6. Gastroesophageal reflux disease without esophagitis  Assessment & Plan:  Controlled at this time.        7. Primary hypertension  Assessment & Plan:  BP within parameters today.        8. Migraine without status migrainosus, not intractable, unspecified migraine type  Assessment & Plan:  Controlled, no recent episodes      9. Mild episode of recurrent major depressive disorder (HCC)  Assessment & Plan:  Controlled, remains of effexor and lexpro by psychiatry.        10. Sjogren's syndrome, with unspecified organ involvement ([facility])  Assessment & Plan:  Stable, no changes.        11. Sicca syndrome ([facility])  Assessment & Plan:  Stable, no issues      12. Primary insomnia  Assessment & Plan:  Continues on Trazodone.              Subjective      Here today for a follow-up.  She is with a hx of T2DM,  "seizure's, depression - reports no new issues.        Review of Systems   Constitutional: Negative.    HENT: Negative.     Respiratory: Negative.     Cardiovascular: Negative.    Gastrointestinal: Negative.    Neurological: Negative.    All other systems reviewed and are negative.      Current Outpatient Medications on File Prior to Visit   Medication Sig    ASPIRIN 81 PO Take 81 mg by mouth daily    calcium citrate-vitamin D 315 mg-5 mcg tablet Take 1 tablet by mouth 2 (two) times a day    cholecalciferol (VITAMIN D3) 1,000 units tablet Take 1,000 Units by mouth daily    escitalopram (LEXAPRO) 20 mg tablet     levETIRAcetam (KEPPRA) 750 mg tablet Take 750 mg by mouth 2 (two) times a day 1 tab in am and 1.5 tabs in pm    Multiple Vitamins-Minerals (MULTIVITAMIN WITH MINERALS) tablet Take 1 tablet by mouth daily    topiramate (TOPAMAX) 100 mg tablet Take 50 mg by mouth 2 (two) times a day    traZODone (DESYREL) 100 mg tablet Take 1 tablet (100 mg total) by mouth daily at bedtime    venlafaxine (EFFEXOR-XR) 75 mg 24 hr capsule Take 1 capsule (75 mg total) by mouth daily    [DISCONTINUED] amLODIPine (NORVASC) 2.5 mg tablet Take 2 tablets (5 mg total) by mouth daily    [DISCONTINUED] atorvastatin (LIPITOR) 20 mg tablet Take 1 tablet (20 mg total) by mouth daily at bedtime    [DISCONTINUED] metFORMIN (GLUCOPHAGE-XR) 750 mg 24 hr tablet Take 1 tablet (750 mg total) by mouth daily with breakfast    gabapentin (NEURONTIN) 300 mg capsule Take 1 capsule (300 mg total) by mouth 3 (three) times a day    lansoprazole (PREVACID) 15 mg capsule Take 1 capsule (15 mg total) by mouth daily       Objective     /60 (BP Location: Left arm, Patient Position: Sitting, Cuff Size: Large)   Pulse 67   Ht 5' 4\" (1.626 m)   Wt 84.4 kg (186 lb)   SpO2 97%   BMI 31.93 kg/m²     Physical Exam  Constitutional:       Appearance: Normal appearance.   Cardiovascular:      Rate and Rhythm: Normal rate and regular rhythm.      Pulses: no " weak pulses.           Dorsalis pedis pulses are 2+ on the right side and 2+ on the left side.   Pulmonary:      Effort: Pulmonary effort is normal.      Breath sounds: Normal breath sounds.   Feet:      Right foot:      Skin integrity: No ulcer, skin breakdown, erythema, warmth, callus or dry skin.      Left foot:      Skin integrity: No ulcer, skin breakdown, erythema, warmth, callus or dry skin.   Neurological:      Mental Status: She is alert.   Psychiatric:         Mood and Affect: Mood normal.         Behavior: Behavior normal.         Thought Content: Thought content normal.         Judgment: Judgment normal.       Diabetic Foot Exam    Patient's shoes and socks removed.    Right Foot/Ankle   Right Foot Inspection  Skin Exam: skin normal and skin intact. No dry skin, no warmth, no callus, no erythema, no maceration, no abnormal color, no pre-ulcer, no ulcer and no callus.     Sensory   Monofilament testing: intact    Vascular  The right DP pulse is 2+.     Left Foot/Ankle  Left Foot Inspection  Skin Exam: skin normal and skin intact. No dry skin, no warmth, no erythema, no maceration, normal color, no pre-ulcer, no ulcer and no callus.     Sensory   Monofilament testing: intact    Vascular  The left DP pulse is 2+.     Assign Risk Category  No deformity present  No loss of protective sensation  No weak pulses  Risk: 0    JENI Davis

## 2024-04-23 NOTE — ASSESSMENT & PLAN NOTE
Lab Results   Component Value Date    HGBA1C 7.6 (A) 04/23/2024   Improved from previous which was 8.4.  Notes she has not been eating ice cream like she was previously.

## 2024-04-24 ENCOUNTER — OFFICE VISIT (OUTPATIENT)
Dept: PHYSICAL THERAPY | Facility: CLINIC | Age: 74
End: 2024-04-24
Payer: MEDICARE

## 2024-04-24 DIAGNOSIS — M25.561 ACUTE PAIN OF BOTH KNEES: Primary | ICD-10-CM

## 2024-04-24 DIAGNOSIS — M17.0 PRIMARY OSTEOARTHRITIS OF BOTH KNEES: ICD-10-CM

## 2024-04-24 DIAGNOSIS — M25.562 ACUTE PAIN OF BOTH KNEES: Primary | ICD-10-CM

## 2024-04-24 PROCEDURE — 97140 MANUAL THERAPY 1/> REGIONS: CPT

## 2024-04-24 PROCEDURE — 97110 THERAPEUTIC EXERCISES: CPT

## 2024-04-24 NOTE — PROGRESS NOTES
"Daily Note     Today's date: 2024  Patient name: Kimberly Kowalski  : 1950  MRN: [id number]  Referring provider: Jose L Jacobson MD  Dx:   Encounter Diagnosis     ICD-10-CM    1. Acute pain of both knees  M25.561     M25.562       2. Primary osteoarthritis of both knees  M17.0                      Subjective: Patient reports she is feeling good. Gets tired with doing things around the house but knees aren't as sore.       Objective: See treatment diary below      Assessment: Kimberly Kowalski tolerated treatment well. Good execution of exercises this visit. Continued treatment should benfit.       Plan: Continue per plan of care.      Precautions:     Daily Treatment Diary:      Initial Evaluation Date: 24  POC Expiration:   Compliance 03/26/24  4/1  4/3  4/8  4/10  4/15  4/17  4/22  4/24     Visit Number 1 2  3  4  5  6  7  8  9     Re-Eval  IE                 MC   Foto Captured Y                              Manuals 4/17 4/22 4/24 4/8 4/10 4/15   Joint work         -hip Lat distraction and hip IR MWM - Left Lat distraction and hip IR MWM - Left Lat distraction and hip IR MWM - Left      -knee         ST work Medial hamstring/jt line L knee Medial hamstring/jt line L knee Medial hamstring/jt line L knee Retrograde and ST through medial knee/hamstring Retrograde and ST thru medial knee/ham Retrograde and ST thru medial knee/ham   Flexibility/PROM                           Neuro Re-Ed         balance                  SLS    5x10\" ea  5x10\" ea   Tandem AE    Floor static 3x20\" ea, AE static 3x20\" ea 4x12\" ea, NBOS AE w/ reaching on demand 4x12\" ea, NBOS AE w/ reaching on demand   Step-ups fwd         Step-ups lat                  lunge         Ther Ex         NuStep/bike NS 8' L2 NS 8' L2 NS 8' L2 Srb 6' NS 10'  NS 10'    Hamstring stretch    -              SLR program Flex 3x10 1# Flex 3x10 1.5# Flex 3x10 1.5# Flex 3x10 1# ea Flex 3x10 1# ea Flex 3x10 1# ea   clamshells S//l gtb 3xfatigue each side " S//l gtb 3xfatigue each side S//l gtb 3xfatigue each side - Isometric hookying gtb 2x15 ea Isometric hookying gtb 2x15 ea   bridges 3x10 3x10 3x12 2x10 2x10    Standing hip abd 2x15 1# ea 2x15 1.5# ea 2x15 1.5# ea 2x15 1# ea 2x15 1# 2x15 1#   Plinth hip ext 3x10 1# ea 3x10 1.5# ea 3x10 1.5# ea      Standing HR 2x15 2x15 3x15 2x15 2x15 2x15   Hamstring strength         Sit-stand    W/ blue med ball press for trunk work 3x5  W/ blue med ball press for trunk work 3x5   education         Ther Activity         Stair negotiation                  Gait Training                           Modalities                             Access Code: [email or web address]  URL: [email or web address]  Date: 03/26/2024  Prepared by: Jaziel Peralta    Exercises  - Supine Bridge  - 1 x daily - 7 x weekly - 3 sets - 10 reps  - Supine Active Straight Leg Raise  - 1 x daily - 7 x weekly - 3 sets - 10 reps  - Standing Hip Abduction with Counter Support  - 1 x daily - 7 x weekly - 3 sets - 10 reps  - Supine Hamstring Stretch with Strap  - 1 x daily - 7 x weekly - 3 sets - 10 reps

## 2024-04-29 ENCOUNTER — APPOINTMENT (OUTPATIENT)
Dept: PHYSICAL THERAPY | Facility: CLINIC | Age: 74
End: 2024-04-29
Payer: MEDICARE

## 2024-04-30 ENCOUNTER — OFFICE VISIT (OUTPATIENT)
Dept: PHYSICAL THERAPY | Facility: CLINIC | Age: 74
End: 2024-04-30
Payer: MEDICARE

## 2024-04-30 DIAGNOSIS — M25.562 ACUTE PAIN OF BOTH KNEES: Primary | ICD-10-CM

## 2024-04-30 DIAGNOSIS — M17.0 PRIMARY OSTEOARTHRITIS OF BOTH KNEES: ICD-10-CM

## 2024-04-30 DIAGNOSIS — M25.561 ACUTE PAIN OF BOTH KNEES: Primary | ICD-10-CM

## 2024-04-30 PROCEDURE — 97110 THERAPEUTIC EXERCISES: CPT

## 2024-04-30 PROCEDURE — 97140 MANUAL THERAPY 1/> REGIONS: CPT

## 2024-04-30 NOTE — PROGRESS NOTES
"Daily Note     Today's date: 2024  Patient name: Kimberly Kowalski  : 1950  MRN: [id number]  Referring provider: Jose L Jacobson MD  Dx:   Encounter Diagnosis     ICD-10-CM    1. Acute pain of both knees  M25.561     M25.562       2. Primary osteoarthritis of both knees  M17.0                      Subjective: Patient reports she is doing pretty wel with moving around without hurting.       Objective: See treatment diary below      Assessment: Kimberly Kowalski tolerated treatment well. Strength improving as well as proper technique with exercises. Continued treatment indicated.       Plan: Continue per plan of care.      Precautions:     Daily Treatment Diary:      Initial Evaluation Date: 24  POC Expiration:   Compliance 03/26/24  4/1  4/3  4/8  4/10  4/15  4/17  4/22  4/24  4/30   Visit Number 1 2  3  4  5  6  7  8  9  10   Re-Eval  IE                 MC   Foto Captured Y                              Manuals 4/17 4/22 4/24 4/30 4/10 4/15   Joint work         -hip Lat distraction and hip IR MWM - Left Lat distraction and hip IR MWM - Left Lat distraction and hip IR MWM - Left Lat distraction and hip IR MWM - Left     -knee         ST work Medial hamstring/jt line L knee Medial hamstring/jt line L knee Medial hamstring/jt line L knee Medial hamstring/jt line L knee Retrograde and ST thru medial knee/ham Retrograde and ST thru medial knee/ham   Flexibility/PROM                           Neuro Re-Ed         balance                  SLS      5x10\" ea   Tandem AE     4x12\" ea, NBOS AE w/ reaching on demand 4x12\" ea, NBOS AE w/ reaching on demand   Step-ups fwd         Step-ups lat                  lunge         Ther Ex         NuStep/bike NS 8' L2 NS 8' L2 NS 8' L2 NS 8' L2 NS 10'  NS 10'    Hamstring stretch                  SLR program Flex 3x10 1# Flex 3x10 1.5# Flex 3x10 1.5# Flex 3x10 1.5# Flex 3x10 1# ea Flex 3x10 1# ea   clamshells S//l gtb 3xfatigue each side S//l gtb 3xfatigue each side S//l gtb " 3xfatigue each side S//l btb 3xfatigue each side Isometric hookying gtb 2x15 ea Isometric hookying gtb 2x15 ea   bridges 3x10 3x10 3x12 3x12 2x10    Standing hip abd 2x15 1# ea 2x15 1.5# ea 2x15 1.5# ea 2x15 1.5# ea 2x15 1# 2x15 1#   Plinth hip ext 3x10 1# ea 3x10 1.5# ea 3x10 1.5# ea 3x10 1.5# ea     Standing HR 2x15 2x15 3x15 3x15 2x15 2x15   Standing marching    3x10 1.5# ea     Hamstring strength         Sit-stand      W/ blue med ball press for trunk work 3x5   education         Ther Activity         Stair negotiation                  Gait Training                           Modalities                             Access Code: [email or web address]  URL: [email or web address]  Date: 03/26/2024  Prepared by: Jaziel Peralta    Exercises  - Supine Bridge  - 1 x daily - 7 x weekly - 3 sets - 10 reps  - Supine Active Straight Leg Raise  - 1 x daily - 7 x weekly - 3 sets - 10 reps  - Standing Hip Abduction with Counter Support  - 1 x daily - 7 x weekly - 3 sets - 10 reps  - Supine Hamstring Stretch with Strap  - 1 x daily - 7 x weekly - 3 sets - 10 reps

## 2024-05-01 ENCOUNTER — APPOINTMENT (OUTPATIENT)
Dept: PHYSICAL THERAPY | Facility: CLINIC | Age: 74
End: 2024-05-01
Payer: MEDICARE

## 2024-05-02 ENCOUNTER — EVALUATION (OUTPATIENT)
Dept: PHYSICAL THERAPY | Facility: CLINIC | Age: 74
End: 2024-05-02
Payer: MEDICARE

## 2024-05-02 DIAGNOSIS — M25.561 ACUTE PAIN OF BOTH KNEES: Primary | ICD-10-CM

## 2024-05-02 DIAGNOSIS — M25.562 ACUTE PAIN OF BOTH KNEES: Primary | ICD-10-CM

## 2024-05-02 DIAGNOSIS — M17.0 PRIMARY OSTEOARTHRITIS OF BOTH KNEES: ICD-10-CM

## 2024-05-02 PROCEDURE — 97140 MANUAL THERAPY 1/> REGIONS: CPT

## 2024-05-02 PROCEDURE — 97110 THERAPEUTIC EXERCISES: CPT

## 2024-05-02 NOTE — PROGRESS NOTES
"Daily Note/Discharge Summary     Today's date: 2024  Patient name: Kimberly Kowalski  : 1950  MRN: [id number]  Referring provider: Jose L Jacobson MD  Dx:   Encounter Diagnosis     ICD-10-CM    1. Acute pain of both knees  M25.561     M25.562       2. Primary osteoarthritis of both knees  M17.0                      Subjective: Patient reports she is doing pretty well and feels she will be able to improve with HEP.      Objective: See treatment diary below      Assessment: Kimberly Kowalski has made good progress towards goals. She ha good understanding of HEP and progression and should do well with D/C to HEP.       Plan:  D/C to HEP as per PT POC.      Precautions:     Daily Treatment Diary:      Initial Evaluation Date: 24  POC Expiration:   Compliance 5/2  4/1  4/3  4/8  4/10  4/15  4/17  4/22  4/24  4/30   Visit Number 11 2  3  4  5  6  7  8  9  10   Amena-Eval                   MC   Foto Captured                               Manuals 4/17 4/22 4/24 4/30 5/2 4/15   Joint work         -hip Lat distraction and hip IR MWM - Left Lat distraction and hip IR MWM - Left Lat distraction and hip IR MWM - Left Lat distraction and hip IR MWM - Left Lat distraction and hip IR MWM - Left    -knee         ST work Medial hamstring/jt line L knee Medial hamstring/jt line L knee Medial hamstring/jt line L knee Medial hamstring/jt line L knee Medial hamstring/jt line L knee Retrograde and ST thru medial knee/ham   Flexibility/PROM                           Neuro Re-Ed         balance                  SLS      5x10\" ea   Tandem AE      4x12\" ea, NBOS AE w/ reaching on demand   Step-ups fwd         Step-ups lat                  lunge         Ther Ex         NuStep/bike NS 8' L2 NS 8' L2 NS 8' L2 NS 8' L2 NS 8' L2 NS 10'    Hamstring stretch                  SLR program Flex 3x10 1# Flex 3x10 1.5# Flex 3x10 1.5# Flex 3x10 1.5# Flex 3x10 1.5# Flex 3x10 1# ea   clamshells S//l gtb 3xfatigue each side S//l gtb 3xfatigue " each side S//l gtb 3xfatigue each side S//l btb 3xfatigue each side S//l btb 3xfatigue each side Isometric hookying gtb 2x15 ea   bridges 3x10 3x10 3x12 3x12 3x12    Standing hip abd 2x15 1# ea 2x15 1.5# ea 2x15 1.5# ea 2x15 1.5# ea 2x15 1.5# ea 2x15 1#   Plinth hip ext 3x10 1# ea 3x10 1.5# ea 3x10 1.5# ea 3x10 1.5# ea 3x10 1.5# ea    Standing HR 2x15 2x15 3x15 3x15 3x15 2x15   Standing marching    3x10 1.5# ea 3x10 1.5# ea    Hamstring strength         Sit-stand      W/ blue med ball press for trunk work 3x5   education         Ther Activity         Stair negotiation                  Gait Training                           Modalities                             Access Code: [email or web address]  URL: [email or web address]  Date: 03/26/2024  Prepared by: Jaziel Peralta    Exercises  - Supine Bridge  - 1 x daily - 7 x weekly - 3 sets - 10 reps  - Supine Active Straight Leg Raise  - 1 x daily - 7 x weekly - 3 sets - 10 reps  - Standing Hip Abduction with Counter Support  - 1 x daily - 7 x weekly - 3 sets - 10 reps  - Supine Hamstring Stretch with Strap  - 1 x daily - 7 x weekly - 3 sets - 10 reps

## 2024-05-24 ENCOUNTER — TELEPHONE (OUTPATIENT)
Age: 74
End: 2024-05-24

## 2024-05-24 ENCOUNTER — APPOINTMENT (EMERGENCY)
Dept: RADIOLOGY | Facility: HOSPITAL | Age: 74
End: 2024-05-24
Payer: MEDICARE

## 2024-05-24 ENCOUNTER — HOSPITAL ENCOUNTER (EMERGENCY)
Facility: HOSPITAL | Age: 74
Discharge: HOME | End: 2024-05-24
Attending: EMERGENCY MEDICINE
Payer: MEDICARE

## 2024-05-24 VITALS
HEART RATE: 100 BPM | BODY MASS INDEX: 31.58 KG/M2 | OXYGEN SATURATION: 93 % | WEIGHT: 185 LBS | RESPIRATION RATE: 20 BRPM | DIASTOLIC BLOOD PRESSURE: 80 MMHG | HEIGHT: 64 IN | TEMPERATURE: 97 F | SYSTOLIC BLOOD PRESSURE: 172 MMHG

## 2024-05-24 DIAGNOSIS — S61.217A LACERATION OF LEFT LITTLE FINGER WITHOUT FOREIGN BODY WITHOUT DAMAGE TO NAIL, INITIAL ENCOUNTER: Primary | ICD-10-CM

## 2024-05-24 PROCEDURE — 90715 TDAP VACCINE 7 YRS/> IM: CPT | Performed by: EMERGENCY MEDICINE

## 2024-05-24 PROCEDURE — 73140 X-RAY EXAM OF FINGER(S): CPT

## 2024-05-24 PROCEDURE — 90471 IMMUNIZATION ADMIN: CPT

## 2024-05-24 PROCEDURE — 99284 EMERGENCY DEPT VISIT MOD MDM: CPT | Performed by: EMERGENCY MEDICINE

## 2024-05-24 PROCEDURE — 99283 EMERGENCY DEPT VISIT LOW MDM: CPT

## 2024-05-24 PROCEDURE — 12001 RPR S/N/AX/GEN/TRNK 2.5CM/<: CPT | Performed by: EMERGENCY MEDICINE

## 2024-05-24 RX ORDER — AMOXICILLIN AND CLAVULANATE POTASSIUM 500; 125 MG/1; MG/1
1 TABLET, FILM COATED ORAL EVERY 12 HOURS SCHEDULED
Qty: 10 TABLET | Refills: 0 | Status: SHIPPED | OUTPATIENT
Start: 2024-05-24 | End: 2024-05-29

## 2024-05-24 RX ORDER — AMOXICILLIN AND CLAVULANATE POTASSIUM 500; 125 MG/1; MG/1
1 TABLET, FILM COATED ORAL EVERY 12 HOURS SCHEDULED
Qty: 10 TABLET | Refills: 0 | Status: SHIPPED | OUTPATIENT
Start: 2024-05-24 | End: 2024-05-24

## 2024-05-24 RX ADMIN — TETANUS TOXOID, REDUCED DIPHTHERIA TOXOID AND ACELLULAR PERTUSSIS VACCINE, ADSORBED 0.5 ML: 5; 2.5; 8; 8; 2.5 SUSPENSION INTRAMUSCULAR at 15:19

## 2024-05-24 NOTE — TELEPHONE ENCOUNTER
Appointment scheduled with provider.    Reason: Office Visit    Symptoms: Follow up from ER visit on 5/24/2024, sutures removal    Provider: JENI Davis    Date/Time: Monday 6/3/2024 at 3:15 pm

## 2024-05-24 NOTE — ED PROVIDER NOTES
History  Chief Complaint   Patient presents with    Laceration     Pt cut left second digit with chain saw while trimming trees      Laceration to left pinky finger approximately 1 hour ago while using a chainsaw.  No other injuries or complaints      History provided by:  Patient   used: No    Laceration  Location:  Finger  Finger laceration location:  L little finger  Length:  2  Depth:  Through underlying tissue  Quality comment:  Flap  Bleeding: controlled    Time since incident:  1 hour  Injury mechanism: Chainsaw.  Pain details:     Quality:  Aching    Severity:  Moderate    Timing:  Constant    Progression:  Unchanged  Foreign body present:  No foreign bodies  Relieved by:  Nothing  Worsened by:  Nothing  Tetanus status:  Unknown  Associated symptoms: no fever and no rash        Prior to Admission Medications   Prescriptions Last Dose Informant Patient Reported? Taking?   ASPIRIN 81 PO   Yes No   Sig: Take 81 mg by mouth daily   Multiple Vitamins-Minerals (MULTIVITAMIN WITH MINERALS) tablet   Yes No   Sig: Take 1 tablet by mouth daily   amLODIPine (NORVASC) 2.5 mg tablet   No No   Sig: Take 2 tablets (5 mg total) by mouth daily   atorvastatin (LIPITOR) 20 mg tablet   No No   Sig: Take 1 tablet (20 mg total) by mouth daily at bedtime   calcium citrate-vitamin D 315 mg-5 mcg tablet   Yes No   Sig: Take 1 tablet by mouth 2 (two) times a day   cholecalciferol (VITAMIN D3) 1,000 units tablet   Yes No   Sig: Take 1,000 Units by mouth daily   escitalopram (LEXAPRO) 20 mg tablet   Yes No   gabapentin (NEURONTIN) 300 mg capsule   No No   Sig: Take 1 capsule (300 mg total) by mouth 3 (three) times a day   lansoprazole (PREVACID) 15 mg capsule   No No   Sig: Take 1 capsule (15 mg total) by mouth daily   levETIRAcetam (KEPPRA) 750 mg tablet   Yes No   Sig: Take 750 mg by mouth 2 (two) times a day 1 tab in am and 1.5 tabs in pm   metFORMIN (GLUCOPHAGE-XR) 750 mg 24 hr tablet   No No   Sig: Take 1  tablet (750 mg total) by mouth daily with breakfast   topiramate (TOPAMAX) 100 mg tablet   Yes No   Sig: Take 50 mg by mouth 2 (two) times a day   traZODone (DESYREL) 100 mg tablet   No No   Sig: Take 1 tablet (100 mg total) by mouth daily at bedtime   venlafaxine (EFFEXOR-XR) 75 mg 24 hr capsule   No No   Sig: Take 1 capsule (75 mg total) by mouth daily      Facility-Administered Medications: None       Past Medical History:   Diagnosis Date    Cervicalgia     Depression     Diabetes mellitus (HCC)     Esophageal reflux     Hyperlipidemia     Hypertension     Essential    Migraine     Nonspecific abnormal electrocardiogram (ECG)     Sciatica     Seizure disorder (HCC)     Sjogren's syndrome (HCC)        Past Surgical History:   Procedure Laterality Date    CERVICAL POLYPECTOMY      CRANIOTOMY       Left frontal craniotomy for resection of tumor-Dr. [facility]     TUBAL LIGATION         Family History   Problem Relation Age of Onset    Aneurysm Mother     Heart disease Father     Lupus Sister     Thyroid cancer Sister         age unknown    No Known Problems Sister     No Known Problems Sister     Diabetes Maternal Grandmother     No Known Problems Maternal Grandfather     No Known Problems Paternal Grandmother     No Known Problems Paternal Grandfather     Heart disease Brother     Diabetes Maternal Aunt     No Known Problems Maternal Aunt     No Known Problems Maternal Aunt     No Known Problems Paternal Aunt     Breast cancer Neg Hx     BRCA2 Positive Neg Hx     BRCA1 Positive Neg Hx     Endometrial cancer Neg Hx     Colon cancer Neg Hx     Ovarian cancer Neg Hx     BRCA1 Negative Neg Hx     BRCA2 Negative Neg Hx     Breast cancer additional onset Neg Hx     BRCA 1/2 Neg Hx      I have reviewed and agree with the history as documented.    E-Cigarette/Vaping    E-Cigarette Use Never User      E-Cigarette/Vaping Substances     Social History     Tobacco Use    Smoking status: Never      Passive exposure: Never    Smokeless tobacco: Never   Vaping Use    Vaping status: Never Used   Substance Use Topics    Alcohol use: Never    Drug use: Never       Review of Systems   Constitutional:  Negative for chills and fever.   HENT:  Negative for ear pain, hearing loss, sore throat, trouble swallowing and voice change.    Eyes:  Negative for pain and discharge.   Respiratory:  Negative for cough, shortness of breath and wheezing.    Cardiovascular:  Negative for chest pain and palpitations.   Gastrointestinal:  Negative for abdominal pain, blood in stool, constipation, diarrhea, nausea and vomiting.   Genitourinary:  Negative for dysuria, flank pain, frequency and hematuria.   Musculoskeletal:  Negative for joint swelling, neck pain and neck stiffness.   Skin:  Negative for rash and wound.   Neurological:  Negative for dizziness, seizures, syncope, facial asymmetry and headaches.   Psychiatric/Behavioral:  Negative for hallucinations, self-injury and suicidal ideas.    All other systems reviewed and are negative.      Physical Exam  Physical Exam  Vitals and nursing note reviewed.   Constitutional:       General: She is not in acute distress.     Appearance: She is well-developed. She is not ill-appearing or diaphoretic.   HENT:      Head: Normocephalic and atraumatic.      Right Ear: External ear normal.      Left Ear: External ear normal.   Eyes:      General: No scleral icterus.        Right eye: No discharge.         Left eye: No discharge.      Extraocular Movements: Extraocular movements intact.      Conjunctiva/sclera: Conjunctivae normal.   Pulmonary:      Effort: Pulmonary effort is normal. No respiratory distress.   Musculoskeletal:         General: No deformity or signs of injury. Normal range of motion.      Cervical back: Normal range of motion and neck supple.      Comments: Flap laceration distal phalanx left pinky finger.  No active bleeding.  Approximately 2 cm   Skin:     General: Skin is  warm and dry.      Coloration: Skin is not jaundiced or pale.   Neurological:      General: No focal deficit present.      Mental Status: She is alert and oriented to person, place, and time.      Cranial Nerves: No cranial nerve deficit.      Coordination: Coordination normal.      Gait: Gait normal.   Psychiatric:         Mood and Affect: Mood normal.         Behavior: Behavior normal.         Thought Content: Thought content normal.         Judgment: Judgment normal.         Vital Signs  ED Triage Vitals [05/24/24 1432]   Temperature Pulse Respirations Blood Pressure SpO2   (!) 97 °F (36.1 °C) 100 20 (!) 172/80 93 %      Temp Source Heart Rate Source Patient Position - Orthostatic VS BP Location FiO2 (%)   Temporal Monitor Sitting Right arm --      Pain Score       --           Vitals:    05/24/24 1432   BP: (!) 172/80   Pulse: 100   Patient Position - Orthostatic VS: Sitting         Visual Acuity      ED Medications  Medications   tetanus-diphtheria-acellular pertussis (BOOSTRIX) IM injection 0.5 mL (0.5 mL Intramuscular Given 5/24/24 1519)       Diagnostic Studies  Results Reviewed       None                   XR finger fifth digit-pinkie LEFT   ED Interpretation by Bhavin Hernandez MD (05/24 1532)   No fracture                 Procedures  Universal Protocol:  Consent: Verbal consent obtained. Written consent not obtained.  Consent given by: patient  Patient identity confirmed: arm band and verbally with patient  Laceration repair    Date/Time: 5/24/2024 2:56 PM    Performed by: Bhavin Hernandez MD  Authorized by: Bhavin Hernandez MD  Body area: upper extremity  Location details: left small finger  Laceration length: 2 cm  Tendon involvement: none  Nerve involvement: none  Vascular damage: no  Anesthesia: digital block    Anesthesia:  Local Anesthetic: lidocaine 1% without epinephrine      Procedure Details:  Preparation: Patient was prepped and draped in the usual sterile fashion.  Irrigation solution:  saline  Irrigation method: syringe  Amount of cleaning: standard  Wound skin closure material used: 4-0 Ethilon.  Number of sutures: 4  Technique: simple  Approximation: close  Approximation difficulty: simple  Dressing: gauze roll (Finger splint)  Patient tolerance: patient tolerated the procedure well with no immediate complications               ED Course                               SBIRT 20yo+      Flowsheet Row Most Recent Value   Initial Alcohol Screen: US AUDIT-C     1. How often do you have a drink containing alcohol? 0 Filed at: 05/24/2024 1431   2. How many drinks containing alcohol do you have on a typical day you are drinking?  0 Filed at: 05/24/2024 1431   3b. FEMALE Any Age, or MALE 65+: How often do you have 4 or more drinks on one occassion? 0 Filed at: 05/24/2024 1431   Audit-C Score 0 Filed at: 05/24/2024 1431   MELVIN: How many times in the past year have you...    Used an illegal drug or used a prescription medication for non-medical reasons? Never Filed at: 05/24/2024 1431                      Medical Decision Making  Based on the history and medical screening exam performed the diagnostic considerations include but are not limited to anger laceration, finger fracture.    Based on the work-up performed in the emergency room which includes physical examination, and which may include laboratory studies and imaging as warranted including advanced imaging such as CT scan or ultrasound, the diagnostic considerations are narrowed to exclude limb or life-threatening process.    The patient is stable for discharge.      Amount and/or Complexity of Data Reviewed  Radiology: ordered and independent interpretation performed. Decision-making details documented in ED Course.     Details: Finger x-ray negative    Risk  Prescription drug management.             Disposition  Final diagnoses:   Laceration of left little finger without foreign body without damage to nail, initial encounter     Time reflects when  diagnosis was documented in both MDM as applicable and the Disposition within this note       Time User Action Codes Description Comment    5/24/2024  3:15 PM Bhavin Hernandez Add [S61.217A] Laceration of left little finger without foreign body without damage to nail, initial encounter           ED Disposition       ED Disposition   Discharge    Condition   Stable    Date/Time   Fri May 24, 2024 1515    Comment   Kimberly Dex discharge to home/self care.                   Follow-up Information       Follow up With Specialties Details Why Contact Info    [facility], Nurse Practitioner   [address] [facility] [address] [phone]              Patient's Medications   Discharge Prescriptions    AMOXICILLIN-CLAVULANATE (AUGMENTIN) 500-125 MG PER TABLET    Take 1 tablet by mouth every 12 (twelve) hours for 5 days       Start Date: 5/24/2024 End Date: 5/29/2024       Order Dose: 1 tablet       Quantity: 10 tablet    Refills: 0       No discharge procedures on file.    PDMP Review       None            ED Provider  Electronically Signed by             Bhavin Hernandez MD  05/24/24 5298

## 2024-05-28 ENCOUNTER — VBI (OUTPATIENT)
Dept: FAMILY MEDICINE CLINIC | Facility: CLINIC | Age: 74
End: 2024-05-28

## 2024-05-28 NOTE — TELEPHONE ENCOUNTER
05/28/24 11:52 AM    Patient contacted post ED visit, [facility] department spoke with patient/caregiver and outreach was successful.    Thank you.  Jackie Farias  [facility]

## 2024-06-03 ENCOUNTER — OFFICE VISIT (OUTPATIENT)
Dept: FAMILY MEDICINE CLINIC | Facility: CLINIC | Age: 74
End: 2024-06-03
Payer: MEDICARE

## 2024-06-03 VITALS
OXYGEN SATURATION: 96 % | BODY MASS INDEX: 31.79 KG/M2 | HEIGHT: 64 IN | HEART RATE: 72 BPM | WEIGHT: 186.2 LBS | SYSTOLIC BLOOD PRESSURE: 108 MMHG | DIASTOLIC BLOOD PRESSURE: 55 MMHG

## 2024-06-03 DIAGNOSIS — S61.217S LACERATION OF LEFT LITTLE FINGER WITHOUT FOREIGN BODY WITHOUT DAMAGE TO NAIL, SEQUELA: ICD-10-CM

## 2024-06-03 DIAGNOSIS — Z48.02 VISIT FOR SUTURE REMOVAL: Primary | ICD-10-CM

## 2024-06-03 DIAGNOSIS — S62.667D CLOSED NONDISPLACED FRACTURE OF DISTAL PHALANX OF LEFT LITTLE FINGER WITH ROUTINE HEALING, SUBSEQUENT ENCOUNTER: ICD-10-CM

## 2024-06-03 PROCEDURE — 99214 OFFICE O/P EST MOD 30 MIN: CPT | Performed by: NURSE PRACTITIONER

## 2024-06-03 PROCEDURE — G2211 COMPLEX E/M VISIT ADD ON: HCPCS | Performed by: NURSE PRACTITIONER

## 2024-06-04 NOTE — PROGRESS NOTES
Ambulatory Visit  Name: Kimberly Kowalski      : 1950      MRN: [id number]  Encounter Provider: JENI Davis  Encounter Date: 6/3/2024   Encounter department: [facility]    Assessment & Plan   1. Visit for suture removal  2. Laceration of left little finger without foreign body without damage to nail, sequela  -     Ambulatory Referral to Orthopedic Surgery; Future  3. Closed nondisplaced fracture of distal phalanx of left little finger with routine healing, subsequent encounter  -     Ambulatory Referral to Orthopedic Surgery; Future     Referral placed to ortho-hand for evaluation due to fracture.  Advised her to keep splint in place to provide support for it.    Steri-strip in place - advised to keep there until the fall off.    History of Present Illness     Here today for suture removal from laceration to left fifth finger - she reports laceration from a small chainsaw.  She did need 4 sutures placed. Also nondisplaced fracture of the distal phalanx of the fifth finger found on xray.  Antibiotics completed from the urgent care.          Review of Systems   Skin:  Positive for wound.   All other systems reviewed and are negative.    Current Outpatient Medications on File Prior to Visit   Medication Sig Dispense Refill    amLODIPine (NORVASC) 2.5 mg tablet Take 2 tablets (5 mg total) by mouth daily 180 tablet 3    ASPIRIN 81 PO Take 81 mg by mouth daily      atorvastatin (LIPITOR) 20 mg tablet Take 1 tablet (20 mg total) by mouth daily at bedtime 90 tablet 3    calcium citrate-vitamin D 315 mg-5 mcg tablet Take 1 tablet by mouth 2 (two) times a day      cholecalciferol (VITAMIN D3) 1,000 units tablet Take 1,000 Units by mouth daily      escitalopram (LEXAPRO) 20 mg tablet       gabapentin (NEURONTIN) 300 mg capsule Take 1 capsule (300 mg total) by mouth 3 (three) times a day 270 capsule 3    lansoprazole (PREVACID) 15 mg capsule Take 1 capsule (15 mg total) by mouth daily  "90 capsule 1    levETIRAcetam (KEPPRA) 750 mg tablet Take 750 mg by mouth 2 (two) times a day 1 tab in am and 1.5 tabs in pm      metFORMIN (GLUCOPHAGE-XR) 750 mg 24 hr tablet Take 1 tablet (750 mg total) by mouth daily with breakfast 90 tablet 3    Multiple Vitamins-Minerals (MULTIVITAMIN WITH MINERALS) tablet Take 1 tablet by mouth daily      topiramate (TOPAMAX) 100 mg tablet Take 50 mg by mouth 2 (two) times a day      traZODone (DESYREL) 100 mg tablet Take 1 tablet (100 mg total) by mouth daily at bedtime 90 tablet 1    venlafaxine (EFFEXOR-XR) 75 mg 24 hr capsule Take 1 capsule (75 mg total) by mouth daily 90 capsule 1     No current facility-administered medications on file prior to visit.      Objective     /55 (BP Location: Right arm, Patient Position: Sitting, Cuff Size: Large)   Pulse 72   Ht 5' 4\" (1.626 m)   Wt 84.5 kg (186 lb 3.2 oz)   SpO2 96%   BMI 31.96 kg/m²     Physical Exam  Skin:     Comments: Approximate 1 cm laceration to distal portion of left fifth finger.  Intact after suture removal.     Neurological:      Mental Status: She is alert.     Suture removal    Date/Time: 6/3/2024 3:15 PM    Performed by: JENI Davis  Authorized by: JENI Davis  Universal Protocol:  Consent: Verbal consent obtained.  Consent given by: patient  Patient understanding: patient states understanding of the procedure being performed  Patient identity confirmed: verbally with patient      Patient location:  Bedside  Location:     Laterality:  Left    Location:  Upper extremity    Upper extremity location:  Hand    Hand location:  L small finger  Procedure details:     Tools used:  Scissors and tweezers    Wound appearance:  No sign(s) of infection and good wound healing    Number of sutures removed:  4  Post-procedure details:     Post-removal:  Steri-Strips applied    Patient tolerance of procedure:  Tolerated well, no immediate complications      Administrative Statements "

## 2024-06-10 ENCOUNTER — OFFICE VISIT (OUTPATIENT)
Dept: OBGYN CLINIC | Facility: CLINIC | Age: 74
End: 2024-06-10
Payer: MEDICARE

## 2024-06-10 VITALS
DIASTOLIC BLOOD PRESSURE: 70 MMHG | HEIGHT: 64 IN | WEIGHT: 186 LBS | BODY MASS INDEX: 31.76 KG/M2 | SYSTOLIC BLOOD PRESSURE: 120 MMHG

## 2024-06-10 DIAGNOSIS — S62.667D CLOSED NONDISPLACED FRACTURE OF DISTAL PHALANX OF LEFT LITTLE FINGER WITH ROUTINE HEALING, SUBSEQUENT ENCOUNTER: ICD-10-CM

## 2024-06-10 DIAGNOSIS — S61.217S LACERATION OF LEFT LITTLE FINGER WITHOUT FOREIGN BODY WITHOUT DAMAGE TO NAIL, SEQUELA: ICD-10-CM

## 2024-06-10 PROCEDURE — 99203 OFFICE O/P NEW LOW 30 MIN: CPT | Performed by: SURGERY

## 2024-06-10 NOTE — PROGRESS NOTES
Assessment    Left small finger distal tuft fracture with laceration  DOI:  5/24/2024  Sutures removed last week.  Healing well.      Plan    Discontinue splinting and start gentle active and passive range of motion exercises that were demonstrated in the office today.  Once swelling subsides, full motion will return.  Wash area directly, Do not have to cover at this point.  Activities to tolerance and increase as able.  May follow-up as needed with instructions to call the office with any questions or concerns.        Subjective     HPI    Patient ID:  Kimberly Kowalski is a 74 y.o. female here for evaluation of the left small finger.  According to the patient and chart review, she stained a laceration to the left small finger on 5/24/2024 while using a saw to trim hedges/trees.  She presented to the ER where the laceration was sutured with nylon sutures, and she was placed in a splint and referred here.  She did see primary care about a week ago who removed the sutures.  Today the patient states she has minimal pain in the left small finger.  No associated numbness and tingling.  She has been using the splint at all times.  No fever or chills.      The following portions of the patient's history were reviewed and updated as appropriate: allergies, current medications, past family history, past medical history, past social history, past surgical history, and problem list.    Review of Systems     Objective    Imaging:  Left small finger x-rays 5/24/2024    FINDINGS:     Nondisplaced fracture of the base of the fifth distal phalanx best appreciated on the lateral oblique images.     No significant degenerative changes.     No lytic or blastic osseous lesion.     Diffuse soft tissue swelling.     IMPRESSION:     Nondisplaced fifth distal phalanx fracture.    Physical Exam     General appearance:  NAD   Cardiac:  Regular rate  Lungs:  Unlabored breathing  Abdomen:  Non-distended    Orthopedic Examination:  Left small  finger     Previous laceration on the ulnar side of the distal phalanx region is now scabbed without signs of infection.  There is no erythema or drainage.  There is mild swelling about the distal phalanx.  She is able to actively flex and extend at the DIP and PIP joints however this is reduced secondary to stiffness and swelling.  Strength testing deferred.  Sensation intact to light touch  Good cap refill at the fingertip  Palpable radial pulse.

## 2024-06-18 RX ORDER — TOPIRAMATE 50 MG/1
TABLET, FILM COATED ORAL
COMMUNITY
Start: 2024-04-27

## 2024-06-19 ENCOUNTER — OFFICE VISIT (OUTPATIENT)
Dept: OBGYN CLINIC | Facility: CLINIC | Age: 74
End: 2024-06-19
Payer: MEDICARE

## 2024-06-19 VITALS
OXYGEN SATURATION: 97 % | HEIGHT: 64 IN | TEMPERATURE: 97.6 F | BODY MASS INDEX: 31.76 KG/M2 | WEIGHT: 186 LBS | DIASTOLIC BLOOD PRESSURE: 68 MMHG | HEART RATE: 65 BPM | SYSTOLIC BLOOD PRESSURE: 120 MMHG

## 2024-06-19 DIAGNOSIS — M25.562 CHRONIC PAIN OF LEFT KNEE: Primary | ICD-10-CM

## 2024-06-19 DIAGNOSIS — M17.12 PRIMARY OSTEOARTHRITIS OF LEFT KNEE: ICD-10-CM

## 2024-06-19 DIAGNOSIS — G89.29 CHRONIC PAIN OF LEFT KNEE: Primary | ICD-10-CM

## 2024-06-19 PROCEDURE — 20610 DRAIN/INJ JOINT/BURSA W/O US: CPT | Performed by: STUDENT IN AN ORGANIZED HEALTH CARE EDUCATION/TRAINING PROGRAM

## 2024-06-19 PROCEDURE — 99213 OFFICE O/P EST LOW 20 MIN: CPT | Performed by: STUDENT IN AN ORGANIZED HEALTH CARE EDUCATION/TRAINING PROGRAM

## 2024-06-19 RX ORDER — BUPIVACAINE HYDROCHLORIDE 2.5 MG/ML
2 INJECTION, SOLUTION INFILTRATION; PERINEURAL
Status: COMPLETED | OUTPATIENT
Start: 2024-06-19 | End: 2024-06-19

## 2024-06-19 RX ORDER — TRIAMCINOLONE ACETONIDE 40 MG/ML
40 INJECTION, SUSPENSION INTRA-ARTICULAR; INTRAMUSCULAR
Status: COMPLETED | OUTPATIENT
Start: 2024-06-19 | End: 2024-06-19

## 2024-06-19 RX ADMIN — BUPIVACAINE HYDROCHLORIDE 2 ML: 2.5 INJECTION, SOLUTION INFILTRATION; PERINEURAL at 11:15

## 2024-06-19 RX ADMIN — TRIAMCINOLONE ACETONIDE 40 MG: 40 INJECTION, SUSPENSION INTRA-ARTICULAR; INTRAMUSCULAR at 11:15

## 2024-06-19 NOTE — PROGRESS NOTES
1. Chronic pain of left knee  Injection Procedure Prior Authorization    Large joint arthrocentesis: L knee      2. Primary osteoarthritis of left knee  Injection Procedure Prior Authorization    Large joint arthrocentesis: L knee        Orders Placed This Encounter   Procedures    Large joint arthrocentesis: L knee    Injection Procedure Prior Authorization        Imaging Studies (I personally reviewed images in PACS and report):    X-ray left knee 12/8/2023: Mild tricompartmental osteoarthritic changes. No acute osseous abnormalities. No joint effusion.     IMPRESSION:  Chronic left knee pain  Primary osteoarthritis of the left knee  Reportedly only sustained <3 months relief from an intra-articular cortisone injection in the past concurrent with formal physical therapy    Other factors:  BMI 32  CKD 3  Type 2 diabetes  Reported history of rheumatoid arthritis  Sjogren's syndrome      Clinical exam and radiographic imaging reviewed with patient today, with impression as per above. I have discussed with the patient the pathophysiology of this diagnosis and reviewed how the examination correlates with this diagnosis.    Given patient only sustained less than 3 months relief from the cortisone injection as well as other conservative treatment through formal physical therapy, I counseled that we could alternatively try a viscosupplementation injection for her left knee to provide further pain relief from arthritis.  Patient agreeable to this neck step in order was placed today.  In the interim, while waiting for viscosupplement injection approval, I did offer an intra-articular cortisone injection of her knee today and after reviewing risk/benefits she was agreeable to this procedure as per below.  Other treatment modalities including use of Tylenol, use of a compression knee sleeve during activities, heat/ice therapy were discussed.  Counseled continue maintenance of her home exercise program from therapy.    Return  "for Follow up after approval of visco injection for left knee.    Portions of the record may have been created with voice recognition software. Occasional wrong word or \"sound a like\" substitutions may have occurred due to the inherent limitations of voice recognition software. Read the chart carefully and recognize, using context, where substitutions have occurred.     CHIEF COMPLAINT:  Chief Complaint   Patient presents with    Left Knee - Follow-up    Follow-up         HPI:  Kimberly Kowalski is a 74 y.o. female  who presents with her significant other for       Visit 6/19/2024 :  Follow-up evaluation of left knee pain:  Patient last seen approximately 3 months ago when she was given an intra-articular cortisone injection of her knee.  She had also been attending physical therapy for at least 6 weeks  She states she was doing very well with these interventions but that she felt the injection only lasted about a couple months.  She states she has been experiencing worsening pain of her left knee over the past 4 weeks without a precipitating injury.  She does note that she has been much more active and doing yard work 4 weeks ago as well and maybe she thinks she may have been overexerting herself. Denies use of knee brace with activities.  Pain of her left knee is over the medial aspect.  Describes as sharp/aching pain that is aggravated with prolonged walking and ascending/descending stairs.  She reports stiffness of her knee but denies swelling or discoloration.  Reports crepitus of her knee but denies any giving out or locking sensation.  Pain does not wake her up at night.  She currently has been taking Tylenol with limited relief.          Medical, Surgical, Family, and Social History    Past Medical History:   Diagnosis Date    Cervicalgia     Depression     Diabetes mellitus (HCC)     Esophageal reflux     Hyperlipidemia     Hypertension     Essential    Migraine     Nonspecific abnormal electrocardiogram (ECG)  " "   Sciatica     Seizure disorder (HCC)     Sjogren's syndrome (HCC)      Past Surgical History:   Procedure Laterality Date    CERVICAL POLYPECTOMY      CRANIOTOMY       Left frontal craniotomy for resection of tumor-Dr. Jose Luis Murillo [facility]     TUBAL LIGATION       Social History   Social History     Substance and Sexual Activity   Alcohol Use Never     Social History     Substance and Sexual Activity   Drug Use Never     Social History     Tobacco Use   Smoking Status Never    Passive exposure: Never   Smokeless Tobacco Never     Family History   Problem Relation Age of Onset    Aneurysm Mother     Heart disease Father     Lupus Sister     Thyroid cancer Sister         age unknown    No Known Problems Sister     No Known Problems Sister     Diabetes Maternal Grandmother     No Known Problems Maternal Grandfather     No Known Problems Paternal Grandmother     No Known Problems Paternal Grandfather     Heart disease Brother     Diabetes Maternal Aunt     No Known Problems Maternal Aunt     No Known Problems Maternal Aunt     No Known Problems Paternal Aunt     Breast cancer Neg Hx     BRCA2 Positive Neg Hx     BRCA1 Positive Neg Hx     Endometrial cancer Neg Hx     Colon cancer Neg Hx     Ovarian cancer Neg Hx     BRCA1 Negative Neg Hx     BRCA2 Negative Neg Hx     Breast cancer additional onset Neg Hx     BRCA 1/2 Neg Hx      Allergies   Allergen Reactions    Lamotrigine      Other reaction(s): LAMOTRIGINE (LAMICTAL) rash          Physical Exam  /68 (BP Location: Left arm)   Pulse 65   Temp 97.6 °F (36.4 °C)   Ht 5' 4\" (1.626 m)   Wt 84.4 kg (186 lb)   SpO2 97%   BMI 31.93 kg/m²     Constitutional:  see vital signs  Gen: BMI 32, normocephalic/atraumatic, well-groomed  Eyes: No inflammation or discharge of conjunctiva or lids; sclera clear   Pulmonary/Chest: Effort normal. No respiratory distress.     Ortho Exam  Left Knee Exam:  Erythema: no  Swelling: no  Increased Warmth: no  Tenderness: " +MJL  ROM: 0-130  Knee flexion strength: 5/5  Knee extension strength: 5/5  Patellar Grind: negative  Varus laxity: negative  Valgus laxity: negative  [facility]: negative     No calf tenderness to palpation       Large joint arthrocentesis: L knee  Universal Protocol:  Consent: Verbal consent obtained.  Risks and benefits: risks, benefits and alternatives were discussed  Consent given by: patient  Patient understanding: patient states understanding of the procedure being performed  Site marked: the operative site was marked  Radiology Images displayed and confirmed. If images not available, report reviewed: imaging studies available  Patient identity confirmed: verbally with patient  Supporting Documentation  Indications: pain   Procedure Details  Location: knee - L knee  Preparation: Patient was prepped and draped in the usual sterile fashion  Needle size: 22 G  Ultrasound guidance: no  Approach: anterolateral  Medications administered: 40 mg triamcinolone acetonide 40 mg/mL; 2 mL bupivacaine 0.25 %    Patient tolerance: patient tolerated the procedure well with no immediate complications  Dressing:  Sterile dressing applied

## 2024-07-12 ENCOUNTER — PROCEDURE VISIT (OUTPATIENT)
Dept: OBGYN CLINIC | Facility: CLINIC | Age: 74
End: 2024-07-12
Payer: MEDICARE

## 2024-07-12 VITALS
HEART RATE: 74 BPM | TEMPERATURE: 97.2 F | OXYGEN SATURATION: 96 % | WEIGHT: 186 LBS | BODY MASS INDEX: 31.76 KG/M2 | SYSTOLIC BLOOD PRESSURE: 144 MMHG | DIASTOLIC BLOOD PRESSURE: 68 MMHG | HEIGHT: 64 IN

## 2024-07-12 DIAGNOSIS — G89.29 CHRONIC PAIN OF LEFT KNEE: Primary | ICD-10-CM

## 2024-07-12 DIAGNOSIS — M25.562 CHRONIC PAIN OF LEFT KNEE: Primary | ICD-10-CM

## 2024-07-12 DIAGNOSIS — M17.12 PRIMARY OSTEOARTHRITIS OF LEFT KNEE: ICD-10-CM

## 2024-07-12 PROCEDURE — 20610 DRAIN/INJ JOINT/BURSA W/O US: CPT | Performed by: STUDENT IN AN ORGANIZED HEALTH CARE EDUCATION/TRAINING PROGRAM

## 2024-07-12 NOTE — PATIENT INSTRUCTIONS
Patient Education     Viscosupplementation   Why is this procedure done?   Your joints are where two bones meet. The ends of the bones are covered with a protective coating of cartilage. This helps them glide smoothly during movement. A fluid also helps the joint move more smoothly. With years of use, the cartilage may begin to wear away. This causes pain in the joints. This procedure is done to relieve the pain. A special fluid is used to help the bones glide more easily. It also acts like a shock absorber. This is most often done on the knee joints.  What will the results be?   Lubricates the joint  Less pain in the joints during movement or weight bearing  Improved joint mobility or movement  What happens before the procedure?   Your doctor may ask you to try other ways to treat osteoarthritis or OA, such as exercise, physical therapy, drugs for pain, applying hot compress, and losing weight.  Talk to your doctor about all the drugs you are taking. Be sure to include all prescription and over-the-counter (OTC) drugs, and herbal supplements. Tell the doctor about any drug allergy. Bring a list of drugs you take with you. Some drugs may interact with the injection.  What happens during the procedure?   Your doctor will clean the skin area where the injection will be given. You will be given a drug called local anesthesia. This will numb your skin and you will not feel any pain.  In some cases, your doctor might use imaging like x-ray or ultrasound to make sure they inject the right spot.  If you have excess fluid in your joint, your doctor may remove the fluid before beginning.  A needle will be used to inject the hyaluronic acid into the joint. Hyaluronic acid is a natural fluid in your body. It lubricates the joint to ease body movements.  The doctor will cover the injection site with a small bandage to prevent infection.  The procedure may only take a couple of minutes.  What happens after the procedure?   You  may feel slight pain, warmth, and swelling around the joint area.  You will be able to go home after the injection.  What care is needed at home?   Ask your doctor what you need to do when you go home. Make sure you ask questions if you do not understand what the doctor says. This way you will know what you need to do.  Place an ice pack or a bag of frozen peas wrapped in a towel over the painful part. Never put ice right on the skin. Do not leave the ice on more than 10 to 15 minutes at a time.  Rest for the first few days after the procedure. Avoid strenuous activities like heavy lifting, jogging, standing for a long time, and hard exercise.  What follow-up care is needed?   Your doctor may ask you to make visits to the office to check on your progress. Be sure to keep these visits. Your doctor may want you to have more injections.  What lifestyle changes are needed?   Ask your doctor about when you can go back to your normal activities like exercise or work.  What problems could happen?   Pain, redness, and swelling around the injection site  Infection of the joint  Fever  Allergic reaction  Itching  Rash  Bruising  Bleeding in the joint  No change in pain after injection  Last Reviewed Date   2021-03-30  Consumer Information Use and Disclaimer   This generalized information is a limited summary of diagnosis, treatment, and/or medication information. It is not meant to be comprehensive and should be used as a tool to help the user understand and/or assess potential diagnostic and treatment options. It does NOT include all information about conditions, treatments, medications, side effects, or risks that may apply to a specific patient. It is not intended to be medical advice or a substitute for the medical advice, diagnosis, or treatment of a health care provider based on the health care provider's examination and assessment of a patient’s specific and unique circumstances. Patients must speak with a health care  provider for complete information about their health, medical questions, and treatment options, including any risks or benefits regarding use of medications. This information does not endorse any treatments or medications as safe, effective, or approved for treating a specific patient. [employer] and its affiliates disclaim any warranty or liability relating to this information or the use thereof. The use of this information is governed by the Terms of Use, available at https://www.woltersServicefuluwer.com/en/know/clinical-effectiveness-terms   Copyright   Copyright © 2024 [employer] and its affiliates and/or licensors. All rights reserved.

## 2024-07-12 NOTE — PROGRESS NOTES
1. Chronic pain of left knee  Large joint arthrocentesis: L knee      2. Primary osteoarthritis of left knee  Large joint arthrocentesis: L knee            Large joint arthrocentesis: L knee  Universal Protocol:  Consent: Verbal consent obtained.  Risks and benefits: risks, benefits and alternatives were discussed  Consent given by: patient  Patient understanding: patient states understanding of the procedure being performed  Site marked: the operative site was marked  Radiology Images displayed and confirmed. If images not available, report reviewed: imaging studies available  Patient identity confirmed: verbally with patient  Supporting Documentation  Indications: pain   Procedure Details  Location: knee - L knee  Preparation: Patient was prepped and draped in the usual sterile fashion  Needle size: 20 G  Ultrasound guidance: no  Approach: anterolateral  Medications administered: 88 mg hyaluronan 88 MG/4ML    Patient tolerance: patient tolerated the procedure well with no immediate complications  Dressing:  Sterile dressing applied        Counseled follow up in 1 month for re-evaluation to determine response of visco injection

## 2024-08-13 ENCOUNTER — OFFICE VISIT (OUTPATIENT)
Dept: OBGYN CLINIC | Facility: CLINIC | Age: 74
End: 2024-08-13
Payer: MEDICARE

## 2024-08-13 VITALS
HEIGHT: 64 IN | WEIGHT: 186 LBS | SYSTOLIC BLOOD PRESSURE: 130 MMHG | DIASTOLIC BLOOD PRESSURE: 80 MMHG | TEMPERATURE: 98 F | HEART RATE: 71 BPM | BODY MASS INDEX: 31.76 KG/M2 | OXYGEN SATURATION: 97 %

## 2024-08-13 DIAGNOSIS — M17.10 PATELLOFEMORAL ARTHRITIS: ICD-10-CM

## 2024-08-13 DIAGNOSIS — G89.29 CHRONIC PAIN OF LEFT KNEE: Primary | ICD-10-CM

## 2024-08-13 DIAGNOSIS — M35.00 SJOGREN'S SYNDROME, WITH UNSPECIFIED ORGAN INVOLVEMENT (HCC): ICD-10-CM

## 2024-08-13 DIAGNOSIS — M25.562 CHRONIC PAIN OF LEFT KNEE: Primary | ICD-10-CM

## 2024-08-13 DIAGNOSIS — Z87.39 HISTORY OF RHEUMATOID ARTHRITIS: ICD-10-CM

## 2024-08-13 PROCEDURE — 99213 OFFICE O/P EST LOW 20 MIN: CPT | Performed by: STUDENT IN AN ORGANIZED HEALTH CARE EDUCATION/TRAINING PROGRAM

## 2024-08-13 NOTE — PROGRESS NOTES
1. Chronic pain of left knee        2. Sjogren's syndrome, with unspecified organ involvement (HCC)  Ambulatory Referral to Rheumatology      3. History of rheumatoid arthritis  Ambulatory Referral to Rheumatology      4. Patellofemoral arthritis          Orders Placed This Encounter   Procedures    Ambulatory Referral to Rheumatology        Imaging Studies (I personally reviewed images in PACS and report):    X-ray left knee 12/8/2023: Mild patellofemoral osteoarthritic changes. No acute osseous abnormalities. No joint effusion.     IMPRESSION:  Chronic left knee pain  Primary osteoarthritis of the left knee  Reportedly only sustained <3 months relief from an intra-articular cortisone injection in the past concurrent with formal physical therapy  Some relief with viscosupplementation injection from last visit    Other factors:  BMI 32  CKD 3  Type 2 diabetes  Reported history of rheumatoid arthritis  Sjogren's syndrome    Plan:  Clinical exam and radiographic imaging reviewed with patient today, with impression as per above.   Patient is reassured based on some relief from the viscosupplementation injection from last visit.  Counseled we can repeat the injection every 6 months as needed in the future.  We can also consider intra-articular cortisone injections in between as well.  Furthermore, patient is asking for any other interventions to reduce the pain further as she still rates her pain about 4/10 with activities.  Counseled that she could get a second opinion from orthopedic surgery.  Alternatively, she could also get a second opinion from rheumatology as well given she does have a reported history of rheumatoid arthritis, Sjogren's syndrome.  Patient states she has not seen rheumatology in years and would be interested in getting a second opinion from them as well and a referral was placed today.  She prefers to hold off referral to orthopedic surgery at this time.  Continue as needed use of compression knee  "sleeve during activities, Tylenol, heat/ice therapy torments on/off.    Return in about 5 months (around 1/13/2025) for F/u with me in 5 months; Follow up with rheumatology for second opinioon.    Portions of the record may have been created with voice recognition software. Occasional wrong word or \"sound a like\" substitutions may have occurred due to the inherent limitations of voice recognition software. Read the chart carefully and recognize, using context, where substitutions have occurred.     CHIEF COMPLAINT:  Chief Complaint   Patient presents with    Left Knee - Pain         HPI:  Kimberly Kowalski is a 74 y.o. female  who presents with her significant other for     Visit 8/13/2024:  Follow-up evaluation of left knee pain/patellofemoral arthritis:  Patient last seen about a month ago when she was given a viscosupplementation injection.  She states there was some relief with this injection and that there is decreased intensity and frequency of pain.  She states rating her pain score about 4/10.  She states it is mainly aggravated when ascending/descending stairs.  However she is able to complete her activities of daily living and sleep at night with less intense and frequent pain.  She continues to use her knee brace and continues to garden daily despite the pain.  She denies any knee swelling, discoloration, giving out.  She states she has maintained the home exercises from formal PT.    She is still interested in if there are other interventions to help alleviate her pain going forward.    Of note, patient does report a history of rheumatoid arthritis carries a history of Sjogren's disease.  She states she saw a rheumatology years ago she was \"released\" as they felt it was in remission.    Visit 6/19/2024 :  Follow-up evaluation of left knee pain:  Patient last seen approximately 3 months ago when she was given an intra-articular cortisone injection of her knee.  She had also been attending physical therapy for " at least 6 weeks  She states she was doing very well with these interventions but that she felt the injection only lasted about a couple months.  She states she has been experiencing worsening pain of her left knee over the past 4 weeks without a precipitating injury.  She does note that she has been much more active and doing yard work 4 weeks ago as well and maybe she thinks she may have been overexerting herself. Denies use of knee brace with activities.  Pain of her left knee is over the medial aspect.  Describes as sharp/aching pain that is aggravated with prolonged walking and ascending/descending stairs.  She reports stiffness of her knee but denies swelling or discoloration.  Reports crepitus of her knee but denies any giving out or locking sensation.  Pain does not wake her up at night.  She currently has been taking Tylenol with limited relief.          Medical, Surgical, Family, and Social History    Past Medical History:   Diagnosis Date    Cervicalgia     Depression     Diabetes mellitus (HCC)     Esophageal reflux     Hyperlipidemia     Hypertension     Essential    Migraine     Nonspecific abnormal electrocardiogram (ECG)     Sciatica     Seizure disorder (HCC)     Sjogren's syndrome (HCC)      Past Surgical History:   Procedure Laterality Date    CERVICAL POLYPECTOMY      CRANIOTOMY       Left frontal craniotomy for resection of tumor-Dr. Jose Luis Murillo [facility]     TUBAL LIGATION       Social History   Social History     Substance and Sexual Activity   Alcohol Use Never     Social History     Substance and Sexual Activity   Drug Use Never     Social History     Tobacco Use   Smoking Status Never    Passive exposure: Never   Smokeless Tobacco Never     Family History   Problem Relation Age of Onset    Aneurysm Mother     Heart disease Father     Lupus Sister     Thyroid cancer Sister         age unknown    No Known Problems Sister     No Known Problems Sister     Diabetes Maternal  "Grandmother     No Known Problems Maternal Grandfather     No Known Problems Paternal Grandmother     No Known Problems Paternal Grandfather     Heart disease Brother     Diabetes Maternal Aunt     No Known Problems Maternal Aunt     No Known Problems Maternal Aunt     No Known Problems Paternal Aunt     Breast cancer Neg Hx     BRCA2 Positive Neg Hx     BRCA1 Positive Neg Hx     Endometrial cancer Neg Hx     Colon cancer Neg Hx     Ovarian cancer Neg Hx     BRCA1 Negative Neg Hx     BRCA2 Negative Neg Hx     Breast cancer additional onset Neg Hx     BRCA 1/2 Neg Hx      Allergies   Allergen Reactions    Lamotrigine      Other reaction(s): LAMOTRIGINE (LAMICTAL) rash          Physical Exam  /80 (BP Location: Left arm)   Pulse 71   Temp 98 °F (36.7 °C)   Ht 5' 4\" (1.626 m)   Wt 84.4 kg (186 lb)   SpO2 97%   BMI 31.93 kg/m²     Constitutional:  see vital signs  Gen: BMI 32, normocephalic/atraumatic, well-groomed  Eyes: No inflammation or discharge of conjunctiva or lids; sclera clear   Pulmonary/Chest: Effort normal. No respiratory distress.     Ortho Exam  Left Knee Exam:  Erythema: no  Swelling: no  Increased Warmth: no  Tenderness: Denies  ROM: 0-130  Knee flexion strength: 5/5  Knee extension strength: 5/5  Patellar Grind: negative  Varus laxity: negative  Valgus laxity: negative  [facility]: negative     No calf tenderness to palpation       Procedures        "

## 2024-08-16 ENCOUNTER — HOSPITAL ENCOUNTER (OUTPATIENT)
Dept: RADIOLOGY | Facility: CLINIC | Age: 74
End: 2024-08-16
Payer: MEDICARE

## 2024-08-16 VITALS — BODY MASS INDEX: 31.76 KG/M2 | HEIGHT: 64 IN | WEIGHT: 186 LBS

## 2024-08-16 DIAGNOSIS — Z12.31 ENCOUNTER FOR SCREENING MAMMOGRAM FOR BREAST CANCER: ICD-10-CM

## 2024-08-16 DIAGNOSIS — Z78.0 OSTEOPENIA AFTER MENOPAUSE: ICD-10-CM

## 2024-08-16 DIAGNOSIS — Z78.0 MENOPAUSE: ICD-10-CM

## 2024-08-16 DIAGNOSIS — M85.80 OSTEOPENIA AFTER MENOPAUSE: ICD-10-CM

## 2024-08-16 PROCEDURE — 77067 SCR MAMMO BI INCL CAD: CPT

## 2024-08-16 PROCEDURE — 77063 BREAST TOMOSYNTHESIS BI: CPT

## 2024-08-20 ENCOUNTER — OFFICE VISIT (OUTPATIENT)
Dept: FAMILY MEDICINE CLINIC | Facility: CLINIC | Age: 74
End: 2024-08-20
Payer: MEDICARE

## 2024-08-20 VITALS
WEIGHT: 188.8 LBS | DIASTOLIC BLOOD PRESSURE: 63 MMHG | SYSTOLIC BLOOD PRESSURE: 139 MMHG | OXYGEN SATURATION: 93 % | HEART RATE: 75 BPM | BODY MASS INDEX: 32.23 KG/M2 | HEIGHT: 64 IN

## 2024-08-20 DIAGNOSIS — N18.30 STAGE 3 CHRONIC KIDNEY DISEASE, UNSPECIFIED WHETHER STAGE 3A OR 3B CKD (HCC): ICD-10-CM

## 2024-08-20 DIAGNOSIS — I10 PRIMARY HYPERTENSION: ICD-10-CM

## 2024-08-20 DIAGNOSIS — B02.29 POST HERPETIC NEURALGIA: ICD-10-CM

## 2024-08-20 DIAGNOSIS — F33.0 MILD EPISODE OF RECURRENT MAJOR DEPRESSIVE DISORDER (HCC): ICD-10-CM

## 2024-08-20 DIAGNOSIS — D43.2 GANGLIOGLIOMA OF BRAIN (HCC): ICD-10-CM

## 2024-08-20 DIAGNOSIS — E11.65 TYPE 2 DIABETES MELLITUS WITH HYPERGLYCEMIA, WITHOUT LONG-TERM CURRENT USE OF INSULIN (HCC): Primary | ICD-10-CM

## 2024-08-20 DIAGNOSIS — Z00.00 MEDICARE ANNUAL WELLNESS VISIT, SUBSEQUENT: ICD-10-CM

## 2024-08-20 DIAGNOSIS — G40.909 SEIZURE DISORDER (HCC): ICD-10-CM

## 2024-08-20 LAB — SL AMB POCT HEMOGLOBIN AIC: 8.2 (ref ?–6.5)

## 2024-08-20 PROCEDURE — 99213 OFFICE O/P EST LOW 20 MIN: CPT | Performed by: NURSE PRACTITIONER

## 2024-08-20 PROCEDURE — 83036 HEMOGLOBIN GLYCOSYLATED A1C: CPT | Performed by: NURSE PRACTITIONER

## 2024-08-20 PROCEDURE — G0439 PPPS, SUBSEQ VISIT: HCPCS | Performed by: NURSE PRACTITIONER

## 2024-08-20 RX ORDER — LIDOCAINE/PRILOCAINE 2.5 %-2.5%
CREAM (GRAM) TOPICAL
Qty: 30 G | Refills: 1 | Status: SHIPPED | OUTPATIENT
Start: 2024-08-20

## 2024-08-20 NOTE — ASSESSMENT & PLAN NOTE
Lab Results   Component Value Date    HGBA1C 8.2 (A) 08/20/2024   Discussed recent diet changes - she has been eating large frosty's several times a week.  Will downsize them.

## 2024-08-20 NOTE — PROGRESS NOTES
Ambulatory Visit  Name: Kimberly Kowalski      : 1950      MRN: [id number]  Encounter Provider: JENI Davis  Encounter Date: 2024   Encounter department: [facility] PRIMARY CARE    Assessment & Plan   1. Type 2 diabetes mellitus with hyperglycemia, without long-term current use of insulin (HCC)  Assessment & Plan:    Lab Results   Component Value Date    HGBA1C 8.2 (A) 2024   Discussed recent diet changes - she has been eating large frosty's several times a week.  Will downsize them.    Orders:  -     POCT hemoglobin A1c  2. Medicare annual wellness visit, subsequent  3. 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HCC)  4. Stage 3 chronic kidney disease, unspecified whether stage 3a or 3b CKD (HCC)  5. Mild episode of recurrent major depressive disorder (HCC)  Assessment & Plan:  Controlled.    6. Seizure disorder ([facility])  7. Post herpetic neuralgia  Assessment & Plan:  She may increase her gabapentin to 600 mg at bed time.  Will also use emla cream to area above her right eye at bed time.    Orders:  -     lidocaine-prilocaine (EMLA) cream; Apply topically daily at bedtime as needed for mild pain  8. Primary hypertension  Assessment & Plan:  Remains in range today.        Depression Screening and Follow-up Plan: Patient was screened for depression during today's encounter. They screened negative with a PHQ-9 score of 0.      Preventive health issues were discussed with patient, and age appropriate screening tests were ordered as noted in patient's After Visit Summary. Personalized health advice and appropriate referrals for health education or preventive services given if needed, as noted in patient's After Visit Summary.    History of Present Illness     Here today for her medicare wellness - reports no new issues.  States her post herpetic pain above her right eye has gotten worse at night.  She currently takes 300 mg of neurontin TID.  Has relief of pain during the day but not at night.          Patient Care Team:  JENI Davis as PCP - General (Family Medicine)    Review of Systems   Constitutional: Negative.    HENT: Negative.     Respiratory: Negative.     Cardiovascular: Negative.    Gastrointestinal: Negative.    Neurological: Negative.    All other systems reviewed and are negative.    Medical History Reviewed by provider this encounter:       Annual Wellness Visit Questionnaire       Health Risk Assessment:   Patient rates overall health as very good. Patient feels that their physical health rating is much better. Patient is very satisfied with their life. Eyesight was rated as same. Hearing was rated as same. Patient feels that their emotional and mental health rating is much better. Patients states they are never, rarely angry. Patient states they are sometimes unusually tired/fatigued. Pain experienced in the last 7 days has been none. Patient states that she has experienced no weight loss or gain in last 6 months.     Depression Screening:   PHQ-9 Score: 0      Fall Risk Screening:   In the past year, patient has experienced: no history of falling in past year      Urinary Incontinence Screening:   Patient has not leaked urine accidently in the last six months.     Home Safety:  Patient does not have trouble with stairs inside or outside of their home. Patient has working smoke alarms and has working carbon monoxide detector. Home safety hazards include: none.     Nutrition:   Current diet is Regular and Diabetic.     Medications:   Patient is not currently taking any over-the-counter supplements. Patient is able to manage medications.     Activities of Daily Living (ADLs)/Instrumental Activities of Daily Living (IADLs):   Walk and transfer into and out of bed and chair?: Yes  Dress and groom yourself?: Yes    Bathe or shower yourself?: Yes    Feed yourself? Yes  Do your laundry/housekeeping?: Yes  Manage your money, pay your bills and track your expenses?: Yes  Make your own  meals?: Yes    Do your own shopping?: Yes    Previous Hospitalizations:   Any hospitalizations or ED visits within the last 12 months?: Yes    How many hospitalizations have you had in the last year?: 1-2    Advance Care Planning:   Living will: Yes    Durable POA for healthcare: Yes    Advanced directive: Yes    Advanced directive counseling given: No    Five wishes given: No    Patient declined ACP directive: No    End of Life Decisions reviewed with patient: No    Provider agrees with end of life decisions: No      Cognitive Screening:   Provider or family/friend/caregiver concerned regarding cognition?: No    PREVENTIVE SCREENINGS      Cardiovascular Screening:    General: Screening Not Indicated and History Lipid Disorder      Diabetes Screening:     General: Screening Not Indicated and History Diabetes      Colorectal Cancer Screening:     General: Screening Current      Breast Cancer Screening:     General: Screening Current      Cervical Cancer Screening:    General: Screening Not Indicated      Osteoporosis Screening:    General: Screening Current      Abdominal Aortic Aneurysm (AAA) Screening:        General: Screening Not Indicated      Lung Cancer Screening:     General: Screening Not Indicated      Hepatitis C Screening:    General: Screening Current    Screening, Brief Intervention, and Referral to Treatment (SBIRT)    Screening  Typical number of drinks in a day: 0  Typical number of drinks in a week: 0  Interpretation: Low risk drinking behavior.    Single Item Drug Screening:  How often have you used an illegal drug (including marijuana) or a prescription medication for non-medical reasons in the past year? never    Single Item Drug Screen Score: 0  Interpretation: Negative screen for possible drug use disorder    Social Determinants of Health     Financial Resource Strain: Low Risk  (7/24/2023)    Overall Financial Resource Strain (CARDIA)     Difficulty of Paying Living Expenses: Not hard at all  "  Food Insecurity: No Food Insecurity (8/20/2024)    Hunger Vital Sign     Worried About Running Out of Food in the Last Year: Never true     Ran Out of Food in the Last Year: Never true   Transportation Needs: No Transportation Needs (8/20/2024)    PRAPARE - Transportation     Lack of Transportation (Medical): No     Lack of Transportation (Non-Medical): No   Housing Stability: Low Risk  (8/20/2024)    Housing Stability Vital Sign     Unable to Pay for Housing in the Last Year: No     Number of Times Moved in the Last Year: 1     Homeless in the Last Year: No   Utilities: Not At Risk (8/20/2024)    [facility] Utilities     Threatened with loss of utilities: No     No results found.    Objective     /63 (BP Location: Left arm, Patient Position: Sitting, Cuff Size: Large)   Pulse 75   Ht 5' 4\" (1.626 m)   Wt 85.6 kg (188 lb 12.8 oz)   SpO2 93%   BMI 32.41 kg/m²     Physical Exam  Cardiovascular:      Rate and Rhythm: Normal rate and regular rhythm.      Heart sounds: Normal heart sounds.   Pulmonary:      Effort: Pulmonary effort is normal.      Breath sounds: Normal breath sounds.   Neurological:      Mental Status: She is alert and oriented to person, place, and time.           "

## 2024-08-20 NOTE — ASSESSMENT & PLAN NOTE
She may increase her gabapentin to 600 mg at bed time.  Will also use emla cream to area above her right eye at bed time.

## 2024-10-07 ENCOUNTER — HOSPITAL ENCOUNTER (OUTPATIENT)
Dept: RADIOLOGY | Facility: CLINIC | Age: 74
Discharge: HOME | End: 2024-10-07
Payer: MEDICARE

## 2024-10-07 PROCEDURE — 77080 DXA BONE DENSITY AXIAL: CPT

## 2024-10-24 ENCOUNTER — APPOINTMENT (OUTPATIENT)
Dept: LAB | Facility: CLINIC | Age: 74
End: 2024-10-24
Payer: MEDICARE

## 2024-10-24 ENCOUNTER — CONSULT (OUTPATIENT)
Dept: RHEUMATOLOGY | Facility: CLINIC | Age: 74
End: 2024-10-24
Payer: MEDICARE

## 2024-10-24 VITALS
HEIGHT: 64 IN | BODY MASS INDEX: 31.76 KG/M2 | WEIGHT: 186 LBS | OXYGEN SATURATION: 98 % | DIASTOLIC BLOOD PRESSURE: 70 MMHG | SYSTOLIC BLOOD PRESSURE: 132 MMHG | HEART RATE: 71 BPM

## 2024-10-24 DIAGNOSIS — Z11.59 ENCOUNTER FOR SCREENING FOR OTHER VIRAL DISEASES: ICD-10-CM

## 2024-10-24 DIAGNOSIS — Z87.39 HISTORY OF RHEUMATOID ARTHRITIS: ICD-10-CM

## 2024-10-24 DIAGNOSIS — M35.00 SJOGREN'S SYNDROME, WITH UNSPECIFIED ORGAN INVOLVEMENT (HCC): ICD-10-CM

## 2024-10-24 DIAGNOSIS — M17.0 PRIMARY OSTEOARTHRITIS OF BOTH KNEES: Primary | ICD-10-CM

## 2024-10-24 DIAGNOSIS — M17.0 PRIMARY OSTEOARTHRITIS OF BOTH KNEES: ICD-10-CM

## 2024-10-24 LAB
C3 SERPL-MCNC: 163 MG/DL (ref 87–200)
C4 SERPL-MCNC: 31 MG/DL (ref 19–52)
HBV CORE AB SER QL: NORMAL
HBV CORE IGM SER QL: NORMAL
HBV SURFACE AG SER QL: NORMAL
HCV AB SER QL: NORMAL

## 2024-10-24 PROCEDURE — 20610 DRAIN/INJ JOINT/BURSA W/O US: CPT | Performed by: INTERNAL MEDICINE

## 2024-10-24 PROCEDURE — 86705 HEP B CORE ANTIBODY IGM: CPT

## 2024-10-24 PROCEDURE — 99205 OFFICE O/P NEW HI 60 MIN: CPT | Performed by: INTERNAL MEDICINE

## 2024-10-24 PROCEDURE — 86235 NUCLEAR ANTIGEN ANTIBODY: CPT

## 2024-10-24 PROCEDURE — 86038 ANTINUCLEAR ANTIBODIES: CPT

## 2024-10-24 PROCEDURE — 86803 HEPATITIS C AB TEST: CPT

## 2024-10-24 PROCEDURE — 86200 CCP ANTIBODY: CPT

## 2024-10-24 PROCEDURE — 87340 HEPATITIS B SURFACE AG IA: CPT

## 2024-10-24 PROCEDURE — 36415 COLL VENOUS BLD VENIPUNCTURE: CPT

## 2024-10-24 PROCEDURE — 86704 HEP B CORE ANTIBODY TOTAL: CPT

## 2024-10-24 PROCEDURE — 86431 RHEUMATOID FACTOR QUANT: CPT

## 2024-10-24 PROCEDURE — 86160 COMPLEMENT ANTIGEN: CPT

## 2024-10-24 PROCEDURE — 86430 RHEUMATOID FACTOR TEST QUAL: CPT

## 2024-10-24 PROCEDURE — 86225 DNA ANTIBODY NATIVE: CPT

## 2024-10-24 RX ORDER — LEVETIRACETAM 500 MG/1
TABLET ORAL
COMMUNITY
Start: 2024-10-01

## 2024-10-24 RX ORDER — METHYLPREDNISOLONE ACETATE 40 MG/ML
2 INJECTION, SUSPENSION INTRA-ARTICULAR; INTRALESIONAL; INTRAMUSCULAR; SOFT TISSUE
Status: COMPLETED | OUTPATIENT
Start: 2024-10-24 | End: 2024-10-24

## 2024-10-24 RX ORDER — HYDROXYCHLOROQUINE SULFATE 200 MG/1
400 TABLET, FILM COATED ORAL
Qty: 180 TABLET | Refills: 3 | Status: SHIPPED | OUTPATIENT
Start: 2024-10-24 | End: 2025-10-24

## 2024-10-24 RX ORDER — LIDOCAINE HYDROCHLORIDE 10 MG/ML
1 INJECTION, SOLUTION INFILTRATION; PERINEURAL
Status: COMPLETED | OUTPATIENT
Start: 2024-10-24 | End: 2024-10-24

## 2024-10-24 RX ADMIN — METHYLPREDNISOLONE ACETATE 2 ML: 40 INJECTION, SUSPENSION INTRA-ARTICULAR; INTRALESIONAL; INTRAMUSCULAR; SOFT TISSUE at 09:15

## 2024-10-24 RX ADMIN — LIDOCAINE HYDROCHLORIDE 1 ML: 10 INJECTION, SOLUTION INFILTRATION; PERINEURAL at 09:15

## 2024-10-24 NOTE — PROGRESS NOTES
"  This is a Rheumatology Consult seen at the request of [employer]      HPI: Kimberly Kowalski is a 73 y/o female who presents for further evaluation osteoarthritis knees, Sjogren's syndrome. She has past medical history type 2 diabetes, post-herpetic neuralgia, HTN, depression, seizure, ganglioglioma brain s/p surgery    She was dx with Sjogren's syndrome and rheumatoid arthritis > 20 years ago at [place].     No records available today,    Her DMARDs were stopped and she was told she was in remission    Chronic dry eyes and dry mouth    Other than left knee \" I have no pain\"    Had visco injection per Ortho which lasted a couple months    She would like to have steroid injection done today      --------------------------------------------------------------------------------------------------------        ROS:        - for: Fevers, Chills or sweats.  No HAs or scalp tenderness.  No jaw claudication.  No acute visual or eye changes.  No dry eyes.  No auditory complaints.  No oral lesions or ulcers.  No dry mouth.  No sore throat or cough.  No chest pains or palpitations.  No shortness of breath, dyspnea on exertion or wheezing.  No hemotpysis.  No abdominal pain, GERD symptoms, diarrhea or constipation.  No urinary complaints.  No numbness, tingling or weakness.  No rashes.    All other ROS was reviewed and negative except as above         --------------------------------------------------------------------------------------------------------    Past Medical History    Past Medical History:   Diagnosis Date    Cervicalgia     Depression     Diabetes mellitus (HCC)     Esophageal reflux     Hyperlipidemia     Hypertension     Essential    Migraine     Nonspecific abnormal electrocardiogram (ECG)     Sciatica     Seizure disorder (HCC)     Sjogren's syndrome (HCC)            Past Surgical History    Past Surgical History:   Procedure Laterality Date    CERVICAL POLYPECTOMY      CRANIOTOMY       Left " frontal craniotomy for resection of tumor-[facility]     TUBAL LIGATION             Family History    Family History   Problem Relation Age of Onset    Aneurysm Mother     Heart disease Father     Lupus Sister     Thyroid cancer Sister         age unknown    No Known Problems Sister     No Known Problems Sister     Diabetes Maternal Grandmother     No Known Problems Maternal Grandfather     No Known Problems Paternal Grandmother     No Known Problems Paternal Grandfather     Heart disease Brother     Diabetes Maternal Aunt     No Known Problems Maternal Aunt     No Known Problems Maternal Aunt     No Known Problems Paternal Aunt     Breast cancer Neg Hx     BRCA2 Positive Neg Hx     BRCA1 Positive Neg Hx     Endometrial cancer Neg Hx     Colon cancer Neg Hx     Ovarian cancer Neg Hx     BRCA1 Negative Neg Hx     BRCA2 Negative Neg Hx     Breast cancer additional onset Neg Hx     BRCA 1/2 Neg Hx             Social History    Social History     Tobacco Use    Smoking status: Never     Passive exposure: Never    Smokeless tobacco: Never   Vaping Use    Vaping status: Never Used   Substance Use Topics    Alcohol use: Never    Drug use: Never         Allergies    Allergies   Allergen Reactions    Lamotrigine      Other reaction(s): LAMOTRIGINE (LAMICTAL) rash         Medications    Current Outpatient Medications   Medication Instructions    amLODIPine (NORVASC) 5 mg, Oral, Daily    ASPIRIN 81 PO 81 mg, Oral, Daily    atorvastatin (LIPITOR) 20 mg, Oral, Daily at bedtime    calcium citrate-vitamin D 315 mg-5 mcg tablet 1 tablet, Oral, 2 times daily    cholecalciferol (VITAMIN D3) 1,000 Units, Oral, Daily    escitalopram (LEXAPRO) 20 mg tablet No dose, route, or frequency recorded.    gabapentin (NEURONTIN) 300 mg, Oral, 3 times daily    lansoprazole (PREVACID) 15 mg, Oral, Daily    levETIRAcetam (KEPPRA) 500 mg tablet     levETIRAcetam (KEPPRA) 500 mg, 2 times daily    lidocaine-prilocaine  "(EMLA) cream Topical, Daily at bedtime PRN    metFORMIN (GLUCOPHAGE-XR) 750 mg, Oral, Daily with breakfast    Multiple Vitamins-Minerals (MULTIVITAMIN WITH MINERALS) tablet 1 tablet, Oral, Daily    topiramate (TOPAMAX) 50 MG tablet     topiramate (TOPAMAX) 50 mg, 2 times daily    traZODone (DESYREL) 100 mg, Oral, Daily at bedtime    venlafaxine (EFFEXOR-XR) 75 mg, Oral, Daily          Physical Exam    /70   Pulse 71   Ht 5' 4\" (1.626 m)   Wt 84.4 kg (186 lb)   SpO2 98%   BMI 31.93 kg/m²     GEN: AAO, No apparent distress.  Patient is well developed.  HEENT:  Pupils are equal, round and reactive.  Sclera are clear.  Fundoscopic exam is normal.  External ears are without lesions.  Oral pharynx is clear of ulcers or other lesions.  MMM.   NECK:  Supple.  There is no adenopathy appreciable in anterior or posterior cervical chains or supraclavicularly.  JVP is normal.    HEART: Regular rate and rhythm.  There is no appreciable murmur, gallop or rub.  LUNGS: Clear to auscultation.  ABD:  Soft, without tenderness, rebound or guarding.  No appreciable organomegally.  NEURO: Speech and cognition are normal.  Strength is 5/5 throughout.  Tone is normal.  DTRs are 2/4 at the knees, ankles and elbows.  Gait is normal.  SKIN: There are no rashes or lesions    MUSCULOSKELETAL:   + suze OA      ________________________________________________________________________          Results Review    Component      Latest Ref Rng 12/8/2023   ANTI DNA DOUBLE STRANDED      4 - 10 IU/mL <4    Sjogren's Antibody (SS-A)      Negative  Positive !    Sjogren's Antibody (SS-B)      Negative  Positive !    SM Antibody      Negative  Negative    Ram/RNP Antibodies      Negative  Negative    RNP ANTIBODIES      Negative  Negative    PATRICK Titer 1 Titer of 640    PATRICK Pattern 1 Speckled pattern    RHEUMATOID FACTOR      Negative  Negative    PATRICK SCREEN      Negative  Positive !    C-REACTIVE PROTEIN      <3.0 mg/L <1.0       Legend:  ! " Abnormal      Impressions     Osteoarthritis  OA knees  H/o Sjogren's syndrome  H/o rheumatoid arthritis       Plans:    Kimberly Kowalski is a 73 y/o female with clinical s/s osteoarthritis     I have reviewed x-rays left knee c/w osteoarthritis    She had visco injection per Ortho which provided relief for a couple months    She would like steroid injection today    She was dx with Sjogren's syndrome and rheumatoid arthritis > 20 years ago at [place].     + sicca sx    She was told in past she was in remission    Resume     Check serologies and baseline labs (+ PATRICK noted)    RTC 6 months      Thank you for involving me in this patient's care.        Dariel Sosa MD  [facility] Rheumatology    I have spent a total time of 62 minutes in caring for this patient on the day of the visit/encounter including Diagnostic results, Risk factor reductions, Impressions, Counseling / Coordination of care, Documenting in the medical record, Reviewing / ordering tests, medicine, procedures  , and Obtaining or reviewing history  .        Large joint arthrocentesis: L knee  Universal Protocol:  procedure performed by consultantConsent: Verbal consent obtained. Written consent not obtained.  Risks and benefits: risks, benefits and alternatives were discussed  Consent given by: patient  Timeout called at: 10/24/2024 9:34 AM.  Patient understanding: patient states understanding of the procedure being performed  Patient consent: the patient's understanding of the procedure matches consent given  Procedure consent: procedure consent matches procedure scheduled  Relevant documents: relevant documents present and verified  Test results: test results available and properly labeled  Site marked: the operative site was marked  Radiology Images displayed and confirmed. If images not available, report reviewed: imaging studies available  Patient identity confirmed: verbally with patient  Supporting Documentation  Indications: pain    Procedure Details  Location: knee - L knee  Needle size: 25 G  Ultrasound guidance: no  Approach: medial  Medications administered: 1 mL lidocaine 1 %; 2 mL methylPREDNISolone acetate 40 mg/mL    Patient tolerance: patient tolerated the procedure well with no immediate complications

## 2024-10-25 LAB
CCP AB SER IA-ACNC: 0.5
CRYOGLOB RF SER-ACNC: ABNORMAL [IU]/ML
RHEUMATOID FACT SER QL LA: POSITIVE

## 2024-10-26 LAB
ANA SPECKLED TITR SER: ABNORMAL {TITER}
ANA TITR SER IF: POSITIVE {TITER}
DSDNA AB SER-ACNC: <1 IU/ML (ref 0–9)
ENA RNP AB SER-ACNC: <0.2 AI (ref 0–0.9)
ENA SCL70 AB SER-ACNC: <0.2 AI (ref 0–0.9)
ENA SM AB SER-ACNC: <0.2 AI (ref 0–0.9)
ENA SS-A AB SER-ACNC: >8 AI (ref 0–0.9)
ENA SS-B AB SER-ACNC: >8 AI (ref 0–0.9)
SL AMB NOTE:: ABNORMAL

## 2024-10-28 ENCOUNTER — TELEPHONE (OUTPATIENT)
Age: 74
End: 2024-10-28

## 2024-10-28 NOTE — TELEPHONE ENCOUNTER
Patient's  calling asking if his wife is due back in 3 months for an injection in her knee, she has a 6 month follow up scheduled, but thought she needed another injection in 3 months.    Patient also has questions regarding a different drug for hydroxychloroquine ,due to change in insurance in January

## 2024-10-30 ENCOUNTER — TELEPHONE (OUTPATIENT)
Age: 74
End: 2024-10-30

## 2024-10-30 DIAGNOSIS — M35.00 SJOGREN'S SYNDROME, WITH UNSPECIFIED ORGAN INVOLVEMENT (HCC): Primary | ICD-10-CM

## 2024-10-30 RX ORDER — PILOCARPINE HYDROCHLORIDE 5 MG/1
5 TABLET, FILM COATED ORAL 2 TIMES DAILY
Qty: 180 TABLET | Refills: 3 | Status: SHIPPED | OUTPATIENT
Start: 2024-10-30

## 2024-10-30 NOTE — TELEPHONE ENCOUNTER
[facility], prescription was cancelled.   Called Kimberly and explained to her the potential interaction side effect of QT prolongation with Plaquenil + Trazadone and Lexapro.  Pt is asking for you to call her to discuss other medications she can go on since that one is no longer an option.

## 2024-10-30 NOTE — TELEPHONE ENCOUNTER
Trazadone and escitalopram    Incoming call by Geraldine with [facility]:    Would like to let Dr. Sosa know that there is a potential interaction side effect of QT prolongation with Plaquenil + Trazadone and Lexapro.  Requesting a call back to ensure they are ok to proceed with medication. Routing.       Geraldine: [phone]  Invoice- [phone]

## 2024-10-30 NOTE — TELEPHONE ENCOUNTER
Pls inform pharmacy and cancel Rx for now    Pls inform patient we can cancelling for now due to interaction with her other medications    Thank you

## 2024-12-09 ENCOUNTER — OFFICE VISIT (OUTPATIENT)
Dept: FAMILY MEDICINE CLINIC | Facility: CLINIC | Age: 74
End: 2024-12-09
Payer: MEDICARE

## 2024-12-09 VITALS
HEART RATE: 85 BPM | HEIGHT: 64 IN | WEIGHT: 184.2 LBS | SYSTOLIC BLOOD PRESSURE: 134 MMHG | BODY MASS INDEX: 31.45 KG/M2 | OXYGEN SATURATION: 96 % | DIASTOLIC BLOOD PRESSURE: 66 MMHG

## 2024-12-09 DIAGNOSIS — R05.1 ACUTE COUGH: ICD-10-CM

## 2024-12-09 DIAGNOSIS — M32.9 SYSTEMIC LUPUS ERYTHEMATOSUS, UNSPECIFIED SLE TYPE, UNSPECIFIED ORGAN INVOLVEMENT STATUS (HCC): ICD-10-CM

## 2024-12-09 DIAGNOSIS — R09.89 CHEST CONGESTION: ICD-10-CM
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
## 2024-12-09 DIAGNOSIS — E11.65 TYPE 2 DIABETES MELLITUS WITH HYPERGLYCEMIA, WITHOUT LONG-TERM CURRENT USE OF INSULIN (HCC): Primary | ICD-10-CM

## 2024-12-09 DIAGNOSIS — U07.1 COVID-19: ICD-10-CM

## 2024-12-09 DIAGNOSIS — R53.1 WEAKNESS: ICD-10-CM

## 2024-12-09 LAB — SL AMB POCT HEMOGLOBIN AIC: 7.6 (ref ?–6.5)

## 2024-12-09 PROCEDURE — 99214 OFFICE O/P EST MOD 30 MIN: CPT | Performed by: NURSE PRACTITIONER

## 2024-12-09 PROCEDURE — 87811 SARS-COV-2 COVID19 W/OPTIC: CPT | Performed by: NURSE PRACTITIONER

## 2024-12-09 PROCEDURE — 87804 INFLUENZA ASSAY W/OPTIC: CPT | Performed by: NURSE PRACTITIONER

## 2024-12-09 PROCEDURE — 83036 HEMOGLOBIN GLYCOSYLATED A1C: CPT | Performed by: NURSE PRACTITIONER

## 2024-12-09 RX ORDER — NIRMATRELVIR AND RITONAVIR 300-100 MG
3 KIT ORAL 2 TIMES DAILY
Qty: 30 TABLET | Refills: 0 | Status: SHIPPED | OUTPATIENT
Start: 2024-12-09 | End: 2024-12-14

## 2024-12-09 NOTE — PROGRESS NOTES
COVID-19 Outpatient Progress Note  Name: Kimberly Kowalski      : 1950      MRN: [id number]  Encounter Provider: JENI Davis  Encounter Date: 2024   Encounter department: [facility] PRIMARY CARE    Assessment & Plan  Type 2 diabetes mellitus with hyperglycemia, without long-term current use of insulin (HCC)    Lab Results   Component Value Date    HGBA1C 7.6 (A) 2024     Orders:  •  POCT hemoglobin A1c    COVID-19    Orders:  •  Poct Covid 19 Rapid Antigen Test  •  nirmatrelvir & ritonavir (Paxlovid, 300/100,) tablet therapy pack; Take 3 tablets by mouth 2 (two) times a day for 5 days Take 2 nirmatrelvir tablets + 1 ritonavir tablet together per dose    Acute cough    Orders:  •  POCT rapid flu A and B    Weakness    Orders:  •  POCT rapid flu A and B    Chest congestion    Orders:  •  POCT rapid flu A and B    Systemic lupus erythematosus, unspecified SLE type, unspecified organ involvement status (HCC)  No acute issues       Fall, initial encounter           Disposition:     Rapid antigen testing was performed and the result is POSITIVE for COVID-19.     Patient meets criteria for Paxlovid and they have been counseled appropriately regarding risks, benefits, side effects, and alternative treatment options. After discussion, patient agrees to treatment.    Possible side effects of Paxlovid?    Possible side effects of Paxlovid are:  - Liver Problems. Notify us right away if you start to experience loss of appetite, yellowing of your skin and the whites of eyes (jaundice), dark-colored urine, pale colored stools and itchy skin, stomach area (abdominal) pain.  - Resistance to HIV Medicines. If you have untreated HIV infection, Paxlovid may lead to some HIV medicines not working as well in the future.  - Other possible side effects include: altered sense of taste, diarrhea, high blood pressure, or muscle aches.    I have spent a total time of 30 minutes on the day of the  encounter for this patient including discussing prognosis, instructions for management, patient and family education, importance of treatment compliance, impressions and documenting in the medical record.          Encounter provider: JENI Davis     Provider located at: [facility]  [address] [facility] [address]     Recent Visits  Date Type Provider Dept   12/09/24 Office Visit JENI Davis [facility] Primary Care   Showing recent visits within past 7 days and meeting all other requirements  Future Appointments  No visits were found meeting these conditions.  Showing future appointments within next 150 days and meeting all other requirements    History of Present Illness      Subjective:   Kimberly Kowalski is a 74 y.o. female who is concerned about COVID-19. Patient's symptoms include fatigue, malaise, nasal congestion, cough and shortness of breath.     - Date of symptom onset: 12/8/2024      COVID-19 vaccination status: Fully vaccinated with booster    Exposure:   Contact with a person who is under investigation (PUI) for or who is positive for COVID-19 within the last 14 days?: No    Hospitalized recently for fever and/or lower respiratory symptoms?: No      Currently a healthcare worker that is involved in direct patient care?: No      Works in a special setting where the risk of COVID-19 transmission may be high? (this may include long-term care, correctional and senior care facilities; homeless shelters; assisted-living facilities and group homes.): No      Resident in a special setting where the risk of COVID-19 transmission may be high? (this may include long-term care, correctional and senior care facilities; homeless shelters; assisted-living facilities and group homes.): No      Also fell tying to get into their truck at home - landed on bottom with right leg under her and left leg straight out and under truck. Reports tenderness to bottom and some pain in  "knees but no acute injury.  Difficulty with ambulation is due to weakness from illness.  She is still able to ambulate independently though.     She also fell forward at home earlier in the am reaching to unplug their cristina tree lights.     Lab Results   Component Value Date    SARSCOVAG Positive (A) 12/10/2024       Review of Systems   Constitutional:  Positive for fatigue.   HENT:  Positive for congestion.    Respiratory:  Positive for cough and shortness of breath.      Objective   /66 (BP Location: Left arm, Patient Position: Sitting, Cuff Size: Large)   Pulse 85   Ht 5' 4\" (1.626 m)   Wt 83.6 kg (184 lb 3.2 oz)   SpO2 96%   BMI 31.62 kg/m²     Physical Exam  Pulmonary:      Effort: Pulmonary effort is normal.   Neurological:      Mental Status: She is alert and oriented to person, place, and time.       "

## 2024-12-09 NOTE — ASSESSMENT & PLAN NOTE
Lab Results   Component Value Date    HGBA1C 7.6 (A) 12/09/2024     Orders:  •  POCT hemoglobin A1c

## 2024-12-10 PROBLEM — M32.9 SYSTEMIC LUPUS ERYTHEMATOSUS, UNSPECIFIED SLE TYPE, UNSPECIFIED ORGAN INVOLVEMENT STATUS (HCC): Status: ACTIVE | Noted: 2024-12-10

## 2024-12-10 LAB
SARS-COV-2 AG UPPER RESP QL IA: POSITIVE
SL AMB POCT RAPID FLU A: NORMAL
SL AMB POCT RAPID FLU B: NORMAL
VALID CONTROL: ABNORMAL

## 2024-12-20 ENCOUNTER — OFFICE VISIT (OUTPATIENT)
Dept: FAMILY MEDICINE CLINIC | Facility: CLINIC | Age: 74
End: 2024-12-20
Payer: MEDICARE

## 2024-12-20 VITALS
DIASTOLIC BLOOD PRESSURE: 78 MMHG | WEIGHT: 183 LBS | HEIGHT: 64 IN | SYSTOLIC BLOOD PRESSURE: 130 MMHG | BODY MASS INDEX: 31.24 KG/M2 | OXYGEN SATURATION: 96 % | HEART RATE: 82 BPM

## 2024-12-20 DIAGNOSIS — E11.65 TYPE 2 DIABETES MELLITUS WITH HYPERGLYCEMIA, WITHOUT LONG-TERM CURRENT USE OF INSULIN (HCC): Primary | ICD-10-CM

## 2024-12-20 DIAGNOSIS — R25.1 TREMOR OF BOTH OUTSTRETCHED HANDS: ICD-10-CM

## 2024-12-20 DIAGNOSIS — Z82.0 FAMILY HISTORY OF BENIGN ESSENTIAL TREMOR: ICD-10-CM

## 2024-12-20 LAB
CREAT UR-MCNC: 265.3 MG/DL
MICROALBUMIN UR-MCNC: 57.9 MG/L
MICROALBUMIN/CREAT 24H UR: 22 MG/G CREATININE (ref 0–30)

## 2024-12-20 PROCEDURE — 99214 OFFICE O/P EST MOD 30 MIN: CPT | Performed by: NURSE PRACTITIONER

## 2024-12-20 PROCEDURE — 82570 ASSAY OF URINE CREATININE: CPT | Performed by: NURSE PRACTITIONER

## 2024-12-20 PROCEDURE — 82043 UR ALBUMIN QUANTITATIVE: CPT | Performed by: NURSE PRACTITIONER

## 2024-12-20 PROCEDURE — G2211 COMPLEX E/M VISIT ADD ON: HCPCS | Performed by: NURSE PRACTITIONER

## 2024-12-20 NOTE — PROGRESS NOTES
"Name: Kimberly Kowalski      : 1950      MRN: [id number]  Encounter Provider: JENI Davis  Encounter Date: 2024   Encounter department: [facility] PRIMARY CARE  :  Assessment & Plan  Type 2 diabetes mellitus with hyperglycemia, without long-term current use of insulin (HCC)    Lab Results   Component Value Date    HGBA1C 7.6 (A) 2024     Orders:  •  Albumin / creatinine urine ratio; Future    Family history of benign essential tremor         Tremor of both outstretched hands  She has an upcoming appt with her neurologist, advised her to speak to them first.  If they would like the PCP to further evaluate she is to let me know.  Given her hx of seizures and ganglioma of the brain would recommend they review her recent increase in tremors.                History of Present Illness     Here for a follow-up.  Hx of T2DM, controlled with improving HA1C.  Recent hx of COVID - reports she feels fully recovered today.      Also with a tremor to both upper extremities when reaching for an item or using a hand to eat.  Her brother and nephew both deal with different kinds of tremors.           Review of Systems   Constitutional: Negative.    HENT: Negative.     Respiratory: Negative.     Cardiovascular: Negative.    Neurological:  Positive for tremors. Negative for seizures.   All other systems reviewed and are negative.      Objective   /78 (BP Location: Left arm, Patient Position: Sitting, Cuff Size: Standard)   Pulse 82   Ht 5' 4\" (1.626 m)   Wt 83 kg (183 lb)   SpO2 96%   BMI 31.41 kg/m²      Physical Exam  Constitutional:       Appearance: Normal appearance.   Cardiovascular:      Rate and Rhythm: Normal rate and regular rhythm.   Pulmonary:      Effort: Pulmonary effort is normal.      Breath sounds: Normal breath sounds.   Neurological:      General: No focal deficit present.      Mental Status: She is alert and oriented to person, place, and time.      GCS: GCS eye " subscore is 4. GCS verbal subscore is 5. GCS motor subscore is 6.      Motor: Tremor present. No weakness.      Coordination: Coordination is intact.

## 2024-12-20 NOTE — ASSESSMENT & PLAN NOTE
She has an upcoming appt with her neurologist, advised her to speak to them first.  If they would like the PCP to further evaluate she is to let me know.  Given her hx of seizures and ganglioma of the brain would recommend they review her recent increase in tremors.

## 2024-12-20 NOTE — ASSESSMENT & PLAN NOTE
Lab Results   Component Value Date    HGBA1C 7.6 (A) 12/09/2024     Orders:  •  Albumin / creatinine urine ratio; Future

## 2025-01-14 ENCOUNTER — HOSPITAL ENCOUNTER (OUTPATIENT)
Dept: RADIOLOGY | Facility: CLINIC | Age: 75
Discharge: HOME | End: 2025-01-14
Payer: MEDICARE

## 2025-01-14 ENCOUNTER — OFFICE VISIT (OUTPATIENT)
Dept: OBGYN CLINIC | Facility: CLINIC | Age: 75
End: 2025-01-14
Payer: MEDICARE

## 2025-01-14 VITALS — HEIGHT: 64 IN | WEIGHT: 183 LBS | BODY MASS INDEX: 31.24 KG/M2

## 2025-01-14 DIAGNOSIS — M25.562 ACUTE PAIN OF LEFT KNEE: ICD-10-CM

## 2025-01-14 DIAGNOSIS — M25.562 ACUTE PAIN OF LEFT KNEE: Primary | ICD-10-CM

## 2025-01-14 DIAGNOSIS — M17.12 PRIMARY OSTEOARTHRITIS OF LEFT KNEE: ICD-10-CM

## 2025-01-14 PROCEDURE — 99213 OFFICE O/P EST LOW 20 MIN: CPT | Performed by: STUDENT IN AN ORGANIZED HEALTH CARE EDUCATION/TRAINING PROGRAM

## 2025-01-14 PROCEDURE — 73562 X-RAY EXAM OF KNEE 3: CPT

## 2025-01-14 NOTE — PROGRESS NOTES
1. Acute pain of left knee  XR knee 3 vw left non injury      2. Primary osteoarthritis of left knee  XR knee 3 vw left non injury        Orders Placed This Encounter   Procedures    XR knee 3 vw left non injury        Imaging Studies (I personally reviewed images in PACS and report):    X-ray left knee 1/14/2025: Patellofemoral degenerative changes.  No acute osseous abnormalities.  No joint effusion.  X-ray left knee 12/8/2023: Mild patellofemoral osteoarthritic changes. No acute osseous abnormalities. No joint effusion.     IMPRESSION:  Acute on chronic left knee pain  Primary osteoarthritis of the left knee  Some relief but brief with cortisone injection as well as prior viscosupplementation injection  Since her last visit, patient received a viscosupplementation injection 6 months ago.  She reportedly also saw a rheumatologist who gave a a cortisone injection 3 months ago.  She also states sustaining a new injury of her left knee from twisting/falling down a step 2 days after her cortisone injection from the rheumatologist  Currently patient reports pain is not as severe as it once was but is still aggravated with weightbearing and ascending/descending stairs.    Other factors:  BMI 31  CKD 3  Type 2 diabetes  Reported history of rheumatoid arthritis  Sjogren's syndrome    Plan:  Clinical exam and radiographic imaging reviewed with patient today, with impression as per above.   Repeated imaging as noted above.  I will follow-up official radiology interpretation.  Treatment options were discussed including risk/benefits and patient deferred repeating a CSI or having to order visco at this time as she feels pain is not severe enough to warrant.  Outside of gabapentin, I counseled patient could consider use of acetaminophen.  I did consider recommending oral NSAIDs but patient does carry history of CKD 3.  Patient/significant other were not aware of this diagnosis.  I will message/reach out to patient's PCP to  "determine whether oral NSAIDs are allowable.    No follow-ups on file.    Portions of the record may have been created with voice recognition software. Occasional wrong word or \"sound a like\" substitutions may have occurred due to the inherent limitations of voice recognition software. Read the chart carefully and recognize, using context, where substitutions have occurred.     CHIEF COMPLAINT:  Chief Complaint   Patient presents with    Left Knee - Follow-up         HPI:  Kimberly Kowalski is a 74 y.o. female  who presents with her significant other for     Visit 1/14/2025:  Follow-up evaluation of left knee pain/patellofemoral arthritis:  Of note, patient received a viscosupplementation injection of her left knee about 6 months ago.  Her significant other notes that patient had also seen a rheumatologist about 3 months ago who gave her a cortisone injection of her left knee.  She states with these interventions did provide relief.  However she did sustain a new injury 2 days after her injection.  Reportedly fell down a step and hyperflexed/impacted her anterior knee.  She states there was an exacerbation of pain but that this also started to improve after few days of resting, icing.  Currently today she states her pain scale ranges from 2-5/10.  She states it is worse when ascending/descending stairs.  She states it is over the peripatellar/medial aspect of her knee.  Describes it as sharp/aching pain.  Reports continued crepitus and stiffness of her knee.  In regards to pain control patient notes she takes gabapentin daily but uses for a different issue.  She admits that she tends to forget to use Tylenol or NSAIDs for pain so she is not aware whether or not these provide relief.    Visit 8/13/2024:  Follow-up evaluation of left knee pain/patellofemoral arthritis:  Patient last seen about a month ago when she was given a viscosupplementation injection.  She states there was some relief with this injection and that " "there is decreased intensity and frequency of pain.  She states rating her pain score about 4/10.  She states it is mainly aggravated when ascending/descending stairs.  However she is able to complete her activities of daily living and sleep at night with less intense and frequent pain.  She continues to use her knee brace and continues to garden daily despite the pain.  She denies any knee swelling, discoloration, giving out.  She states she has maintained the home exercises from formal PT.    She is still interested in if there are other interventions to help alleviate her pain going forward.    Of note, patient does report a history of rheumatoid arthritis carries a history of Sjogren's disease.  She states she saw a rheumatology years ago she was \"released\" as they felt it was in remission.    Visit 6/19/2024 :  Follow-up evaluation of left knee pain:  Patient last seen approximately 3 months ago when she was given an intra-articular cortisone injection of her knee.  She had also been attending physical therapy for at least 6 weeks  She states she was doing very well with these interventions but that she felt the injection only lasted about a couple months.  She states she has been experiencing worsening pain of her left knee over the past 4 weeks without a precipitating injury.  She does note that she has been much more active and doing yard work 4 weeks ago as well and maybe she thinks she may have been overexerting herself. Denies use of knee brace with activities.  Pain of her left knee is over the medial aspect.  Describes as sharp/aching pain that is aggravated with prolonged walking and ascending/descending stairs.  She reports stiffness of her knee but denies swelling or discoloration.  Reports crepitus of her knee but denies any giving out or locking sensation.  Pain does not wake her up at night.  She currently has been taking Tylenol with limited relief.          Medical, Surgical, Family, and Social " "History    Past Medical History:   Diagnosis Date    Cervicalgia     Depression     Diabetes mellitus (HCC)     Esophageal reflux     Hyperlipidemia     Hypertension     Essential    Migraine     Nonspecific abnormal electrocardiogram (ECG)     Sciatica     Seizure disorder (HCC)     Sjogren's syndrome (HCC)      Past Surgical History:   Procedure Laterality Date    CERVICAL POLYPECTOMY      CRANIOTOMY       Left frontal craniotomy for resection of tumor-Dr. Jose Luis Murillo [facility]     TUBAL LIGATION       Social History   Social History     Substance and Sexual Activity   Alcohol Use Never     Social History     Substance and Sexual Activity   Drug Use Never     Social History     Tobacco Use   Smoking Status Never    Passive exposure: Never   Smokeless Tobacco Never     Family History   Problem Relation Age of Onset    Aneurysm Mother     Heart disease Father     Lupus Sister     Thyroid cancer Sister         age unknown    No Known Problems Sister     No Known Problems Sister     Diabetes Maternal Grandmother     No Known Problems Maternal Grandfather     No Known Problems Paternal Grandmother     No Known Problems Paternal Grandfather     Heart disease Brother     Diabetes Maternal Aunt     No Known Problems Maternal Aunt     No Known Problems Maternal Aunt     No Known Problems Paternal Aunt     Breast cancer Neg Hx     BRCA2 Positive Neg Hx     BRCA1 Positive Neg Hx     Endometrial cancer Neg Hx     Colon cancer Neg Hx     Ovarian cancer Neg Hx     BRCA1 Negative Neg Hx     BRCA2 Negative Neg Hx     Breast cancer additional onset Neg Hx     BRCA 1/2 Neg Hx      Allergies   Allergen Reactions    Lamotrigine      Other reaction(s): LAMOTRIGINE (LAMICTAL) rash          Physical Exam  Ht 5' 4\" (1.626 m)   Wt 83 kg (183 lb)   BMI 31.41 kg/m²     Constitutional:  see vital signs  Gen: BMI 31, normocephalic/atraumatic, well-groomed  Eyes: No inflammation or discharge of conjunctiva or lids; sclera clear "   Pulmonary/Chest: Effort normal. No respiratory distress.     Ortho Exam  Left Knee Exam:  Erythema: no  Swelling: no  Increased Warmth: no  Tenderness: +mildly over medial patellofemoral joint line, +MJL mildly  ROM: 0-130  Knee flexion strength: 5/5  Knee extension strength: 5/5  Patellar Grind: +  Varus laxity: negative  Valgus laxity: negative    No calf tenderness to palpation     Gait: normal w/o antalgia    Procedures

## 2025-02-18 LAB
LEFT EYE DIABETIC RETINOPATHY: POSITIVE
RIGHT EYE DIABETIC RETINOPATHY: POSITIVE

## 2025-02-21 DIAGNOSIS — E11.65 TYPE 2 DIABETES MELLITUS WITH HYPERGLYCEMIA, WITHOUT LONG-TERM CURRENT USE OF INSULIN (HCC): ICD-10-CM

## 2025-02-21 DIAGNOSIS — I10 ESSENTIAL HYPERTENSION: ICD-10-CM

## 2025-02-21 DIAGNOSIS — B02.29 POST HERPETIC NEURALGIA: ICD-10-CM

## 2025-02-24 RX ORDER — AMLODIPINE BESYLATE 2.5 MG/1
5 TABLET ORAL DAILY
Qty: 180 TABLET | Refills: 1 | Status: SHIPPED | OUTPATIENT
Start: 2025-02-24

## 2025-02-24 RX ORDER — METFORMIN HYDROCHLORIDE 750 MG/1
750 TABLET, EXTENDED RELEASE ORAL
Qty: 90 TABLET | Refills: 0 | Status: SHIPPED | OUTPATIENT
Start: 2025-02-24

## 2025-02-24 RX ORDER — GABAPENTIN 300 MG/1
300 CAPSULE ORAL 3 TIMES DAILY
Qty: 270 CAPSULE | Refills: 0 | Status: SHIPPED | OUTPATIENT
Start: 2025-02-24

## 2025-03-12 ENCOUNTER — APPOINTMENT (EMERGENCY)
Dept: CT IMAGING | Facility: HOSPITAL | Age: 75
End: 2025-03-12
Payer: MEDICARE

## 2025-03-12 ENCOUNTER — HOSPITAL ENCOUNTER (EMERGENCY)
Facility: HOSPITAL | Age: 75
Discharge: HOME | End: 2025-03-12
Attending: EMERGENCY MEDICINE
Payer: MEDICARE

## 2025-03-12 VITALS
DIASTOLIC BLOOD PRESSURE: 82 MMHG | TEMPERATURE: 97.3 F | BODY MASS INDEX: 30.61 KG/M2 | WEIGHT: 178.35 LBS | OXYGEN SATURATION: 93 % | HEART RATE: 65 BPM | RESPIRATION RATE: 20 BRPM | SYSTOLIC BLOOD PRESSURE: 141 MMHG
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
## 2025-03-12 DIAGNOSIS — S30.0XXA LUMBAR CONTUSION, INITIAL ENCOUNTER: ICD-10-CM

## 2025-03-12 LAB
ABO GROUP BLD: NORMAL
ANION GAP SERPL CALCULATED.3IONS-SCNC: 10 MMOL/L (ref 4–13)
APTT PPP: 21 SECONDS (ref 23–34)
BASOPHILS # BLD AUTO: 0.04 THOUSANDS/ÂΜL (ref 0–0.1)
BASOPHILS NFR BLD AUTO: 1 % (ref 0–1)
BLD GP AB SCN SERPL QL: NEGATIVE
BUN SERPL-MCNC: 17 MG/DL (ref 5–25)
CALCIUM SERPL-MCNC: 9.5 MG/DL (ref 8.4–10.2)
CHLORIDE SERPL-SCNC: 100 MMOL/L (ref 96–108)
CO2 SERPL-SCNC: 26 MMOL/L (ref 21–32)
CREAT SERPL-MCNC: 0.89 MG/DL (ref 0.6–1.3)
EOSINOPHIL # BLD AUTO: 0.18 THOUSAND/ÂΜL (ref 0–0.61)
EOSINOPHIL NFR BLD AUTO: 2 % (ref 0–6)
ERYTHROCYTE [DISTWIDTH] IN BLOOD BY AUTOMATED COUNT: 12.3 % (ref 11.6–15.1)
GFR SERPL CREATININE-BSD FRML MDRD: 64 ML/MIN/1.73SQ M
GLUCOSE SERPL-MCNC: 217 MG/DL (ref 65–140)
HCT VFR BLD AUTO: 40.4 % (ref 34.8–46.1)
HGB BLD-MCNC: 13.3 G/DL (ref 11.5–15.4)
IMM GRANULOCYTES # BLD AUTO: 0.03 THOUSAND/UL (ref 0–0.2)
IMM GRANULOCYTES NFR BLD AUTO: 0 % (ref 0–2)
INR PPP: 0.99 (ref 0.85–1.19)
LYMPHOCYTES # BLD AUTO: 1.98 THOUSANDS/ÂΜL (ref 0.6–4.47)
LYMPHOCYTES NFR BLD AUTO: 24 % (ref 14–44)
MCH RBC QN AUTO: 30.7 PG (ref 26.8–34.3)
MCHC RBC AUTO-ENTMCNC: 32.9 G/DL (ref 31.4–37.4)
MCV RBC AUTO: 93 FL (ref 82–98)
MONOCYTES # BLD AUTO: 0.45 THOUSAND/ÂΜL (ref 0.17–1.22)
MONOCYTES NFR BLD AUTO: 5 % (ref 4–12)
NEUTROPHILS # BLD AUTO: 5.69 THOUSANDS/ÂΜL (ref 1.85–7.62)
NEUTS SEG NFR BLD AUTO: 68 % (ref 43–75)
NRBC BLD AUTO-RTO: 0 /100 WBCS
PLATELET # BLD AUTO: 230 THOUSANDS/UL (ref 149–390)
PMV BLD AUTO: 10.1 FL (ref 8.9–12.7)
POTASSIUM SERPL-SCNC: 3.9 MMOL/L (ref 3.5–5.3)
PROTHROMBIN TIME: 13.5 SECONDS (ref 12.3–15)
RBC # BLD AUTO: 4.33 MILLION/UL (ref 3.81–5.12)
RH BLD: NEGATIVE
SODIUM SERPL-SCNC: 136 MMOL/L (ref 135–147)
SPECIMEN EXPIRATION DATE: NORMAL
WBC # BLD AUTO: 8.37 THOUSAND/UL (ref 4.31–10.16)

## 2025-03-12 PROCEDURE — 86850 RBC ANTIBODY SCREEN: CPT | Performed by: EMERGENCY MEDICINE

## 2025-03-12 PROCEDURE — 85730 THROMBOPLASTIN TIME PARTIAL: CPT | Performed by: EMERGENCY MEDICINE

## 2025-03-12 PROCEDURE — 70450 CT HEAD/BRAIN W/O DYE: CPT

## 2025-03-12 PROCEDURE — 85025 COMPLETE CBC W/AUTO DIFF WBC: CPT | Performed by: EMERGENCY MEDICINE

## 2025-03-12 PROCEDURE — 86900 BLOOD TYPING SEROLOGIC ABO: CPT | Performed by: EMERGENCY MEDICINE

## 2025-03-12 PROCEDURE — 96374 THER/PROPH/DIAG INJ IV PUSH: CPT

## 2025-03-12 PROCEDURE — 80048 BASIC METABOLIC PNL TOTAL CA: CPT | Performed by: EMERGENCY MEDICINE

## 2025-03-12 PROCEDURE — 72131 CT LUMBAR SPINE W/O DYE: CPT

## 2025-03-12 PROCEDURE — 99284 EMERGENCY DEPT VISIT MOD MDM: CPT

## 2025-03-12 PROCEDURE — 36415 COLL VENOUS BLD VENIPUNCTURE: CPT | Performed by: EMERGENCY MEDICINE

## 2025-03-12 PROCEDURE — 85610 PROTHROMBIN TIME: CPT | Performed by: EMERGENCY MEDICINE

## 2025-03-12 PROCEDURE — 99284 EMERGENCY DEPT VISIT MOD MDM: CPT | Performed by: EMERGENCY MEDICINE

## 2025-03-12 PROCEDURE — 86901 BLOOD TYPING SEROLOGIC RH(D): CPT | Performed by: EMERGENCY MEDICINE

## 2025-03-12 RX ORDER — KETOROLAC TROMETHAMINE 30 MG/ML
15 INJECTION, SOLUTION INTRAMUSCULAR; INTRAVENOUS ONCE
Status: COMPLETED | OUTPATIENT
Start: 2025-03-12 | End: 2025-03-12

## 2025-03-12 RX ORDER — OXYCODONE AND ACETAMINOPHEN 5; 325 MG/1; MG/1
1 TABLET ORAL EVERY 8 HOURS PRN
Qty: 12 TABLET | Refills: 0 | Status: SHIPPED | OUTPATIENT
Start: 2025-03-12 | End: 2025-03-16

## 2025-03-12 RX ORDER — OXYCODONE AND ACETAMINOPHEN 5; 325 MG/1; MG/1
1 TABLET ORAL ONCE
Refills: 0 | Status: COMPLETED | OUTPATIENT
Start: 2025-03-12 | End: 2025-03-12

## 2025-03-12 RX ADMIN — OXYCODONE HYDROCHLORIDE AND ACETAMINOPHEN 1 TABLET: 5; 325 TABLET ORAL at 13:45

## 2025-03-12 RX ADMIN — KETOROLAC TROMETHAMINE 15 MG: 30 INJECTION, SOLUTION INTRAMUSCULAR; INTRAVENOUS at 13:45

## 2025-03-12 NOTE — ED NOTES
Pt resting comfortably at this time, waiting for provider evaluation at this time      Cassie Plasencia, RN  03/12/25 [id number]

## 2025-03-12 NOTE — ED PROVIDER NOTES
Emergency Department Trauma Note  Kimberly Kowalski 74 y.o. female MRN: [id number]  Unit/Bed#: ED 01/ED 01 Encounter: [id number]      Trauma Alert: Trauma Acuity: Trauma Evaluation  Model of Arrival:   via    Trauma Team: Current Providers  Attending Provider: Mercedes Shukla DO  Registered Nurse: Cassie Plasencia, RN  Registered Nurse: Tita Ferguson, RN  Consultants:     None      History of Present Illness     Chief Complaint:   Chief Complaint   Patient presents with    Fall     Pt fell outside yesterday unsure of HS +aspirin therapy -LOC. C/O of back pain     HPI:  Kimberly Kowalski is a 74 y.o. female who presents with low back pain.  Mechanism:Details of Incident: Fall while gardening, unsure of headstrike, takes aspirin Injury Date: 03/11/25 Injury Time: 1430 Injury Occurence Location - Specify County: [facility]    This is a 74-year-old female presenting to the ED for evaluation of head strike yesterday.  Patient states that she was outside raking the yard when she fell.  She is currently on aspirin.  Patient denies any loss of consciousness but is complaining of low back pain.      Review of Systems   Constitutional:  Negative for chills and fever.   HENT:  Negative for ear pain and sore throat.    Eyes:  Negative for pain and visual disturbance.   Respiratory:  Negative for cough and shortness of breath.    Cardiovascular:  Negative for chest pain and palpitations.   Gastrointestinal:  Negative for abdominal pain and vomiting.   Genitourinary:  Negative for dysuria and hematuria.   Musculoskeletal:  Positive for back pain. Negative for arthralgias.   Skin:  Negative for color change and rash.   Neurological:  Negative for seizures and syncope.   All other systems reviewed and are negative.      Historical Information     Immunizations:   Immunization History   Administered Date(s) Administered    COVID-19 MODERNA VACC 0.5 ML IM 03/19/2021, 04/19/2021, 11/01/2021, 05/07/2022    COVID-19 Pfizer Vac BIVALENT  Tian-sucrose 12 Yr+ IM 09/20/2022    COVID-19 Pfizer mRNA vacc PF tian-sucrose 12 yr and older (Comirnaty) 10/23/2023    COVID-19, unspecified 10/23/2023, 09/25/2024    INFLUENZA 11/02/2005, 10/08/2008, 09/22/2009, 10/07/2010, 10/10/2012, 09/24/2013, 10/07/2016, 10/12/2016, 09/26/2017, 09/17/2018, 10/04/2019, 09/08/2020, 08/30/2021, 09/20/2022, 08/25/2023, 09/25/2024    Influenza Quadrivalent Preservative Free 3 years and older IM 10/12/2016    Influenza Split High Dose Preservative Free IM 09/26/2017, 09/17/2018    Influenza, high dose seasonal 0.7 mL 10/04/2019    Influenza, seasonal, injectable, preservative free 10/04/2015    Pneumococcal Conjugate 13-Valent 09/26/2017    Pneumococcal Conjugate Vaccine 20-valent (Pcv20), Polysace 10/24/2023    Pneumococcal Polysaccharide PPV23 12/23/2020    Tdap 05/24/2024    Zoster Vaccine Recombinant 12/23/2020, 05/03/2021       Past Medical History:   Diagnosis Date    Cervicalgia     Depression     Diabetes mellitus (HCC)     Esophageal reflux     Hyperlipidemia     Hypertension     Essential    Migraine     Nonspecific abnormal electrocardiogram (ECG)     Sciatica     Seizure disorder (HCC)     Sjogren's syndrome (HCC)        Family History   Problem Relation Age of Onset    Aneurysm Mother     Heart disease Father     Lupus Sister     Thyroid cancer Sister         age unknown    No Known Problems Sister     No Known Problems Sister     Diabetes Maternal Grandmother     No Known Problems Maternal Grandfather     No Known Problems Paternal Grandmother     No Known Problems Paternal Grandfather     Heart disease Brother     Diabetes Maternal Aunt     No Known Problems Maternal Aunt     No Known Problems Maternal Aunt     No Known Problems Paternal Aunt     Breast cancer Neg Hx     BRCA2 Positive Neg Hx     BRCA1 Positive Neg Hx     Endometrial cancer Neg Hx     Colon cancer Neg Hx     Ovarian cancer Neg Hx     BRCA1 Negative Neg Hx     BRCA2 Negative Neg Hx     Breast cancer  additional onset Neg Hx     BRCA 1/2 Neg Hx      Past Surgical History:   Procedure Laterality Date    CERVICAL POLYPECTOMY      CRANIOTOMY       Left frontal craniotomy for resection of tumor-Dr. Jose Luis Murillo [facility]     TUBAL LIGATION       Social History     Tobacco Use    Smoking status: Never     Passive exposure: Never    Smokeless tobacco: Never   Vaping Use    Vaping status: Never Used   Substance Use Topics    Alcohol use: Never    Drug use: Never     E-Cigarette/Vaping    E-Cigarette Use Never User      E-Cigarette/Vaping Substances    Nicotine No     THC No     CBD No     Flavoring No     Other No     Unknown No        Family History: non-contributory    Meds/Allergies   Prior to Admission Medications   Prescriptions Last Dose Informant Patient Reported? Taking?   ASPIRIN 81 PO   Yes No   Sig: Take 81 mg by mouth daily   Multiple Vitamins-Minerals (MULTIVITAMIN WITH MINERALS) tablet   Yes No   Sig: Take 1 tablet by mouth daily   amLODIPine (NORVASC) 2.5 mg tablet   No No   Sig: TAKE 2 TABLETS DAILY   atorvastatin (LIPITOR) 20 mg tablet   No No   Sig: Take 1 tablet (20 mg total) by mouth daily at bedtime   calcium citrate-vitamin D 315 mg-5 mcg tablet   Yes No   Sig: Take 1 tablet by mouth 2 (two) times a day   cholecalciferol (VITAMIN D3) 1,000 units tablet   Yes No   Sig: Take 1,000 Units by mouth daily   escitalopram (LEXAPRO) 20 mg tablet   Yes No   gabapentin (NEURONTIN) 300 mg capsule   No No   Sig: TAKE 1 CAPSULE THREE TIMES A DAY   hydroxychloroquine (PLAQUENIL) 200 mg tablet   No No   Sig: Take 2 tablets (400 mg total) by mouth daily with breakfast   Patient not taking: Reported on 12/9/2024   lansoprazole (PREVACID) 15 mg capsule   No No   Sig: Take 1 capsule (15 mg total) by mouth daily   levETIRAcetam (KEPPRA) 500 mg tablet   Yes No   levETIRAcetam (KEPPRA) 750 mg tablet   Yes No   Sig: Take 500 mg by mouth 2 (two) times a day 1 tab in am and 1.5 tabs in pm   Patient not taking:  Reported on 1/14/2025   lidocaine-prilocaine (EMLA) cream   No No   Sig: Apply topically daily at bedtime as needed for mild pain   metFORMIN (GLUCOPHAGE-XR) 750 mg 24 hr tablet   No No   Sig: TAKE 1 TABLET DAILY WITH BREAKFAST   pilocarpine (SALAGEN) 5 mg tablet   No No   Sig: Take 1 tablet (5 mg total) by mouth 2 (two) times a day   Patient not taking: Reported on 12/9/2024   topiramate (TOPAMAX) 100 mg tablet   Yes No   Sig: Take 50 mg by mouth 2 (two) times a day   Patient not taking: Reported on 1/14/2025   topiramate (TOPAMAX) 50 MG tablet   Yes No   Patient not taking: Reported on 1/14/2025   traZODone (DESYREL) 100 mg tablet   No No   Sig: Take 1 tablet (100 mg total) by mouth daily at bedtime   venlafaxine (EFFEXOR-XR) 75 mg 24 hr capsule   No No   Sig: Take 1 capsule (75 mg total) by mouth daily      Facility-Administered Medications: None       Allergies   Allergen Reactions    Lamotrigine      Other reaction(s): LAMOTRIGINE (LAMICTAL) rash       PHYSICAL EXAM    PE limited by: no limitations    Objective   Vitals:   First set: Temperature: (!) 97.3 °F (36.3 °C) (03/12/25 1215)  Pulse: 70 (03/12/25 1215)  Respirations: 16 (03/12/25 1215)  Blood Pressure: 137/65 (03/12/25 1215)  SpO2: 95 % (03/12/25 1215)    Primary Survey:   (A) Airway: patent  (B) Breathing:CTA bilaterally   (C) Circulation: Pulses:   normal  (D) Disabliity:  GCS Total:  15  (E) Expose:  Completed    Secondary Survey: (Click on Physical Exam tab above)  Physical Exam  Vitals and nursing note reviewed.   Constitutional:       General: She is in acute distress.      Appearance: Normal appearance. She is well-developed and normal weight.   HENT:      Head: Normocephalic and atraumatic.      Right Ear: External ear normal.      Left Ear: External ear normal.      Nose: Nose normal.      Mouth/Throat:      Mouth: Mucous membranes are moist.   Eyes:      Extraocular Movements: Extraocular movements intact.      Conjunctiva/sclera: Conjunctivae  normal.   Neck:      Vascular: No carotid bruit.   Cardiovascular:      Rate and Rhythm: Normal rate and regular rhythm.      Pulses: Normal pulses.      Heart sounds: Normal heart sounds. No murmur heard.  Pulmonary:      Effort: Pulmonary effort is normal. No respiratory distress.      Breath sounds: Normal breath sounds.   Abdominal:      General: Abdomen is flat. Bowel sounds are normal. There is no distension.      Palpations: Abdomen is soft. There is no mass.      Tenderness: There is no abdominal tenderness. There is no guarding or rebound.      Hernia: No hernia is present.   Musculoskeletal:         General: Tenderness present. No deformity or signs of injury. Normal range of motion.      Cervical back: Normal range of motion and neck supple. No rigidity or tenderness.      Right lower leg: No edema.      Left lower leg: No edema.      Comments: Tenderness to midline lumbar spine, coccygeal tenderness   Lymphadenopathy:      Cervical: No cervical adenopathy.   Skin:     General: Skin is warm and dry.      Capillary Refill: Capillary refill takes less than 2 seconds.      Coloration: Skin is not jaundiced or pale.      Findings: No bruising, erythema, lesion or rash.   Neurological:      General: No focal deficit present.      Mental Status: She is alert and oriented to person, place, and time. Mental status is at baseline.   Psychiatric:         Mood and Affect: Mood normal.         Behavior: Behavior normal.         Cervical spine cleared by clinical criteria? Yes     Invasive Devices       Peripheral Intravenous Line  Duration             Peripheral IV 03/12/25 Distal;Dorsal (posterior);Left Forearm <1 day                    Lab Results:   Results Reviewed       Procedure Component Value Units Date/Time    Protime-INR [[id number]]  (Normal) Collected: 03/12/25 1234    Lab Status: Final result Specimen: Blood from Arm, Left Updated: 03/12/25 1300     Protime 13.5 seconds      INR 0.99    Narrative:       INR Therapeutic Range    Indication                                             INR Range      Atrial Fibrillation                                               2.0-3.0  Hypercoagulable State                                    2.0.2.3  Left Ventricular Asist Device                            2.0-3.0  Mechanical Heart Valve                                  -    Aortic(with afib, MI, embolism, HF, LA enlargement,    and/or coagulopathy)                                     2.0-3.0 (2.5-3.5)     Mitral                                                             2.5-3.5  Prosthetic/Bioprosthetic Heart Valve               2.0-3.0  Venous thromboembolism (VTE: VT, PE        2.0-3.0    APTT [[id number]]  (Abnormal) Collected: 03/12/25 1234    Lab Status: Final result Specimen: Blood from Arm, Left Updated: 03/12/25 1300     PTT 21 seconds     Basic metabolic panel [[id number]]  (Abnormal) Collected: 03/12/25 1234    Lab Status: Final result Specimen: Blood from Arm, Left Updated: 03/12/25 1257     Sodium 136 mmol/L      Potassium 3.9 mmol/L      Chloride 100 mmol/L      CO2 26 mmol/L      ANION GAP 10 mmol/L      BUN 17 mg/dL      Creatinine 0.89 mg/dL      Glucose 217 mg/dL      Calcium 9.5 mg/dL      eGFR 64 ml/min/1.73sq m     Narrative:      National Kidney Disease Foundation guidelines for Chronic Kidney Disease (CKD):     Stage 1 with normal or high GFR (GFR > 90 mL/min/1.73 square meters)    Stage 2 Mild CKD (GFR = 60-89 mL/min/1.73 square meters)    Stage 3A Moderate CKD (GFR = 45-59 mL/min/1.73 square meters)    Stage 3B Moderate CKD (GFR = 30-44 mL/min/1.73 square meters)    Stage 4 Severe CKD (GFR = 15-29 mL/min/1.73 square meters)    Stage 5 End Stage CKD (GFR <15 mL/min/1.73 square meters)  Note: GFR calculation is accurate only with a steady state creatinine    CBC and differential [[id number]] Collected: 03/12/25 1234    Lab Status: Final result Specimen: Blood from Arm, Left Updated: 03/12/25 1241     WBC 8.37  Thousand/uL      RBC 4.33 Million/uL      Hemoglobin 13.3 g/dL      Hematocrit 40.4 %      MCV 93 fL      MCH 30.7 pg      MCHC 32.9 g/dL      RDW 12.3 %      MPV 10.1 fL      Platelets 230 Thousands/uL      nRBC 0 /100 WBCs      Segmented % 68 %      Immature Grans % 0 %      Lymphocytes % 24 %      Monocytes % 5 %      Eosinophils Relative 2 %      Basophils Relative 1 %      Absolute Neutrophils 5.69 Thousands/µL      Absolute Immature Grans 0.03 Thousand/uL      Absolute Lymphocytes 1.98 Thousands/µL      Absolute Monocytes 0.45 Thousand/µL      Eosinophils Absolute 0.18 Thousand/µL      Basophils Absolute 0.04 Thousands/µL                    Imaging Studies:   Direct to CT: No  TRAUMA - CT head wo contrast   Final Result by Guru Mejia MD (03/12 1258)      No interval change. No acute intracranial hemorrhage or depressed calvarial fracture identified.                  Workstation performed: [id number]         TRAUMA - CT spine lumbar wo contrast   Final Result by Guru Mejia MD (03/12 1308)      No acute fracture or evidence for traumatic malalignment. Degenerative changes of the lumbar spine as described above.      Workstation performed: [id number]               Procedures  Procedures         ED Course  ED Course as of 03/12/25 1345   Wed Mar 12, 2025   1344 Patient had a stable ED course with no acute findings on the workup.  She is stable for discharge with outpatient follow-up with her PCP.           Medical Decision Making  Differential diagnosis includes but not intracranial bleed, lumbar/coccygeal: fracture/sprain/strain/contusion    Amount and/or Complexity of Data Reviewed  Labs: ordered.  Radiology: ordered.    Risk  Prescription drug management.                Disposition  [facility] Transfer: No  Final diagnoses:   Fall, initial encounter   Lumbar contusion, initial encounter     Time reflects when diagnosis was documented in both MDM as applicable and the Disposition within this note       Time  User Action Codes Description Comment    3/12/2025  1:30 PM HueyCruz storeysaadia Add [W19.XXXA] Fall, initial encounter     3/12/2025  1:31 PM Vazquez Mercedes Add [S30.0XXA] Lumbar contusion, initial encounter           ED Disposition       ED Disposition   Discharge    Condition   Stable    Date/Time   Wed Mar 12, 2025  1:30 PM    Comment   Kimberly Cantut discharge to home/self care.                   Follow-up Information       Follow up With Specialties Details Why Contact Info    [facility], Nurse Practitioner   [address] [place] [id number] [phone]            Current Discharge Medication List        START taking these medications    Details   oxyCODONE-acetaminophen (Percocet) 5-325 mg per tablet Take 1 tablet by mouth every 8 (eight) hours as needed for severe pain for up to 4 days Max Daily Amount: 3 tablets  Qty: 12 tablet, Refills: 0    Associated Diagnoses: Lumbar contusion, initial encounter           CONTINUE these medications which have NOT CHANGED    Details   amLODIPine (NORVASC) 2.5 mg tablet TAKE 2 TABLETS DAILY  Qty: 180 tablet, Refills: 1    Associated Diagnoses: Essential hypertension      ASPIRIN 81 PO Take 81 mg by mouth daily      atorvastatin (LIPITOR) 20 mg tablet Take 1 tablet (20 mg total) by mouth daily at bedtime  Qty: 90 tablet, Refills: 3    Associated Diagnoses: Other hyperlipidemia      calcium citrate-vitamin D 315 mg-5 mcg tablet Take 1 tablet by mouth 2 (two) times a day      cholecalciferol (VITAMIN D3) 1,000 units tablet Take 1,000 Units by mouth daily      escitalopram (LEXAPRO) 20 mg tablet       gabapentin (NEURONTIN) 300 mg capsule TAKE 1 CAPSULE THREE TIMES A DAY  Qty: 270 capsule, Refills: 0    Associated Diagnoses: Post herpetic neuralgia      hydroxychloroquine (PLAQUENIL) 200 mg tablet Take 2 tablets (400 mg total) by mouth daily with breakfast  Qty: 180 tablet, Refills: 3    Associated Diagnoses: Sjogren's syndrome, with unspecified organ  involvement (HCC); History of rheumatoid arthritis; Primary osteoarthritis of both knees; Encounter for screening for other viral diseases      lansoprazole (PREVACID) 15 mg capsule Take 1 capsule (15 mg total) by mouth daily  Qty: 90 capsule, Refills: 1    Associated Diagnoses: Gastroesophageal reflux disease without esophagitis      !! levETIRAcetam (KEPPRA) 500 mg tablet       !! levETIRAcetam (KEPPRA) 750 mg tablet Take 500 mg by mouth 2 (two) times a day 1 tab in am and 1.5 tabs in pm      lidocaine-prilocaine (EMLA) cream Apply topically daily at bedtime as needed for mild pain  Qty: 30 g, Refills: 1    Associated Diagnoses: Post herpetic neuralgia      metFORMIN (GLUCOPHAGE-XR) 750 mg 24 hr tablet TAKE 1 TABLET DAILY WITH BREAKFAST  Qty: 90 tablet, Refills: 0    Associated Diagnoses: Type 2 diabetes mellitus with hyperglycemia, without long-term current use of insulin (HCC)      Multiple Vitamins-Minerals (MULTIVITAMIN WITH MINERALS) tablet Take 1 tablet by mouth daily      pilocarpine (SALAGEN) 5 mg tablet Take 1 tablet (5 mg total) by mouth 2 (two) times a day  Qty: 180 tablet, Refills: 3    Associated Diagnoses: Sjogren's syndrome, with unspecified organ involvement (HCC)      !! topiramate (TOPAMAX) 100 mg tablet Take 50 mg by mouth 2 (two) times a day      !! topiramate (TOPAMAX) 50 MG tablet       traZODone (DESYREL) 100 mg tablet Take 1 tablet (100 mg total) by mouth daily at bedtime  Qty: 90 tablet, Refills: 1    Associated Diagnoses: Mild episode of recurrent major depressive disorder (HCC)      venlafaxine (EFFEXOR-XR) 75 mg 24 hr capsule Take 1 capsule (75 mg total) by mouth daily  Qty: 90 capsule, Refills: 1    Associated Diagnoses: Mild episode of recurrent major depressive disorder (HCC)       !! - Potential duplicate medications found. Please discuss with provider.        No discharge procedures on file.    PDMP Review       None            ED Provider  Electronically Signed by           Mercedes  Rachael Shukla,   03/12/25 0649

## 2025-04-24 ENCOUNTER — OFFICE VISIT (OUTPATIENT)
Dept: FAMILY MEDICINE CLINIC | Facility: CLINIC | Age: 75
End: 2025-04-24
Payer: MEDICARE

## 2025-04-24 VITALS
SYSTOLIC BLOOD PRESSURE: 138 MMHG | BODY MASS INDEX: 31.79 KG/M2 | WEIGHT: 186.2 LBS | OXYGEN SATURATION: 97 % | HEART RATE: 75 BPM | DIASTOLIC BLOOD PRESSURE: 82 MMHG | HEIGHT: 64 IN | RESPIRATION RATE: 18 BRPM

## 2025-04-24 DIAGNOSIS — M32.9 SYSTEMIC LUPUS ERYTHEMATOSUS, UNSPECIFIED SLE TYPE, UNSPECIFIED ORGAN INVOLVEMENT STATUS (HCC): ICD-10-CM

## 2025-04-24 DIAGNOSIS — R13.19 ESOPHAGEAL DYSPHAGIA: ICD-10-CM

## 2025-04-24 DIAGNOSIS — G40.909 SEIZURE DISORDER (HCC): ICD-10-CM

## 2025-04-24 DIAGNOSIS — E11.65 TYPE 2 DIABETES MELLITUS WITH HYPERGLYCEMIA, WITHOUT LONG-TERM CURRENT USE OF INSULIN (HCC): Primary | ICD-10-CM

## 2025-04-24 DIAGNOSIS — D43.2 GANGLIOGLIOMA OF BRAIN (HCC): ICD-10-CM

## 2025-04-24 DIAGNOSIS — K21.9 GASTROESOPHAGEAL REFLUX DISEASE WITHOUT ESOPHAGITIS: ICD-10-CM

## 2025-04-24 DIAGNOSIS — T17.208A FOREIGN BODY IN PHARYNX, INITIAL ENCOUNTER: ICD-10-CM

## 2025-04-24 DIAGNOSIS — Z91.81 HISTORY OF RECENT FALL: ICD-10-CM

## 2025-04-24 DIAGNOSIS — N18.30 STAGE 3 CHRONIC KIDNEY DISEASE, UNSPECIFIED WHETHER STAGE 3A OR 3B CKD (HCC): ICD-10-CM

## 2025-04-24 LAB — SL AMB POCT HEMOGLOBIN AIC: 8.5 (ref ?–6.5)

## 2025-04-24 PROCEDURE — G2211 COMPLEX E/M VISIT ADD ON: HCPCS | Performed by: NURSE PRACTITIONER

## 2025-04-24 PROCEDURE — 99214 OFFICE O/P EST MOD 30 MIN: CPT | Performed by: NURSE PRACTITIONER

## 2025-04-24 PROCEDURE — 83036 HEMOGLOBIN GLYCOSYLATED A1C: CPT | Performed by: NURSE PRACTITIONER

## 2025-04-24 NOTE — PROGRESS NOTES
Name: Kimberly Kowalski      : 1950      MRN: [id number]  Encounter Provider: [facility]  Encounter Date: 2025   [facility]  :  Assessment & Plan  Type 2 diabetes mellitus with hyperglycemia, without long-term current use of insulin (HCC)  She reports she has been drinking soda's daily that are not sugar-free.  She also drinks water.      Advised to take a water with her when she leaves the house instead of a soda.    Lab Results   Component Value Date    HGBA1C 8.5 (A) 2025       Orders:  •  POCT hemoglobin A1c    Systemic lupus erythematosus, unspecified SLE type, unspecified organ involvement status (HCC)  Stable, followed by rheumatology.        Seizure disorder ([facility])  No recent events, neurology has been weaning her off her meds slowly.         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        Stage 3 chronic kidney disease, unspecified whether stage 3a or 3b CKD ([facility])  Lab Results   Component Value Date    EGFR 64 2025    EGFR 58 2023    EGFR 53 2023    CREATININE 0.89 2025    CREATININE 0.98 2023    CREATININE 1.05 2023          Gastroesophageal reflux disease without esophagitis  Hx of this but she reports the current issues is different.        Esophageal dysphagia  Discussed options for treatment - she was agreeable for a swallowing study.    Orders:  •  FL barium swallow ROUTINE esophagus; Future    Foreign body in pharynx, initial encounter    Orders:  •  FL barium swallow ROUTINE esophagus; Future    History of recent fall  Fall times two - no major injuries.                History of Present Illness   Here for a follow-up.  Report recent hx of falls x 2.  She was seen in the ED after the first fall - had scans that were all negative.      Her HA1C was elevated from previous - notes of increased soda drinking that is NOT sugar-free.       Review of Systems   Constitutional:  "Negative.    Respiratory: Negative.     Cardiovascular: Negative.    Musculoskeletal:         Falls - see HPI   All other systems reviewed and are negative.      Objective   /82 (BP Location: Left arm, Patient Position: Sitting, Cuff Size: Standard)   Pulse 75   Resp 18   Ht 5' 4\" (1.626 m)   Wt 84.5 kg (186 lb 3.2 oz)   SpO2 97%   BMI 31.96 kg/m²      Physical Exam  Vitals reviewed.   Constitutional:       Appearance: Normal appearance.   Cardiovascular:      Rate and Rhythm: Normal rate and regular rhythm.   Pulmonary:      Effort: Pulmonary effort is normal.      Breath sounds: Normal breath sounds.   Neurological:      Mental Status: She is alert and oriented to person, place, and time.           "

## 2025-04-24 NOTE — ASSESSMENT & PLAN NOTE
Lab Results   Component Value Date    EGFR 64 03/12/2025    EGFR 58 12/08/2023    EGFR 53 01/24/2023    CREATININE 0.89 03/12/2025    CREATININE 0.98 12/08/2023    CREATININE 1.05 01/24/2023

## 2025-04-24 NOTE — ASSESSMENT & PLAN NOTE
She reports she has been drinking soda's daily that are not sugar-free.  She also drinks water.      Advised to take a water with her when she leaves the house instead of a soda.    Lab Results   Component Value Date    HGBA1C 8.5 (A) 04/24/2025       Orders:  •  POCT hemoglobin A1c

## 2025-04-30 ENCOUNTER — HOSPITAL ENCOUNTER (OUTPATIENT)
Dept: RADIOLOGY | Facility: HOSPITAL | Age: 75
Discharge: HOME | End: 2025-04-30
Attending: NURSE PRACTITIONER
Payer: MEDICARE

## 2025-04-30 DIAGNOSIS — T17.208A FOREIGN BODY IN PHARYNX, INITIAL ENCOUNTER: ICD-10-CM

## 2025-04-30 DIAGNOSIS — R13.19 ESOPHAGEAL DYSPHAGIA: ICD-10-CM

## 2025-04-30 PROCEDURE — 74220 X-RAY XM ESOPHAGUS 1CNTRST: CPT

## 2025-05-02 ENCOUNTER — TELEPHONE (OUTPATIENT)
Age: 75
End: 2025-05-02

## 2025-05-02 ENCOUNTER — RESULTS FOLLOW-UP (OUTPATIENT)
Dept: FAMILY MEDICINE CLINIC | Facility: CLINIC | Age: 75
End: 2025-05-02

## 2025-05-02 DIAGNOSIS — R93.3 ABNORMAL BARIUM SWALLOW: ICD-10-CM

## 2025-05-02 DIAGNOSIS — R13.10 DYSPHAGIA, UNSPECIFIED TYPE: Primary | ICD-10-CM

## 2025-05-02 NOTE — TELEPHONE ENCOUNTER
Patients GI provider:       Number to return call: ([phone]     Reason for call: Pt  calling to schedule an EGD advised an OV is needed first.  Needing to schedule in [place], provided phone number for [facility]    Scheduled procedure/appointment date if applicable: Apt/procedure

## 2025-05-03 DIAGNOSIS — B02.29 POST HERPETIC NEURALGIA: ICD-10-CM

## 2025-05-03 DIAGNOSIS — E11.65 TYPE 2 DIABETES MELLITUS WITH HYPERGLYCEMIA, WITHOUT LONG-TERM CURRENT USE OF INSULIN (HCC): ICD-10-CM

## 2025-05-03 DIAGNOSIS — E78.49 OTHER HYPERLIPIDEMIA: ICD-10-CM

## 2025-05-05 RX ORDER — GABAPENTIN 300 MG/1
300 CAPSULE ORAL 3 TIMES DAILY
Qty: 270 CAPSULE | Refills: 1 | Status: SHIPPED | OUTPATIENT
Start: 2025-05-05

## 2025-05-05 RX ORDER — METFORMIN HYDROCHLORIDE 750 MG/1
750 TABLET, EXTENDED RELEASE ORAL
Qty: 90 TABLET | Refills: 1 | Status: SHIPPED | OUTPATIENT
Start: 2025-05-05

## 2025-05-05 RX ORDER — ATORVASTATIN CALCIUM 20 MG/1
20 TABLET, FILM COATED ORAL
Qty: 90 TABLET | Refills: 0 | Status: SHIPPED | OUTPATIENT
Start: 2025-05-05

## 2025-05-09 ENCOUNTER — HOSPITAL ENCOUNTER (OUTPATIENT)
Dept: ULTRASOUND IMAGING | Facility: HOSPITAL | Age: 75
End: 2025-05-09
Attending: FAMILY MEDICINE
Payer: MEDICARE

## 2025-05-09 DIAGNOSIS — R93.3 ABNORMAL BARIUM SWALLOW: ICD-10-CM

## 2025-05-09 DIAGNOSIS — R13.10 DYSPHAGIA, UNSPECIFIED TYPE: ICD-10-CM

## 2025-05-09 PROCEDURE — 76536 US EXAM OF HEAD AND NECK: CPT

## 2025-05-15 ENCOUNTER — RESULTS FOLLOW-UP (OUTPATIENT)
Dept: FAMILY MEDICINE CLINIC | Facility: CLINIC | Age: 75
End: 2025-05-15

## 2025-05-28 ENCOUNTER — CONSULT (OUTPATIENT)
Dept: GASTROENTEROLOGY | Facility: MEDICAL CENTER | Age: 75
End: 2025-05-28
Payer: MEDICARE

## 2025-05-28 ENCOUNTER — TELEPHONE (OUTPATIENT)
Dept: GASTROENTEROLOGY | Facility: MEDICAL CENTER | Age: 75
End: 2025-05-28

## 2025-05-28 VITALS
BODY MASS INDEX: 31.92 KG/M2 | HEIGHT: 64 IN | SYSTOLIC BLOOD PRESSURE: 134 MMHG | HEART RATE: 69 BPM | DIASTOLIC BLOOD PRESSURE: 88 MMHG | WEIGHT: 187 LBS | OXYGEN SATURATION: 97 % | TEMPERATURE: 96.5 F

## 2025-05-28 DIAGNOSIS — R13.19 ESOPHAGEAL DYSPHAGIA: ICD-10-CM

## 2025-05-28 DIAGNOSIS — K44.9 HIATAL HERNIA WITH GASTROESOPHAGEAL REFLUX: Primary | ICD-10-CM

## 2025-05-28 DIAGNOSIS — K21.9 HIATAL HERNIA WITH GASTROESOPHAGEAL REFLUX: Primary | ICD-10-CM

## 2025-05-28 DIAGNOSIS — Z12.11 SCREENING FOR COLON CANCER: ICD-10-CM

## 2025-05-28 DIAGNOSIS — G40.909 SEIZURE DISORDER (HCC): ICD-10-CM

## 2025-05-28 PROCEDURE — 99204 OFFICE O/P NEW MOD 45 MIN: CPT | Performed by: STUDENT IN AN ORGANIZED HEALTH CARE EDUCATION/TRAINING PROGRAM

## 2025-05-28 PROCEDURE — G2211 COMPLEX E/M VISIT ADD ON: HCPCS | Performed by: STUDENT IN AN ORGANIZED HEALTH CARE EDUCATION/TRAINING PROGRAM

## 2025-05-28 RX ORDER — SODIUM CHLORIDE, SODIUM LACTATE, POTASSIUM CHLORIDE, CALCIUM CHLORIDE 600; 310; 30; 20 MG/100ML; MG/100ML; MG/100ML; MG/100ML
125 INJECTION, SOLUTION INTRAVENOUS CONTINUOUS
Status: CANCELLED | OUTPATIENT
Start: 2025-05-28

## 2025-05-28 RX ORDER — OMEPRAZOLE 40 MG/1
40 CAPSULE, DELAYED RELEASE ORAL DAILY
Qty: 90 CAPSULE | Refills: 3 | Status: SHIPPED | OUTPATIENT
Start: 2025-05-28

## 2025-05-28 NOTE — PROGRESS NOTES
Name: Kimberly Kowalski      : 1950      MRN: [id number]  Encounter Provider: Sarah Gil MD  Encounter Date: 2025   Encounter department: [facility]  :  Assessment & Plan  Hiatal hernia with gastroesophageal reflux  - Prescription for omeprazole 40 mg sent to pharmacy; switch to this medication upon receipt.  - Interim: double current dose of lansoprazole OTC  Orders:    omeprazole (PriLOSEC) 40 MG capsule; Take 1 capsule (40 mg total) by mouth daily    EGD; Future    Esophageal dysphagia  - Symptoms suggest potential Schatzki's ring and hiatal hernia contributing to reflux.  - Endoscopy recommended to confirm Schatzki's ring and perform dilation if necessary.  - Procedure scheduled for early next week at [facility].  - Chew food thoroughly and seek immediate medical attention if experiencing food impaction that does not resolve within 30 minutes.  Orders:    omeprazole (PriLOSEC) 40 MG capsule; Take 1 capsule (40 mg total) by mouth daily    EGD; Future    [facility]  [facility]       Seizure disorder (HCC)  No seizures since having surgery.  Instructed to take her Keppra as usual prior to EGD         Follow up after EGD    History of Present Illness   History of Present Illness  The patient is a 74-year-old female who presents for evaluation of GERD and dysphagia.    She reports experiencing difficulty swallowing, particularly with solid foods such as meat. She describes a sensation of food becoming lodged in her chest at base of breast bone during meals, which she attempts to dislodge. This issue has been ongoing for several years but has escalated in frequency over the past 6 months, causing her significant concern. She recalls specific incidents on Memorial Day and approximately 3 weeks prior.    She is experiencing symptoms of acid reflux, which persist despite long-term use of lansoprazole. She notes an increase in  "reflux symptoms following the barium swallow test.    She has not undergone an endoscopy to date. She uses Colog[facility] for colon cancer screening.    She has not had a seizure since she had brain surgery.     PAST SURGICAL HISTORY:  Brain surgery for tumor removal. Colonoscopy on 09/04/2009.          Review of Systems   Constitutional:  Negative for chills and fever.   HENT:  Negative for ear pain and sore throat.    Eyes:  Negative for pain and visual disturbance.   Respiratory:  Negative for cough and shortness of breath.    Cardiovascular:  Negative for chest pain and palpitations.   Gastrointestinal:  Negative for abdominal pain and vomiting.   Genitourinary:  Negative for dysuria and hematuria.   Musculoskeletal:  Negative for arthralgias and back pain.   Skin:  Negative for color change and rash.   Neurological:  Negative for seizures and syncope.   All other systems reviewed and are negative.         Objective   /88 (BP Location: Left arm, Patient Position: Sitting, Cuff Size: Large)   Pulse 69   Temp (!) 96.5 °F (35.8 °C) (Tympanic)   Ht 5' 4\" (1.626 m)   Wt 84.8 kg (187 lb)   SpO2 97%   BMI 32.10 kg/m²      Physical Exam  Vitals and nursing note reviewed.   Constitutional:       General: She is not in acute distress.     Appearance: She is well-developed.   HENT:      Head: Normocephalic and atraumatic.     Eyes:      Conjunctiva/sclera: Conjunctivae normal.       Cardiovascular:      Rate and Rhythm: Normal rate and regular rhythm.      Heart sounds: No murmur heard.  Pulmonary:      Effort: Pulmonary effort is normal. No respiratory distress.      Breath sounds: Normal breath sounds.   Abdominal:      Palpations: Abdomen is soft.      Tenderness: There is no abdominal tenderness.     Musculoskeletal:         General: No swelling.      Cervical back: Neck supple.     Skin:     General: Skin is warm and dry.      Capillary Refill: Capillary refill takes less than 2 seconds.     Neurological:      " Mental Status: She is alert.     Psychiatric:         Mood and Affect: Mood normal.       Results  Imaging   - Barium swallow: 04/30/2025, Fixed narrowing at the junction of the esophagus and small hiatus hernia, possibly representing Schatzki's ring or small stricture. Possible extrinsic narrowing at level of the thyroid.   - Thyroid ultrasound: 05/09/2025, No thyromegaly or suspicious thyroid nodules.  - Colonoscopy 2009 normal

## 2025-05-28 NOTE — TELEPHONE ENCOUNTER
Procedure: EGD  Date: 6/3/25  Physician performing: Dr. Gil  Location of procedure:    Instructions given to patient: EGD  Diabetic: Yes-metformin  Clearances: N/A

## 2025-06-03 ENCOUNTER — ANESTHESIA EVENT (OUTPATIENT)
Dept: GASTROENTEROLOGY | Facility: HOSPITAL | Age: 75
End: 2025-06-03
Payer: MEDICARE

## 2025-06-03 ENCOUNTER — ANESTHESIA (OUTPATIENT)
Dept: GASTROENTEROLOGY | Facility: HOSPITAL | Age: 75
End: 2025-06-03
Payer: MEDICARE

## 2025-06-03 ENCOUNTER — HOSPITAL ENCOUNTER (OUTPATIENT)
Dept: GASTROENTEROLOGY | Facility: HOSPITAL | Age: 75
Setting detail: OUTPATIENT SURGERY
Discharge: HOME | End: 2025-06-03
Attending: STUDENT IN AN ORGANIZED HEALTH CARE EDUCATION/TRAINING PROGRAM
Payer: MEDICARE

## 2025-06-03 VITALS
HEIGHT: 64 IN | BODY MASS INDEX: 31.92 KG/M2 | TEMPERATURE: 96.9 F | OXYGEN SATURATION: 96 % | HEART RATE: 73 BPM | SYSTOLIC BLOOD PRESSURE: 138 MMHG | RESPIRATION RATE: 18 BRPM | DIASTOLIC BLOOD PRESSURE: 60 MMHG | WEIGHT: 187 LBS

## 2025-06-03 DIAGNOSIS — R13.19 ESOPHAGEAL DYSPHAGIA: ICD-10-CM

## 2025-06-03 DIAGNOSIS — K21.9 HIATAL HERNIA WITH GASTROESOPHAGEAL REFLUX: ICD-10-CM

## 2025-06-03 DIAGNOSIS — K44.9 HIATAL HERNIA WITH GASTROESOPHAGEAL REFLUX: ICD-10-CM

## 2025-06-03 LAB — GLUCOSE SERPL-MCNC: 195 MG/DL (ref 65–140)

## 2025-06-03 PROCEDURE — 43249 ESOPH EGD DILATION <30 MM: CPT | Performed by: STUDENT IN AN ORGANIZED HEALTH CARE EDUCATION/TRAINING PROGRAM

## 2025-06-03 PROCEDURE — 88342 IMHCHEM/IMCYTCHM 1ST ANTB: CPT | Performed by: PATHOLOGY

## 2025-06-03 PROCEDURE — 43239 EGD BIOPSY SINGLE/MULTIPLE: CPT | Performed by: STUDENT IN AN ORGANIZED HEALTH CARE EDUCATION/TRAINING PROGRAM

## 2025-06-03 PROCEDURE — C1726 CATH, BAL DIL, NON-VASCULAR: HCPCS

## 2025-06-03 PROCEDURE — 88341 IMHCHEM/IMCYTCHM EA ADD ANTB: CPT | Performed by: PATHOLOGY

## 2025-06-03 PROCEDURE — 82948 REAGENT STRIP/BLOOD GLUCOSE: CPT

## 2025-06-03 PROCEDURE — 88305 TISSUE EXAM BY PATHOLOGIST: CPT | Performed by: PATHOLOGY

## 2025-06-03 RX ORDER — SODIUM CHLORIDE, SODIUM LACTATE, POTASSIUM CHLORIDE, CALCIUM CHLORIDE 600; 310; 30; 20 MG/100ML; MG/100ML; MG/100ML; MG/100ML
INJECTION, SOLUTION INTRAVENOUS CONTINUOUS PRN
Status: DISCONTINUED | OUTPATIENT
Start: 2025-06-03 | End: 2025-06-03

## 2025-06-03 RX ORDER — PROPOFOL 10 MG/ML
INJECTION, EMULSION INTRAVENOUS AS NEEDED
Status: DISCONTINUED | OUTPATIENT
Start: 2025-06-03 | End: 2025-06-03

## 2025-06-03 RX ORDER — SODIUM CHLORIDE, SODIUM LACTATE, POTASSIUM CHLORIDE, CALCIUM CHLORIDE 600; 310; 30; 20 MG/100ML; MG/100ML; MG/100ML; MG/100ML
125 INJECTION, SOLUTION INTRAVENOUS CONTINUOUS
Status: DISCONTINUED | OUTPATIENT
Start: 2025-06-03 | End: 2025-06-07 | Stop reason: HOSPADM

## 2025-06-03 RX ADMIN — SODIUM CHLORIDE, SODIUM LACTATE, POTASSIUM CHLORIDE, AND CALCIUM CHLORIDE: .6; .31; .03; .02 INJECTION, SOLUTION INTRAVENOUS at 08:23

## 2025-06-03 RX ADMIN — PROPOFOL 40 MG: 10 INJECTION, EMULSION INTRAVENOUS at 09:36

## 2025-06-03 RX ADMIN — PROPOFOL 50 MG: 10 INJECTION, EMULSION INTRAVENOUS at 09:40

## 2025-06-03 RX ADMIN — PROPOFOL 80 MG: 10 INJECTION, EMULSION INTRAVENOUS at 09:34

## 2025-06-03 NOTE — ANESTHESIA POSTPROCEDURE EVALUATION
Post-Op Assessment Note    CV Status:  Stable  Pain Score: 0    Pain management: adequate       Mental Status:  Alert   Hydration Status:  Stable   PONV Controlled:  Controlled   Airway Patency:  Patent     Post Op Vitals Reviewed: Yes    No anethesia notable event occurred.    Staff: CRNA           Last Filed PACU Vitals:  Vitals Value Taken Time   Temp     Pulse 64    /63    Resp 20    SpO2 94

## 2025-06-03 NOTE — ANESTHESIA PREPROCEDURE EVALUATION
Procedure:  EGD    Relevant Problems   CARDIO   (+) Hyperlipidemia   (+) Hypertension   (+) Migraine      ENDO   (+) Type 2 diabetes mellitus with hyperglycemia, without long-term current use of insulin (HCC)      GI/HEPATIC   (+) Esophageal reflux      /RENAL   (+) Stage 3 chronic kidney disease, unspecified whether stage 3a or 3b CKD (HCC)      MUSCULOSKELETAL   (+) Degenerative arthritis of left knee      NEURO/PSYCH   (+) Migraine   (+) Mild episode of recurrent major depressive disorder (HCC)   (+) Seizure disorder (HCC)      Rheumatology   (+) Systemic lupus erythematosus, unspecified SLE type, unspecified organ involvement status (formerly [facility])        Physical Exam    Airway     Mallampati score: II  TM Distance: <3 FB  Neck ROM: full  Upper bite lip test: I  Mouth opening: >= 4 cm      Cardiovascular  Cardiovascular exam normal    Dental   No notable dental hx     Pulmonary  Pulmonary exam normal     Neurological  - normal exam    Other Findings  post-pubertal.      Anesthesia Plan  ASA Score- 2     Anesthesia Type- IV sedation with anesthesia with ASA Monitors.         Additional Monitors:     Airway Plan:     Comment: No recent EKG .       Plan Factors-Exercise tolerance (METS): >4 METS.    Chart reviewed.   Existing labs reviewed.     Patient is not a current smoker. Patient not instructed to abstain from smoking on day of procedure. Patient did not smoke on day of surgery.            Induction-     Postoperative Plan- .   Monitoring Plan - Monitoring plan - standard ASA monitoring  Post Operative Pain Plan - non-opiod analgesics        Informed Consent- Anesthetic plan and risks discussed with patient.  I personally reviewed this patient with the CRNA. Discussed and agreed on the Anesthesia Plan with the CRNA..      NPO Status:  Vitals Value Taken Time   Date of last liquid 06/02/25 06/03/25 08:21   Time of last liquid 1000 06/03/25 08:21   Date of last solid 06/02/25 06/03/25 08:21   Time of last solid 1700  06/03/25 08:21

## 2025-06-03 NOTE — H&P
"History and Physical -  [facility]  Kimberly Kowalski 75 y.o. female MRN: [id number]                  HPI: Kimberly Kowalski is a 75 y.o. year old female who presents for hiatal hernia, GERD, dysphagia      REVIEW OF SYSTEMS: Per the HPI, and otherwise unremarkable.    Historical Information   Past Medical History[1]  Past Surgical History[2]  Social History   Social History     Substance and Sexual Activity   Alcohol Use Never     Social History     Substance and Sexual Activity   Drug Use Never     Tobacco Use History[3]  Family History[4]    Meds/Allergies     Current Medications[5]    Allergies[6]    Objective     /78   Pulse 76   Temp (!) 96.7 °F (35.9 °C) (Bladder)   Resp 16   Ht 5' 4\" (1.626 m)   Wt 84.8 kg (187 lb)   SpO2 96%   BMI 32.10 kg/m²       PHYSICAL EXAM    Gen: NAD  Head: NCAT  CV: RRR  CHEST: Clear  ABD: soft, NT/ND  EXT: no edema      ASSESSMENT/PLAN:  This is a 75 y.o. year old female here for EGD, and she is stable and optimized for her procedure.             [1]   Past Medical History:  Diagnosis Date    Cervicalgia     Depression     Diabetes mellitus (HCC)     Esophageal reflux     Hyperlipidemia     Hypertension     Essential    Migraine     Nonspecific abnormal electrocardiogram (ECG)     Sciatica     Seizure disorder (HCC)     Sjogren's syndrome (HCC)    [2]   Past Surgical History:  Procedure Laterality Date    CERVICAL POLYPECTOMY      CRANIOTOMY       Left frontal craniotomy for resection of tumor-Dr. Jose Luis Murillo [facility]     TUBAL LIGATION     [3]   Social History  Tobacco Use   Smoking Status Never    Passive exposure: Never   Smokeless Tobacco Never   [4]   Family History  Problem Relation Name Age of Onset    Aneurysm Mother      Heart disease Father      Lupus Sister yudy     Thyroid cancer Sister yudy         age unknown    No Known Problems Sister lauren     No Known Problems Sister      Diabetes Maternal Grandmother      No Known " Problems Maternal Grandfather      No Known Problems Paternal Grandmother      No Known Problems Paternal Grandfather      Heart disease Brother      Diabetes Maternal Aunt      No Known Problems Maternal Aunt      No Known Problems Maternal Aunt      No Known Problems Paternal Aunt      Breast cancer Neg Hx      BRCA2 Positive Neg Hx      BRCA1 Positive Neg Hx      Endometrial cancer Neg Hx      Colon cancer Neg Hx      Ovarian cancer Neg Hx      BRCA1 Negative Neg Hx      BRCA2 Negative Neg Hx      Breast cancer additional onset Neg Hx      BRCA 1/2 Neg Hx     [5]   Current Outpatient Medications:     amLODIPine (NORVASC) 2.5 mg tablet    ASPIRIN 81 PO    atorvastatin (LIPITOR) 20 mg tablet    calcium citrate-vitamin D 315 mg-5 mcg tablet    cholecalciferol (VITAMIN D3) 1,000 units tablet    escitalopram (LEXAPRO) 20 mg tablet    gabapentin (NEURONTIN) 300 mg capsule    levETIRAcetam (KEPPRA) 750 mg tablet    metFORMIN (GLUCOPHAGE-XR) 750 mg 24 hr tablet    Multiple Vitamins-Minerals (MULTIVITAMIN WITH MINERALS) tablet    omeprazole (PriLOSEC) 40 MG capsule    traZODone (DESYREL) 100 mg tablet    venlafaxine (EFFEXOR-XR) 75 mg 24 hr capsule    lidocaine-prilocaine (EMLA) cream    Current Facility-Administered Medications:     lactated ringers infusion, 125 mL/hr, Intravenous, Continuous    Facility-Administered Medications Ordered in Other Encounters:     lactated ringers infusion, , Intravenous, Continuous PRN, New Bag at 06/03/25 0823  [6]   Allergies  Allergen Reactions    Lamotrigine      Other reaction(s): LAMOTRIGINE (LAMICTAL) rash

## 2025-06-06 PROCEDURE — 88341 IMHCHEM/IMCYTCHM EA ADD ANTB: CPT | Performed by: PATHOLOGY

## 2025-06-06 PROCEDURE — 88342 IMHCHEM/IMCYTCHM 1ST ANTB: CPT | Performed by: PATHOLOGY

## 2025-06-06 PROCEDURE — 88305 TISSUE EXAM BY PATHOLOGIST: CPT | Performed by: PATHOLOGY

## 2025-06-09 ENCOUNTER — RESULTS FOLLOW-UP (OUTPATIENT)
Dept: GASTROENTEROLOGY | Facility: MEDICAL CENTER | Age: 75
End: 2025-06-09

## 2025-07-31 ENCOUNTER — TELEPHONE (OUTPATIENT)
Age: 75
End: 2025-07-31

## 2025-07-31 DIAGNOSIS — Z12.31 ENCOUNTER FOR SCREENING MAMMOGRAM FOR BREAST CANCER: Primary | ICD-10-CM

## 2025-08-01 ENCOUNTER — TELEPHONE (OUTPATIENT)
Dept: GASTROENTEROLOGY | Facility: MEDICAL CENTER | Age: 75
End: 2025-08-01

## 2025-08-12 ENCOUNTER — OFFICE VISIT (OUTPATIENT)
Dept: OBGYN CLINIC | Facility: CLINIC | Age: 75
End: 2025-08-12
Payer: MEDICARE